# Patient Record
Sex: FEMALE | Race: WHITE | Employment: UNEMPLOYED | URBAN - NONMETROPOLITAN AREA
[De-identification: names, ages, dates, MRNs, and addresses within clinical notes are randomized per-mention and may not be internally consistent; named-entity substitution may affect disease eponyms.]

---

## 2017-01-19 ENCOUNTER — TELEPHONE (OUTPATIENT)
Dept: NEUROLOGY | Age: 52
End: 2017-01-19

## 2017-02-09 ENCOUNTER — OFFICE VISIT (OUTPATIENT)
Dept: NEUROLOGY | Age: 52
End: 2017-02-09
Payer: COMMERCIAL

## 2017-02-09 VITALS
BODY MASS INDEX: 25.74 KG/M2 | WEIGHT: 164 LBS | DIASTOLIC BLOOD PRESSURE: 82 MMHG | OXYGEN SATURATION: 100 % | HEIGHT: 67 IN | HEART RATE: 111 BPM | SYSTOLIC BLOOD PRESSURE: 124 MMHG

## 2017-02-09 DIAGNOSIS — G62.9 NEUROPATHY: ICD-10-CM

## 2017-02-09 DIAGNOSIS — R53.1 WEAKNESS: ICD-10-CM

## 2017-02-09 DIAGNOSIS — M79.662 PAIN IN BOTH LOWER LEGS: ICD-10-CM

## 2017-02-09 DIAGNOSIS — R20.2 PARESTHESIA: Primary | ICD-10-CM

## 2017-02-09 DIAGNOSIS — M79.661 PAIN IN BOTH LOWER LEGS: ICD-10-CM

## 2017-02-09 PROCEDURE — 99214 OFFICE O/P EST MOD 30 MIN: CPT | Performed by: PSYCHIATRY & NEUROLOGY

## 2017-02-09 RX ORDER — VITAMIN B COMPLEX
1 CAPSULE ORAL DAILY
COMMUNITY
End: 2021-10-21 | Stop reason: CLARIF

## 2017-02-09 RX ORDER — FERROUS SULFATE 325(65) MG
325 TABLET ORAL
COMMUNITY
End: 2017-08-09

## 2017-02-09 RX ORDER — METHOCARBAMOL 500 MG/1
500 TABLET, FILM COATED ORAL 3 TIMES DAILY
Qty: 90 TABLET | Refills: 5 | Status: SHIPPED | OUTPATIENT
Start: 2017-02-09 | End: 2021-03-31 | Stop reason: CLARIF

## 2017-02-09 RX ORDER — METHOCARBAMOL 500 MG/1
500 TABLET, FILM COATED ORAL
COMMUNITY
End: 2017-02-09 | Stop reason: SDUPTHER

## 2017-02-09 RX ORDER — CARISOPRODOL 350 MG/1
350 TABLET ORAL 4 TIMES DAILY PRN
COMMUNITY
End: 2017-08-09

## 2017-02-09 RX ORDER — PREGABALIN 75 MG/1
CAPSULE ORAL
Qty: 120 CAPSULE | Refills: 5 | Status: SHIPPED | OUTPATIENT
Start: 2017-02-09 | End: 2017-04-28 | Stop reason: SDUPTHER

## 2017-02-27 ENCOUNTER — TELEPHONE (OUTPATIENT)
Dept: NEUROLOGY | Age: 52
End: 2017-02-27

## 2017-03-08 ENCOUNTER — TELEPHONE (OUTPATIENT)
Dept: NEUROLOGY | Age: 52
End: 2017-03-08

## 2017-04-24 ENCOUNTER — TELEPHONE (OUTPATIENT)
Dept: NEUROLOGY | Age: 52
End: 2017-04-24

## 2017-05-01 RX ORDER — PREGABALIN 75 MG/1
CAPSULE ORAL
Qty: 360 CAPSULE | Refills: 3 | Status: SHIPPED | OUTPATIENT
Start: 2017-05-01 | End: 2017-08-10 | Stop reason: SDUPTHER

## 2017-05-31 ENCOUNTER — TELEPHONE (OUTPATIENT)
Dept: NEUROLOGY | Age: 52
End: 2017-05-31

## 2017-08-09 ENCOUNTER — OFFICE VISIT (OUTPATIENT)
Dept: NEUROLOGY | Age: 52
End: 2017-08-09
Payer: COMMERCIAL

## 2017-08-09 VITALS
HEART RATE: 93 BPM | SYSTOLIC BLOOD PRESSURE: 136 MMHG | HEIGHT: 66 IN | OXYGEN SATURATION: 98 % | BODY MASS INDEX: 26.84 KG/M2 | DIASTOLIC BLOOD PRESSURE: 85 MMHG | WEIGHT: 167 LBS

## 2017-08-09 DIAGNOSIS — M79.662 PAIN IN BOTH LOWER LEGS: ICD-10-CM

## 2017-08-09 DIAGNOSIS — R53.1 WEAKNESS: ICD-10-CM

## 2017-08-09 DIAGNOSIS — R20.2 PARESTHESIA: Primary | ICD-10-CM

## 2017-08-09 DIAGNOSIS — G62.9 NEUROPATHY: ICD-10-CM

## 2017-08-09 DIAGNOSIS — M79.661 PAIN IN BOTH LOWER LEGS: ICD-10-CM

## 2017-08-09 PROCEDURE — 99214 OFFICE O/P EST MOD 30 MIN: CPT | Performed by: PSYCHIATRY & NEUROLOGY

## 2017-08-09 RX ORDER — FAMOTIDINE 20 MG/1
TABLET, FILM COATED ORAL
Refills: 5 | COMMUNITY
Start: 2017-07-22 | End: 2018-09-05 | Stop reason: CLARIF

## 2017-08-14 RX ORDER — PREGABALIN 75 MG/1
CAPSULE ORAL
Qty: 360 CAPSULE | Refills: 3 | Status: SHIPPED | OUTPATIENT
Start: 2017-08-14 | End: 2018-11-06 | Stop reason: ALTCHOICE

## 2017-08-15 ENCOUNTER — TELEPHONE (OUTPATIENT)
Dept: NEUROLOGY | Age: 52
End: 2017-08-15

## 2017-09-05 ENCOUNTER — TELEPHONE (OUTPATIENT)
Dept: NEUROLOGY | Age: 52
End: 2017-09-05

## 2017-09-22 PROBLEM — K59.01 SLOW TRANSIT CONSTIPATION: Status: ACTIVE | Noted: 2017-09-22

## 2017-09-22 PROBLEM — M79.7 FIBROMYALGIA: Status: ACTIVE | Noted: 2017-09-22

## 2017-09-22 PROBLEM — E53.8 VITAMIN B12 DEFICIENCY: Status: ACTIVE | Noted: 2017-09-22

## 2017-09-22 PROBLEM — Z12.4 PAP SMEAR FOR CERVICAL CANCER SCREENING: Status: ACTIVE | Noted: 2017-09-22

## 2017-09-22 PROBLEM — E55.9 VITAMIN D DEFICIENCY: Status: ACTIVE | Noted: 2017-09-22

## 2017-09-25 DIAGNOSIS — E55.9 VITAMIN D DEFICIENCY: ICD-10-CM

## 2017-09-25 DIAGNOSIS — E53.8 VITAMIN B12 DEFICIENCY: ICD-10-CM

## 2017-09-25 DIAGNOSIS — Z00.00 ANNUAL PHYSICAL EXAM: Primary | ICD-10-CM

## 2017-10-03 RX ORDER — NORETHINDRONE ACETATE AND ETHINYL ESTRADIOL AND FERROUS FUMARATE 1MG-20(21)
KIT ORAL
Qty: 28 TABLET | Refills: 9 | Status: SHIPPED | OUTPATIENT
Start: 2017-10-03 | End: 2017-10-04

## 2017-10-04 ENCOUNTER — OFFICE VISIT (OUTPATIENT)
Dept: INTERNAL MEDICINE | Age: 52
End: 2017-10-04
Payer: COMMERCIAL

## 2017-10-04 VITALS
RESPIRATION RATE: 18 BRPM | HEART RATE: 87 BPM | BODY MASS INDEX: 25.9 KG/M2 | SYSTOLIC BLOOD PRESSURE: 112 MMHG | HEIGHT: 67 IN | DIASTOLIC BLOOD PRESSURE: 72 MMHG | WEIGHT: 165 LBS | OXYGEN SATURATION: 99 %

## 2017-10-04 DIAGNOSIS — Z12.12 SCREENING FOR COLORECTAL CANCER: ICD-10-CM

## 2017-10-04 DIAGNOSIS — M79.7 FIBROMYALGIA: ICD-10-CM

## 2017-10-04 DIAGNOSIS — Z00.00 ANNUAL PHYSICAL EXAM: Primary | ICD-10-CM

## 2017-10-04 DIAGNOSIS — E55.9 VITAMIN D DEFICIENCY: ICD-10-CM

## 2017-10-04 DIAGNOSIS — Z00.00 ANNUAL PHYSICAL EXAM: ICD-10-CM

## 2017-10-04 DIAGNOSIS — Z12.11 SCREENING FOR COLORECTAL CANCER: ICD-10-CM

## 2017-10-04 DIAGNOSIS — Z23 IMMUNIZATION DUE: ICD-10-CM

## 2017-10-04 DIAGNOSIS — G60.9 IDIOPATHIC PERIPHERAL NEUROPATHY: ICD-10-CM

## 2017-10-04 DIAGNOSIS — Z12.31 SCREENING MAMMOGRAM, ENCOUNTER FOR: ICD-10-CM

## 2017-10-04 DIAGNOSIS — E53.8 VITAMIN B12 DEFICIENCY: ICD-10-CM

## 2017-10-04 PROBLEM — K90.0 CELIAC DISEASE: Status: ACTIVE | Noted: 2017-10-04

## 2017-10-04 LAB
ALBUMIN SERPL-MCNC: 3.8 G/DL (ref 3.5–5.2)
ALP BLD-CCNC: 73 U/L (ref 35–104)
ALT SERPL-CCNC: 28 U/L (ref 5–33)
ANION GAP SERPL CALCULATED.3IONS-SCNC: 12 MMOL/L (ref 7–19)
AST SERPL-CCNC: 27 U/L (ref 5–32)
BILIRUB SERPL-MCNC: 0.6 MG/DL (ref 0.2–1.2)
BILIRUBIN URINE: NEGATIVE
BLOOD, URINE: NEGATIVE
BUN BLDV-MCNC: 9 MG/DL (ref 6–20)
CALCIUM SERPL-MCNC: 9 MG/DL (ref 8.6–10)
CHLORIDE BLD-SCNC: 102 MMOL/L (ref 98–111)
CHOLESTEROL, TOTAL: 166 MG/DL (ref 160–199)
CLARITY: CLEAR
CO2: 28 MMOL/L (ref 22–29)
COLOR: NORMAL
CREAT SERPL-MCNC: 0.8 MG/DL (ref 0.5–0.9)
GFR NON-AFRICAN AMERICAN: >60
GLUCOSE BLD-MCNC: 85 MG/DL (ref 74–109)
GLUCOSE URINE: NEGATIVE MG/DL
HCT VFR BLD CALC: 46 % (ref 37–47)
HDLC SERPL-MCNC: 52 MG/DL (ref 65–121)
HEMOGLOBIN: 14.5 G/DL (ref 12–16)
KETONES, URINE: NEGATIVE MG/DL
LDL CHOLESTEROL CALCULATED: 99 MG/DL
LEUKOCYTE ESTERASE, URINE: NEGATIVE
MCH RBC QN AUTO: 28.7 PG (ref 27–31)
MCHC RBC AUTO-ENTMCNC: 31.5 G/DL (ref 33–37)
MCV RBC AUTO: 91.1 FL (ref 81–99)
NITRITE, URINE: NEGATIVE
PDW BLD-RTO: 14 % (ref 11.5–14.5)
PH UA: 6.5
PLATELET # BLD: 222 K/UL (ref 130–400)
PMV BLD AUTO: 10.6 FL (ref 9.4–12.3)
POTASSIUM SERPL-SCNC: 3.9 MMOL/L (ref 3.5–5)
PROTEIN UA: NEGATIVE MG/DL
RBC # BLD: 5.05 M/UL (ref 4.2–5.4)
SODIUM BLD-SCNC: 142 MMOL/L (ref 136–145)
SPECIFIC GRAVITY UA: 1.02
TOTAL PROTEIN: 7.2 G/DL (ref 6.6–8.7)
TRIGL SERPL-MCNC: 73 MG/DL (ref 149–199)
TSH SERPL DL<=0.05 MIU/L-ACNC: 1.57 UIU/ML (ref 0.27–4.2)
UROBILINOGEN, URINE: 1 E.U./DL
VITAMIN B-12: 883 PG/ML (ref 211–946)
VITAMIN D 25-HYDROXY: 53.5 NG/ML
WBC # BLD: 5.7 K/UL (ref 4.8–10.8)

## 2017-10-04 PROCEDURE — 90715 TDAP VACCINE 7 YRS/> IM: CPT | Performed by: INTERNAL MEDICINE

## 2017-10-04 PROCEDURE — 90471 IMMUNIZATION ADMIN: CPT | Performed by: INTERNAL MEDICINE

## 2017-10-04 PROCEDURE — 99396 PREV VISIT EST AGE 40-64: CPT | Performed by: INTERNAL MEDICINE

## 2017-10-04 ASSESSMENT — ENCOUNTER SYMPTOMS
ABDOMINAL PAIN: 0
BLOOD IN STOOL: 0
TROUBLE SWALLOWING: 0
VOICE CHANGE: 0
VOMITING: 0
COUGH: 0
EYE REDNESS: 0
DIARRHEA: 0
NAUSEA: 0
CHEST TIGHTNESS: 0
CONSTIPATION: 0
COLOR CHANGE: 0
EYE PAIN: 0
SINUS PRESSURE: 0
SORE THROAT: 0
WHEEZING: 0

## 2017-10-04 NOTE — PROGRESS NOTES
Chief Complaint:   Lupe Toribio is a 54 y.o. female who presents for complete physical exam.    History of Present Illness:      Lupe Toribio is a 54 y.o. female who presents today for wellness visit AND follow up on her chronic medical conditions as noted below. Annual physical exam    Vitamin B12 deficiency- taking b12 supplement    Vitamin D deficiency- vit d takes with mvi    Idiopathic peripheral neuropathy- pt has followed with dr Cindy Smith she recently has read that BCP could cuase it?  So she has now stopped it    Fibromyalgia- continues to have pain sx      Patient Active Problem List    Diagnosis Date Noted    Annual physical exam 10/04/2017    Screening mammogram, encounter for 10/04/2017    Screening for colorectal cancer 10/04/2017     1/15 neg repeat 5 yrs ( had polyps)      Vitamin D deficiency 09/22/2017    Vitamin B12 deficiency 09/22/2017    Pap smear for cervical cancer screening 09/22/2017     10/16      Fibromyalgia 09/22/2017    Slow transit constipation 09/22/2017    Paresthesia 09/14/2016    Weakness 09/14/2016    Pain in both lower legs 09/14/2016    Idiopathic peripheral neuropathy 09/14/2016       Past Medical History:   Diagnosis Date    Arthralgia     Bradford's esophagus     Celiac disease     Depression     Fibromyalgia     Neuropathy (HCC)     Rash     Rhinitis     Trochanteric tendinitis        Past Surgical History:   Procedure Laterality Date    CHOLECYSTECTOMY      COLONOSCOPY      GALLBLADDER SURGERY      NASAL ENDOSCOPY      TONSILLECTOMY         Current Outpatient Prescriptions   Medication Sig Dispense Refill    pregabalin (LYRICA) 75 MG capsule 2 caps bid 360 capsule 3    famotidine (PEPCID) 20 MG tablet TAKE 1 TABLET BY MOUTH TWICE A DAY  5    b complex vitamins capsule Take 1 capsule by mouth daily      Multiple Vitamin (MULTI VITAMIN DAILY PO) Take by mouth      methocarbamol (ROBAXIN) 500 MG tablet Take 1 tablet by mouth 3 times daily 90 85     BUN 10/04/2017 9     CREATININE 10/04/2017 0.8     GFR Non- 10/04/2017 >60     Calcium 10/04/2017 9.0     Total Protein 10/04/2017 7.2     Alb 10/04/2017 3.8     Total Bilirubin 10/04/2017 0.6     Alkaline Phosphatase 10/04/2017 73     ALT 10/04/2017 28     AST 10/04/2017 27     Cholesterol, Total 10/04/2017 166     Triglycerides 10/04/2017 73*    HDL 10/04/2017 52*    LDL Calculated 10/04/2017 99     Color, UA 10/04/2017 DK YELLOW     Clarity, UA 10/04/2017 Clear     Glucose, Ur 10/04/2017 Negative     Bilirubin Urine 10/04/2017 Negative     Ketones, Urine 10/04/2017 Negative     Specific Gravity, UA 10/04/2017 1.021     Blood, Urine 10/04/2017 Negative     pH, UA 10/04/2017 6.5     Protein, UA 10/04/2017 Negative     Urobilinogen, Urine 10/04/2017 1.0     Nitrite, Urine 10/04/2017 Negative     Leukocyte Esterase, Urine 10/04/2017 Negative     Vit D, 25-Hydroxy 10/04/2017 53.5     Vitamin B-12 10/04/2017 883          Review of Systems   Constitutional: Negative for chills, fatigue and fever. HENT: Negative for congestion, ear pain, postnasal drip, sinus pressure, sore throat, trouble swallowing and voice change. Eyes: Negative for pain, redness and visual disturbance. Respiratory: Negative for cough, chest tightness and wheezing. Cardiovascular: Negative for chest pain, palpitations and leg swelling. Gastrointestinal: Negative for abdominal pain, blood in stool, constipation, diarrhea, nausea and vomiting. Endocrine: Negative for polydipsia and polyuria. Genitourinary: Negative for dysuria, enuresis, flank pain, frequency and urgency. Musculoskeletal: Negative for arthralgias, gait problem and joint swelling. Skin: Negative for color change and rash. Neurological: Negative for dizziness, tremors, syncope, facial asymmetry, speech difficulty, weakness, numbness and headaches.    Psychiatric/Behavioral: Negative for agitation, behavioral mammogram, encounter for- schedule  * pap 2016 repeat 3 yrs  * cscope 2015 repeat 5 yrs    Vitamin B12 deficiency- her level is 883    Vitamin D deficiency- vit d level is good- take 800 with her MVI    Idiopathic peripheral neuropathy- Complex symptoms that involve paresthesias, episodes or weakness, pain in lower legs-patient follows with Dr. Wei Yanez- she will get recommendations          Orders Placed This Encounter   Procedures    LUIS A DIGITAL SCREEN W CAD BILATERAL    CBC Auto Differential    Comprehensive Metabolic Panel    Lipid Panel    Vitamin D 25 Hydroxy    Vitamin B12    TSH without Reflex    Urinalysis     New Prescriptions    No medications on file      There are no Patient Instructions on file for this visit. Return in about 1 year (around 10/4/2018) for Annual Physical.   EMR Dragon/transcription disclaimer:Significant part of this  encounter note is electronic transcription/translation of spoken language to printed text. The electronic translation of spoken language may be erroneous, or at times, nonsensical words or phrases may be inadvertently transcribed.  Although I have reviewed the note for such errors, some may still exist.

## 2017-10-04 NOTE — MR AVS SNAPSHOT
After Visit Summary             Jeffrey Aguirre   10/4/2017 1:30 PM   Office Visit    Description:  Female : 1965   Provider:  Nancy Bolivar MD   Department:  [address] [place] [address] Internal Medicine              Your Follow-Up and Future Appointments         Below is a list of your follow-up and future appointments. This may not be a complete list as you may have made appointments directly with providers that we are not aware of or your providers may have made some for you. Please call your providers to confirm appointments. It is important to keep your appointments. Please bring your current insurance card, photo ID, co-pay, and all medication bottles to your appointment. If self-pay, payment is expected at the time of service. Your To-Do List     Future Appointments Provider Department Dept Phone    2017 3:30 PM [facility] [address] in [address] on the fourth floor of the [address] [place] [address] at the scheduled time      *Patient should not wear deodorant or powder. 2018 11:00 AM Francisca Stubbs MD [facility] [phone]    Please arrive 15 minutes prior to appointment, bring photo ID and insurance card. 10/17/2018 1:30 PM Nancy Bolivar MD [address] [place] [address] Internal Medicine [phone]    If this is a sports or school physical please bring the physical form with you.     Future Orders Complete By Expires    LUIS A DIGITAL SCREEN W CAD BILATERAL [ Custom]  10/4/2017 2018    CBC Auto Differential [OYE2008 Custom]  10/4/2018 2018    Comprehensive Metabolic Panel [LDW26 Custom]  10/4/2018 2018    Lipid Panel [LAB18 Custom]  10/4/2018 2018    TSH without Reflex [OCI040 Custom]  10/4/2018 2018    Urinalysis [FUJ449 Custom]  10/4/2018 2018    Vitamin B12 [LAB67 Custom]  10/4/2018 2018    Vitamin D 25 Hydroxy [TWA039 Custom]  10/4/2018 2018    Follow-Up methocarbamol (ROBAXIN) 500 MG tablet Take 1 tablet by mouth 3 times daily    gabapentin (NEURONTIN) 300 MG capsule TAKE ONE CAPSULE BY MOUTH TWICE A DAY    RESTASIS 0.05 % ophthalmic emulsion INSTILL 1 DROP INTO BOTH EYES TWICE DAILY FOR 30 DAYS    [employer] ALLERGY RELIEF D  MG per extended release tablet TAKE 1 TABLET BY MOUTH EVERY 12 HOURS AS NEEDED    sucralfate (CARAFATE) 1 GM tablet DISSOLVE 1 TABLET INTO SLURRY THREE TIMES DAILY BEFORE MEALS    triamcinolone (NASACORT ALLERGY 24HR) 55 MCG/ACT nasal inhaler 2 sprays by Nasal route daily      Allergies              Augmentin [Amoxicillin-pot Clavulanate]     Bactrim [Sulfamethoxazole-trimethoprim]     Factive [Gemifloxacin]     Gluten Meal     Glutethimides     Proton Pump Inhibitors       We Ordered/Performed the following           Tdap (age 10y-63y) IM (ADACEL)          Additional Information        Basic Information     Date Of Birth Sex Race Ethnicity Preferred Language    1965 Female White Non-/Non  English      Problem List as of 10/4/2017                 Annual physical exam    Screening mammogram, encounter for    Screening for colorectal cancer    Celiac disease    Vitamin D deficiency    Vitamin B12 deficiency    Pap smear for cervical cancer screening    Fibromyalgia    Slow transit constipation    Paresthesia    Weakness    Pain in both lower legs    Idiopathic peripheral neuropathy      Immunizations as of 10/4/2017     Name Date    Tdap (Boostrix, Adacel) 10/4/2017      Preventive Care        Date Due    Yearly Flu Vaccine (1) 10/13/2027 (Originally 9/1/2017)    Pap Smear 10/10/2019    Cholesterol Screening 10/4/2022    Tetanus Combination Vaccine (2 - Td) 10/4/2027            MyChart Signup           Our records indicate that you have an active [facility] account. You can view your After Visit Summary by going to [email or web address] and logging in with your [facility] username and password. If you don't have a [facility] username and password but a parent or guardian has access to your record, the parent or guardian should login with their own Sprinkle username and password and access your record to view the After Visit Summary. Additional Information  If you have questions, please contact the physician practice where you receive care. Remember, Sprinkle is NOT to be used for urgent needs. For medical emergencies, dial 911. For questions regarding your [facility]inkle account call [phone]. If you have a clinical question, please call your doctor's office.

## 2017-12-01 ENCOUNTER — TELEPHONE (OUTPATIENT)
Dept: NEUROLOGY | Age: 52
End: 2017-12-01

## 2017-12-01 NOTE — TELEPHONE ENCOUNTER
Spoke to patients home health nurse about patients INR level. She stated that primary care was supposed to take care of this but the office stated she needs to have a new patient appointment and not just a hospital follow up. I spoke with Dr. Bryan Montes concerning this and he wants her to stay at the same 1.5mg and recheck the INR on Monday. I told her to give us a call if she has any questions.

## 2017-12-13 ENCOUNTER — HOSPITAL ENCOUNTER (OUTPATIENT)
Dept: WOMENS IMAGING | Age: 52
Discharge: HOME | End: 2017-12-13
Payer: COMMERCIAL

## 2017-12-13 DIAGNOSIS — Z12.31 SCREENING MAMMOGRAM, ENCOUNTER FOR: ICD-10-CM

## 2017-12-13 PROCEDURE — 77063 BREAST TOMOSYNTHESIS BI: CPT

## 2017-12-13 PROCEDURE — G0279 TOMOSYNTHESIS, MAMMO: HCPCS

## 2018-01-02 ENCOUNTER — TELEPHONE (OUTPATIENT)
Dept: NEUROSURGERY | Age: 53
End: 2018-01-02

## 2018-01-03 DIAGNOSIS — M79.661 PAIN IN BOTH LOWER LEGS: ICD-10-CM

## 2018-01-03 DIAGNOSIS — R53.1 WEAKNESS: ICD-10-CM

## 2018-01-03 DIAGNOSIS — G60.9 IDIOPATHIC PERIPHERAL NEUROPATHY: ICD-10-CM

## 2018-01-03 DIAGNOSIS — Z78.0 MENOPAUSE: Primary | ICD-10-CM

## 2018-01-03 DIAGNOSIS — M79.662 PAIN IN BOTH LOWER LEGS: ICD-10-CM

## 2018-01-11 DIAGNOSIS — Z78.0 POST-MENOPAUSAL: Primary | ICD-10-CM

## 2018-01-11 RX ORDER — GABAPENTIN 300 MG/1
CAPSULE ORAL
Qty: 360 CAPSULE | Refills: 3 | Status: SHIPPED | OUTPATIENT
Start: 2018-01-11 | End: 2018-04-02 | Stop reason: SDUPTHER

## 2018-01-18 ENCOUNTER — TELEPHONE (OUTPATIENT)
Dept: NEUROLOGY | Age: 53
End: 2018-01-18

## 2018-01-18 NOTE — TELEPHONE ENCOUNTER
Prescription Question     From  Lilia Seals To Sent  1/18/2018  3:58 PM   I would like to try a CBD oil with no THC but I read that it can inhibit hepatic drug metabolism. Is it safe to use with gabapentin and lyrica? Thank you.

## 2018-01-19 ENCOUNTER — TELEPHONE (OUTPATIENT)
Dept: INTERNAL MEDICINE | Age: 53
End: 2018-01-19

## 2018-01-22 ENCOUNTER — TELEPHONE (OUTPATIENT)
Dept: INTERNAL MEDICINE | Age: 53
End: 2018-01-22

## 2018-01-23 DIAGNOSIS — Z78.0 MENOPAUSE: ICD-10-CM

## 2018-01-23 DIAGNOSIS — Z78.0 POST-MENOPAUSAL: ICD-10-CM

## 2018-01-23 LAB
FOLLICLE STIMULATING HORMONE: 103.5 MIU/ML
PROGESTERONE LEVEL: <0.05 NG/ML

## 2018-01-27 LAB
ESTRADIOL LEVEL: 5.8 PG/ML
ESTROGEN TOTAL: 18 PG/ML
ESTRONE: 12.2 PG/ML

## 2018-02-16 ENCOUNTER — OFFICE VISIT (OUTPATIENT)
Dept: NEUROLOGY | Age: 53
End: 2018-02-16
Payer: COMMERCIAL

## 2018-02-16 VITALS
HEART RATE: 98 BPM | WEIGHT: 165 LBS | SYSTOLIC BLOOD PRESSURE: 136 MMHG | BODY MASS INDEX: 26.23 KG/M2 | DIASTOLIC BLOOD PRESSURE: 94 MMHG

## 2018-02-16 DIAGNOSIS — R53.1 WEAKNESS: ICD-10-CM

## 2018-02-16 DIAGNOSIS — M79.662 PAIN IN BOTH LOWER LEGS: ICD-10-CM

## 2018-02-16 DIAGNOSIS — M79.661 PAIN IN BOTH LOWER LEGS: ICD-10-CM

## 2018-02-16 DIAGNOSIS — G62.9 NEUROPATHY: ICD-10-CM

## 2018-02-16 DIAGNOSIS — R20.2 PARESTHESIA: Primary | ICD-10-CM

## 2018-02-16 PROCEDURE — 99214 OFFICE O/P EST MOD 30 MIN: CPT | Performed by: PSYCHIATRY & NEUROLOGY

## 2018-02-16 RX ORDER — GLUCOSAMINE HCL 500 MG
2000 TABLET ORAL
COMMUNITY
End: 2019-07-30

## 2018-02-16 RX ORDER — PREGABALIN 75 MG/1
75 CAPSULE ORAL 2 TIMES DAILY
COMMUNITY
End: 2018-09-05 | Stop reason: CLARIF

## 2018-02-16 RX ORDER — LACTULOSE 10 G/15ML
20 SOLUTION ORAL DAILY
COMMUNITY
End: 2018-11-06

## 2018-02-16 RX ORDER — PERPHENAZINE 2 MG
TABLET ORAL
COMMUNITY

## 2018-02-16 NOTE — PROGRESS NOTES
Not on file. Social History Main Topics    Smoking status: Never Smoker    Smokeless tobacco: Never Used    Alcohol use No    Drug use: Unknown    Sexual activity: Not on file     Other Topics Concern    Not on file     Social History Narrative    No narrative on file       Review of Systems     Constitutional  yes fever or chills. yes diaphoresis or significant fatigue. HENT  No tinnitus or significant hearing loss. Eyes  yes sudden vision change or eye pain  Respiratory  yes significant shortness of breath or cough  Cardiovascular  no chest pain No palpitations or significant leg swelling  Gastrointestinal  no abdominal swelling or pain. Genitourinary  No difficulty urinating, dysuria  Musculoskeletal  yes back pain or myalgia. Skin  no color change or rash  Neurologic  No seizures. No lateralizing weakness. Hematologic  no easy bruising or excessive bleeding. Psychiatric  no severe anxiety or nervousness. All other review of systems are negative. Current Outpatient Prescriptions   Medication Sig Dispense Refill    lactulose (CHRONULAC) 10 GM/15ML solution Take 20 g by mouth 3 times daily      Probiotic Product (PROBIOTIC-10 PO) Take by mouth      Omega 3-6-9 CAPS Take by mouth      Cholecalciferol (VITAMIN D3) 3000 units TABS Take 2,000 Units by mouth      pregabalin (LYRICA) 75 MG capsule Take 75 mg by mouth 2 times daily.  gabapentin (NEURONTIN) 300 MG capsule Take 2 caps by mouth twice daily.  360 capsule 3    pregabalin (LYRICA) 75 MG capsule 2 caps bid 360 capsule 3    b complex vitamins capsule Take 1 capsule by mouth daily      Multiple Vitamin (MULTI VITAMIN DAILY PO) Take by mouth      methocarbamol (ROBAXIN) 500 MG tablet Take 1 tablet by mouth 3 times daily 90 tablet 5    RESTASIS 0.05 % ophthalmic emulsion INSTILL 1 DROP INTO BOTH EYES TWICE DAILY FOR 30 DAYS  5    [employer] ALLERGY RELIEF D  MG per extended release tablet TAKE 1 TABLET BY MOUTH CREATININE 0.8 10/04/2017    GLUCOSE 85 10/04/2017    CALCIUM 9.0 10/04/2017    PROT 7.2 10/04/2017    LABALBU 3.8 10/04/2017    BILITOT 0.6 10/04/2017    ALKPHOS 73 10/04/2017    AST 27 10/04/2017    ALT 28 10/04/2017    LABGLOM >60 10/04/2017           Assessment    ICD-10-CM ICD-9-CM    1. Paresthesia R20.2 782.0    2. Weakness R53.1 780.79    3. Pain in both lower legs M79.661 729.5     M79.662     4. Neuropathy ([facility]) G62.9 355.9        Her neurological examination today was significant for some decreased sensation in the face along with arms and legs subjectively. Her motor exam revealed give way weakness in the right arm and right leg. Her reflexes were relatively symmetric. Her gait was unremarkable. Based upon her history and examination, it is unclear what the etiology of her symptoms are. She has symptoms of generalized numbness, worse in the face along with some focal right-sided weakness. Her MRI of the brain had no abnormal signal within it to suggest either an ischemic etiology or a demyelinating one. Her MRI of the cervical spine revealed some degenerative changes but not abnormal signal within the cord. Her blood work including B12, methylmalonic acid, TSH, T4, a RUSTAM, double-stranded DNA, sed rate, RPR, and CPK was unremarkable except for an elevated RUSTAM titer of :320. Her EMG with Nerve conduction study of the left arm and legs was essentially unremarkable. For her symptoms of itching her gralise will be weaned off due cost for her small fiber neuropathy. She was tried on Lyrica but not approved. The patient indicated understanding of the management plan. Her LP with MS panel for completeness was unremarkable. Did add requip but made her feel worse. She  Is  on Lyrica 75 mg bid and neurontin 600 mg bid. Can not afford Gralise. We could consider valium or marinol. CBD oil made her worse. She's to follow-up with me in approximately 6 months and call with any further problems.       Plan    No orders

## 2018-02-23 ENCOUNTER — TELEPHONE (OUTPATIENT)
Dept: NEUROLOGY | Age: 53
End: 2018-02-23

## 2018-03-29 ENCOUNTER — TELEPHONE (OUTPATIENT)
Dept: NEUROLOGY | Age: 53
End: 2018-03-29

## 2018-04-02 ENCOUNTER — OFFICE VISIT (OUTPATIENT)
Dept: NEUROLOGY | Age: 53
End: 2018-04-02
Payer: COMMERCIAL

## 2018-04-02 VITALS
HEART RATE: 90 BPM | BODY MASS INDEX: 26.52 KG/M2 | HEIGHT: 66 IN | OXYGEN SATURATION: 98 % | SYSTOLIC BLOOD PRESSURE: 134 MMHG | DIASTOLIC BLOOD PRESSURE: 86 MMHG | WEIGHT: 165 LBS

## 2018-04-02 DIAGNOSIS — M79.661 PAIN IN BOTH LOWER LEGS: ICD-10-CM

## 2018-04-02 DIAGNOSIS — M79.662 PAIN IN BOTH LOWER LEGS: ICD-10-CM

## 2018-04-02 DIAGNOSIS — G62.9 NEUROPATHY: ICD-10-CM

## 2018-04-02 DIAGNOSIS — R53.1 WEAKNESS: ICD-10-CM

## 2018-04-02 DIAGNOSIS — R20.2 PARESTHESIA: Primary | ICD-10-CM

## 2018-04-02 DIAGNOSIS — R20.2 PARESTHESIA: ICD-10-CM

## 2018-04-02 PROCEDURE — 99214 OFFICE O/P EST MOD 30 MIN: CPT | Performed by: PSYCHIATRY & NEUROLOGY

## 2018-04-02 RX ORDER — GABAPENTIN 300 MG/1
CAPSULE ORAL
Qty: 540 CAPSULE | Refills: 4 | Status: SHIPPED | OUTPATIENT
Start: 2018-04-02 | End: 2021-10-21

## 2018-04-02 RX ORDER — DEXAMETHASONE 0.5 MG/5ML
SOLUTION ORAL
Refills: 0 | COMMUNITY
Start: 2018-03-12 | End: 2018-11-06 | Stop reason: ALTCHOICE

## 2018-04-05 LAB
ENA TO RNP ANTIBODY: 0
ENA TO SMITH (SM) ANTIBODY: 0
ENA TO SSB (LA) ANTIBODY: 0
SSA 52 (RO) (ENA) AB, IGG: 19

## 2018-04-10 ENCOUNTER — TELEPHONE (OUTPATIENT)
Dept: NEUROSURGERY | Age: 53
End: 2018-04-10

## 2018-04-12 PROBLEM — Z12.4 PAP SMEAR FOR CERVICAL CANCER SCREENING: Status: RESOLVED | Noted: 2017-09-22 | Resolved: 2018-04-12

## 2018-04-12 PROBLEM — Z12.31 SCREENING MAMMOGRAM, ENCOUNTER FOR: Status: RESOLVED | Noted: 2017-10-04 | Resolved: 2018-04-12

## 2018-04-12 PROBLEM — Z12.11 SCREENING FOR COLORECTAL CANCER: Status: RESOLVED | Noted: 2017-10-04 | Resolved: 2018-04-12

## 2018-04-12 PROBLEM — Z12.12 SCREENING FOR COLORECTAL CANCER: Status: RESOLVED | Noted: 2017-10-04 | Resolved: 2018-04-12

## 2018-04-12 PROBLEM — Z00.00 ANNUAL PHYSICAL EXAM: Status: RESOLVED | Noted: 2017-10-04 | Resolved: 2018-04-12

## 2018-04-24 ENCOUNTER — TELEPHONE (OUTPATIENT)
Dept: NEUROLOGY | Age: 53
End: 2018-04-24

## 2018-09-05 ENCOUNTER — OFFICE VISIT (OUTPATIENT)
Dept: INTERNAL MEDICINE | Age: 53
End: 2018-09-05
Payer: COMMERCIAL

## 2018-09-05 VITALS
WEIGHT: 159 LBS | TEMPERATURE: 99 F | OXYGEN SATURATION: 98 % | RESPIRATION RATE: 18 BRPM | HEIGHT: 67 IN | HEART RATE: 84 BPM | SYSTOLIC BLOOD PRESSURE: 100 MMHG | DIASTOLIC BLOOD PRESSURE: 80 MMHG | BODY MASS INDEX: 24.96 KG/M2

## 2018-09-05 DIAGNOSIS — M79.18 LEFT BUTTOCK PAIN: ICD-10-CM

## 2018-09-05 DIAGNOSIS — G60.9 IDIOPATHIC PERIPHERAL NEUROPATHY: ICD-10-CM

## 2018-09-05 DIAGNOSIS — R79.0 LOW FERRITIN LEVEL: ICD-10-CM

## 2018-09-05 DIAGNOSIS — M79.2 NEURALGIA: ICD-10-CM

## 2018-09-05 DIAGNOSIS — B02.9 HERPES ZOSTER WITHOUT COMPLICATION: Primary | ICD-10-CM

## 2018-09-05 PROCEDURE — 99214 OFFICE O/P EST MOD 30 MIN: CPT | Performed by: INTERNAL MEDICINE

## 2018-09-05 RX ORDER — FERROUS SULFATE 325(65) MG
325 TABLET ORAL
COMMUNITY
End: 2019-07-30

## 2018-09-05 RX ORDER — PERPHENAZINE 16 MG
600 TABLET ORAL 3 TIMES DAILY
COMMUNITY
End: 2019-07-30

## 2018-09-05 RX ORDER — VALACYCLOVIR HYDROCHLORIDE 1 G/1
TABLET, FILM COATED ORAL
Qty: 21 TABLET | Refills: 0 | Status: SHIPPED | OUTPATIENT
Start: 2018-09-05 | End: 2018-09-13 | Stop reason: CLARIF

## 2018-09-05 RX ORDER — HYDROCODONE BITARTRATE AND ACETAMINOPHEN 7.5; 325 MG/1; MG/1
1 TABLET ORAL EVERY 6 HOURS PRN
Qty: 12 TABLET | Refills: 0 | Status: SHIPPED | OUTPATIENT
Start: 2018-09-05 | End: 2018-09-08

## 2018-09-05 ASSESSMENT — PATIENT HEALTH QUESTIONNAIRE - PHQ9
SUM OF ALL RESPONSES TO PHQ QUESTIONS 1-9: 0
2. FEELING DOWN, DEPRESSED OR HOPELESS: 0
SUM OF ALL RESPONSES TO PHQ9 QUESTIONS 1 & 2: 0
1. LITTLE INTEREST OR PLEASURE IN DOING THINGS: 0
SUM OF ALL RESPONSES TO PHQ QUESTIONS 1-9: 0

## 2018-09-05 ASSESSMENT — ENCOUNTER SYMPTOMS
SORE THROAT: 0
CONSTIPATION: 0
ABDOMINAL PAIN: 0
CHEST TIGHTNESS: 0
COUGH: 0
WHEEZING: 0

## 2018-09-05 NOTE — PROGRESS NOTES
0    gabapentin (NEURONTIN) 300 MG capsule Take 2 caps by mouth three daily. 540 capsule 4    lactulose (CHRONULAC) 10 GM/15ML solution Take 20 g by mouth 3 times daily      Probiotic Product (PROBIOTIC-10 PO) Take by mouth      Omega 3-6-9 CAPS Take by mouth      Cholecalciferol (VITAMIN D3) 3000 units TABS Take 2,000 Units by mouth      pregabalin (LYRICA) 75 MG capsule 2 caps bid (Patient taking differently: .) 360 capsule 3    b complex vitamins capsule Take 1 capsule by mouth daily      Multiple Vitamin (MULTI VITAMIN DAILY PO) Take by mouth      methocarbamol (ROBAXIN) 500 MG tablet Take 1 tablet by mouth 3 times daily 90 tablet 5    RESTASIS 0.05 % ophthalmic emulsion INSTILL 1 DROP INTO BOTH EYES TWICE DAILY FOR 30 DAYS  5    [employer] ALLERGY RELIEF D  MG per extended release tablet TAKE 1 TABLET BY MOUTH EVERY 12 HOURS AS NEEDED  1    sucralfate (CARAFATE) 1 GM tablet DISSOLVE 1 TABLET INTO SLURRY THREE TIMES DAILY BEFORE MEALS  4    triamcinolone (NASACORT ALLERGY 24HR) 55 MCG/ACT nasal inhaler 2 sprays by Nasal route daily       No current facility-administered medications for this visit. Allergies   Allergen Reactions    Doxepin Itching    Augmentin [Amoxicillin-Pot Clavulanate]     Bactrim [Sulfamethoxazole-Trimethoprim]     Factive [Gemifloxacin]     Gluten Meal     Glutethimides     Proton Pump Inhibitors      Social History   Substance Use Topics    Smoking status: Never Smoker    Smokeless tobacco: Never Used    Alcohol use No      Family History   Problem Relation Age of Onset    Crohn's Disease Son        Review of Systems   Constitutional: Positive for fatigue. Negative for chills and fever. HENT: Negative for congestion, ear pain, nosebleeds, postnasal drip and sore throat. Respiratory: Negative for cough, chest tightness and wheezing. Cardiovascular: Negative for chest pain, palpitations and leg swelling.    Gastrointestinal: Negative for abdominal pain and constipation. Genitourinary: Negative for dysuria and urgency. Musculoskeletal: Negative. Negative for arthralgias. Left buttock pain   Skin: Positive for rash. Neurological: Positive for numbness. Negative for dizziness and headaches. Psychiatric/Behavioral: Negative. Vitals:    09/05/18 1222   BP: 100/80   Site: Left Arm   Position: Sitting   Cuff Size: Large Adult   Pulse: 84   Resp: 18   SpO2: 98%   Weight: 159 lb (72.1 kg)   Height: 5' 6.5\" (1.689 m)     Body mass index is 25.28 kg/m². Physical Exam   Constitutional: She is oriented to person, place, and time. She appears well-developed and well-nourished. HENT:   Right Ear: External ear normal.   Left Ear: External ear normal.   Mouth/Throat: Oropharynx is clear and moist. No oropharyngeal exudate. Eyes: Pupils are equal, round, and reactive to light. Conjunctivae are normal.   Neck: Neck supple. No JVD present. No thyromegaly present. Cardiovascular: Normal rate and normal heart sounds. No murmur heard. Pulmonary/Chest: Breath sounds normal. No respiratory distress. She has no wheezes. She has no rales. She exhibits no tenderness. Abdominal: Soft. Bowel sounds are normal.   Musculoskeletal: Normal range of motion. Lymphadenopathy:     She has no cervical adenopathy. Neurological: She is oriented to person, place, and time. Skin: Skin is warm. No rash noted. Classic rash CW H Zoster  grouped vesicualr rash  extends from innner left buttock to left inner thigh and groin area  Labial skin is spared       Lab Review   No visits with results within 2 Month(s) from this visit.    Latest known visit with results is:   Office Visit on 04/02/2018   Component Date Value    HAMILTON Roque (SM) Ab 04/02/2018 0     Anti-RNP 04/02/2018 0     SSA 52 (RO) (HAMILTON) AB, IGG 04/02/2018 19     HAMILTON SSB (LA) Ab 04/02/2018 0            ASSESSMENT/PLAN:  Herpes zoster without complication  Left buttock pain  Classic rash CW H Zoster  grouped vesicualr rash  extends from innner left buttock to left inner thigh and groin area  Labial skin is spared  RX valtrex  RX - short-term prescription for hydrocodone to help with severe pain  Colleen Yanes was reviewed  On exam today and as per discussions with the patient today there is no evidence of adverse events such as cognitive impairment, sedation, constipation or falls related to prescribed medications. There is also no evidence of aberrant behavior like lost prescriptions or early refill requests or multiple prescribers for controlled substances. Patient  was advised NOT to attempt to drive a motor vehichle or operate any heavy machinery within 6 hrs of taking the presribed medication -hydrocodone       Neuralgia associated with herpes zoster  Idiopathic peripheral neuropathy    At this time since patient is taking both Lyrica and gabapentin I will not be able to describe or increase these medications that are prescribed by her neurologist.    Low ferrtin  Repeat with next month labs    Other orders  -     valACYclovir (VALTREX) 1 g tablet; Take one tablet TID              No orders of the defined types were placed in this encounter. New Prescriptions    VALACYCLOVIR (VALTREX) 1 G TABLET    Take one tablet TID        No Follow-up on file. There are no Patient Instructions on file for this visit. EMR Dragon/transcription disclaimer:Significant part of this  encounter note is electronic transcription/translation of spoken language to printed text. The electronic translation of spoken language may be erroneous, or at times, nonsensical words or phrases may be inadvertently transcribed.  Although I have reviewed the note for such errors, some may still exist.

## 2018-09-13 ENCOUNTER — OFFICE VISIT (OUTPATIENT)
Dept: INTERNAL MEDICINE | Age: 53
End: 2018-09-13
Payer: COMMERCIAL

## 2018-09-13 VITALS
HEIGHT: 66 IN | OXYGEN SATURATION: 98 % | WEIGHT: 160 LBS | TEMPERATURE: 98.6 F | DIASTOLIC BLOOD PRESSURE: 74 MMHG | BODY MASS INDEX: 25.71 KG/M2 | SYSTOLIC BLOOD PRESSURE: 118 MMHG | HEART RATE: 92 BPM

## 2018-09-13 DIAGNOSIS — M79.18 LEFT BUTTOCK PAIN: ICD-10-CM

## 2018-09-13 DIAGNOSIS — R79.0 LOW FERRITIN LEVEL: ICD-10-CM

## 2018-09-13 DIAGNOSIS — M79.2 NEURALGIA: ICD-10-CM

## 2018-09-13 DIAGNOSIS — B02.9 HERPES ZOSTER WITHOUT COMPLICATION: Primary | ICD-10-CM

## 2018-09-13 PROCEDURE — 99213 OFFICE O/P EST LOW 20 MIN: CPT | Performed by: INTERNAL MEDICINE

## 2018-09-13 ASSESSMENT — ENCOUNTER SYMPTOMS
SPUTUM PRODUCTION: 0
NAUSEA: 0
EYE DISCHARGE: 0
SHORTNESS OF BREATH: 0
HEMOPTYSIS: 0
EYE PAIN: 0
DIARRHEA: 0
BACK PAIN: 0
ABDOMINAL PAIN: 0
EYES NEGATIVE: 1
VOMITING: 0
COUGH: 0
WHEEZING: 0

## 2018-09-13 NOTE — PROGRESS NOTES
Hemoglobin (g/dL)   Date Value   10/04/2017 14.5     Hematocrit (%)   Date Value   10/04/2017 46.0     Platelets (K/uL)   Date Value   10/04/2017 222         Assessment and Plan  Herpes zoster without complication  Left buttock pain  Classic rash CW H Zoster- improving/ resolving     crusted rash / no open lesions- extends from innner left buttock to left inner thigh and groin area  Labial skin is spared  RX valtrex- completed yst        Neuralgia associated with herpes zoster  Idiopathic peripheral neuropathy- better/ most pain has resolved     At this time since patient is taking both Lyrica and gabapentin I will not be able to describe or increase these medications that are prescribed by her neurologist.     Low ferrtin  Repeat with next month labs  New Prescriptions    No medications on file      There are no Patient Instructions on file for this visit. No Follow-up on file. EMR Dragon/transcription disclaimer:Significant part of this  encounter note is electronic transcription/translation of spoken language to printed text. The electronic translation of spoken language may be erroneous, or at times, nonsensical words or phrases may be inadvertently transcribed.  Although I have reviewed the note for such errors, some may still exist.

## 2018-10-04 DIAGNOSIS — R79.0 LOW FERRITIN LEVEL: ICD-10-CM

## 2018-10-04 DIAGNOSIS — E53.8 VITAMIN B12 DEFICIENCY: ICD-10-CM

## 2018-10-04 DIAGNOSIS — Z00.00 ANNUAL PHYSICAL EXAM: ICD-10-CM

## 2018-10-04 DIAGNOSIS — E55.9 VITAMIN D DEFICIENCY: ICD-10-CM

## 2018-10-04 LAB
ALBUMIN SERPL-MCNC: 4.1 G/DL (ref 3.5–5.2)
ALP BLD-CCNC: 90 U/L (ref 35–104)
ALT SERPL-CCNC: 15 U/L (ref 5–33)
ANION GAP SERPL CALCULATED.3IONS-SCNC: 14 MMOL/L (ref 7–19)
AST SERPL-CCNC: 20 U/L (ref 5–32)
BACTERIA: ABNORMAL /HPF
BASOPHILS ABSOLUTE: 0 K/UL (ref 0–0.2)
BASOPHILS RELATIVE PERCENT: 0.1 % (ref 0–1)
BILIRUB SERPL-MCNC: 0.6 MG/DL (ref 0.2–1.2)
BILIRUBIN URINE: ABNORMAL
BLOOD, URINE: NEGATIVE
BUN BLDV-MCNC: 7 MG/DL (ref 6–20)
CALCIUM SERPL-MCNC: 9.8 MG/DL (ref 8.6–10)
CHLORIDE BLD-SCNC: 99 MMOL/L (ref 98–111)
CHOLESTEROL, TOTAL: 183 MG/DL (ref 160–199)
CLARITY: ABNORMAL
CO2: 27 MMOL/L (ref 22–29)
COLOR: ABNORMAL
CREAT SERPL-MCNC: 0.7 MG/DL (ref 0.5–0.9)
EOSINOPHILS ABSOLUTE: 0.2 K/UL (ref 0–0.6)
EOSINOPHILS RELATIVE PERCENT: 2.4 % (ref 0–5)
EPITHELIAL CELLS, UA: ABNORMAL /HPF
FERRITIN: 51.3 NG/ML (ref 13–150)
GFR NON-AFRICAN AMERICAN: >60
GLUCOSE BLD-MCNC: 80 MG/DL (ref 74–109)
GLUCOSE URINE: NEGATIVE MG/DL
HCT VFR BLD CALC: 45.1 % (ref 37–47)
HDLC SERPL-MCNC: 50 MG/DL (ref 65–121)
HEMOGLOBIN: 14.2 G/DL (ref 12–16)
KETONES, URINE: NEGATIVE MG/DL
LDL CHOLESTEROL CALCULATED: 118 MG/DL
LEUKOCYTE ESTERASE, URINE: ABNORMAL
LYMPHOCYTES ABSOLUTE: 1.1 K/UL (ref 1.1–4.5)
LYMPHOCYTES RELATIVE PERCENT: 14.8 % (ref 20–40)
MCH RBC QN AUTO: 28.3 PG (ref 27–31)
MCHC RBC AUTO-ENTMCNC: 31.5 G/DL (ref 33–37)
MCV RBC AUTO: 89.8 FL (ref 81–99)
MONOCYTES ABSOLUTE: 0.4 K/UL (ref 0–0.9)
MONOCYTES RELATIVE PERCENT: 5.6 % (ref 0–10)
MUCUS: ABNORMAL /LPF
NEUTROPHILS ABSOLUTE: 5.5 K/UL (ref 1.5–7.5)
NEUTROPHILS RELATIVE PERCENT: 76.7 % (ref 50–65)
NITRITE, URINE: NEGATIVE
PDW BLD-RTO: 14.6 % (ref 11.5–14.5)
PH UA: 6
PLATELET # BLD: 234 K/UL (ref 130–400)
PMV BLD AUTO: 10.2 FL (ref 9.4–12.3)
POTASSIUM SERPL-SCNC: 4.2 MMOL/L (ref 3.5–5)
PROTEIN UA: ABNORMAL MG/DL
RBC # BLD: 5.02 M/UL (ref 4.2–5.4)
SODIUM BLD-SCNC: 140 MMOL/L (ref 136–145)
SPECIFIC GRAVITY UA: 1.02
TOTAL PROTEIN: 7.2 G/DL (ref 6.6–8.7)
TRIGL SERPL-MCNC: 73 MG/DL (ref 0–149)
TSH SERPL DL<=0.05 MIU/L-ACNC: 2.05 UIU/ML (ref 0.27–4.2)
UROBILINOGEN, URINE: 0.2 E.U./DL
VITAMIN B-12: >2000 PG/ML (ref 211–946)
VITAMIN D 25-HYDROXY: 51.5 NG/ML
WBC # BLD: 7.1 K/UL (ref 4.8–10.8)
WBC UA: ABNORMAL /HPF (ref 0–5)

## 2018-10-17 ENCOUNTER — OFFICE VISIT (OUTPATIENT)
Dept: INTERNAL MEDICINE | Age: 53
End: 2018-10-17
Payer: COMMERCIAL

## 2018-10-17 VITALS
DIASTOLIC BLOOD PRESSURE: 68 MMHG | BODY MASS INDEX: 25.27 KG/M2 | OXYGEN SATURATION: 97 % | HEIGHT: 67 IN | WEIGHT: 161 LBS | RESPIRATION RATE: 18 BRPM | SYSTOLIC BLOOD PRESSURE: 110 MMHG | HEART RATE: 100 BPM

## 2018-10-17 DIAGNOSIS — Z12.39 SCREENING FOR BREAST CANCER: ICD-10-CM

## 2018-10-17 DIAGNOSIS — E55.9 VITAMIN D DEFICIENCY: ICD-10-CM

## 2018-10-17 DIAGNOSIS — K90.0 CELIAC DISEASE: ICD-10-CM

## 2018-10-17 DIAGNOSIS — E53.8 VITAMIN B12 DEFICIENCY: ICD-10-CM

## 2018-10-17 DIAGNOSIS — Z00.00 ANNUAL PHYSICAL EXAM: Primary | ICD-10-CM

## 2018-10-17 PROCEDURE — 99396 PREV VISIT EST AGE 40-64: CPT | Performed by: INTERNAL MEDICINE

## 2018-10-17 RX ORDER — HYDROXYZINE 50 MG/1
50 TABLET, FILM COATED ORAL NIGHTLY
COMMUNITY

## 2018-10-17 ASSESSMENT — ENCOUNTER SYMPTOMS
CONSTIPATION: 0
WHEEZING: 0
COUGH: 0
COLOR CHANGE: 0
ABDOMINAL PAIN: 0
EYE REDNESS: 0
SINUS PRESSURE: 0
TROUBLE SWALLOWING: 0
VOICE CHANGE: 0
DIARRHEA: 0
SORE THROAT: 0
EYE PAIN: 0
VOMITING: 0
NAUSEA: 0
BLOOD IN STOOL: 0
CHEST TIGHTNESS: 0

## 2018-10-17 NOTE — PROGRESS NOTES
are normal. No scleral icterus. Neck: Normal range of motion. Neck supple. No JVD present. No thyromegaly present. Cardiovascular: Normal rate, regular rhythm and normal heart sounds. No murmur heard. Pulmonary/Chest: Effort normal and breath sounds normal. No respiratory distress. She has no wheezes. She exhibits no tenderness. Abdominal: Soft. Bowel sounds are normal. She exhibits no mass. There is no tenderness. Musculoskeletal: Normal range of motion. She exhibits no edema or tenderness. Lymphadenopathy:     She has no cervical adenopathy. Neurological: She is alert and oriented to person, place, and time. She has normal reflexes. No cranial nerve deficit. Coordination normal.   Skin: Skin is warm and dry. No rash noted. No erythema. Psychiatric: She has a normal mood and affect.  Her behavior is normal.   Breast exam  Bilateral breast exam- symmetric, no nodules, no lymphadenopathy, no nipple discharge              ASSESSMENT/PLAN  Annual physical exam  *Screening mammogram, encounter for- schedule  * pap 2016 repeat 3 yrs  * cscope 2015 repeat 5 yrs  *  ( 99)- low fat diet     Vitamin B12 deficiency- her level is over 2000( 883)- as to her to decrease her B12 dose to 1 mg every Monday Wednesday and Friday     Vitamin D deficiency- vit d level is good- 51- cont to take 3000 + also 800 with her MVI     Idiopathic peripheral neuropathy- Complex symptoms that involve paresthesias, episodes or weakness, pain in lower legs-patient follows with Dr. Jm Ellison- she will get recommendations    Celiac disease  Now sx controlled  She will cont iron supplement as per hematologist              Orders Placed This Encounter   Procedures    LUIS A Screening Bilateral    Lipid Panel    Comprehensive Metabolic Panel    CBC Auto Differential    Vitamin D 25 Hydroxy    Urinalysis    TSH without Reflex    Vitamin B12    Ferritin     New Prescriptions    No medications on file      There are no Patient

## 2018-10-27 ENCOUNTER — PATIENT MESSAGE (OUTPATIENT)
Dept: INTERNAL MEDICINE | Age: 53
End: 2018-10-27

## 2018-10-27 DIAGNOSIS — N95.0 POST-MENOPAUSAL BLEEDING: Primary | ICD-10-CM

## 2018-10-29 NOTE — TELEPHONE ENCOUNTER
From: Heather Modi  To: Kat Dickson MD  Sent: 10/27/2018 12:35 PM CDT  Subject: Non-Urgent Medical Question    I thought the nurse said that I had gone all the way through menopause according to my hormone tests. I started having some bleeding with small clots today. Is that normal or do I need to make an appointment? Thanks.

## 2018-10-29 NOTE — TELEPHONE ENCOUNTER
Pt had a bit of spotting two years ago but before that was a few years she got off her birthcontrol aug 2017

## 2018-11-01 ENCOUNTER — HOSPITAL ENCOUNTER (OUTPATIENT)
Dept: ULTRASOUND IMAGING | Age: 53
Discharge: HOME | End: 2018-11-01
Payer: COMMERCIAL

## 2018-11-01 DIAGNOSIS — N95.0 POST-MENOPAUSAL BLEEDING: ICD-10-CM

## 2018-11-01 PROCEDURE — 76856 US EXAM PELVIC COMPLETE: CPT

## 2018-11-06 ENCOUNTER — OFFICE VISIT (OUTPATIENT)
Dept: OBGYN | Age: 53
End: 2018-11-06
Payer: COMMERCIAL

## 2018-11-06 ENCOUNTER — HOSPITAL ENCOUNTER (OUTPATIENT)
Age: 53
Setting detail: SPECIMEN
Discharge: HOME | End: 2018-11-06
Payer: COMMERCIAL

## 2018-11-06 VITALS
HEART RATE: 68 BPM | DIASTOLIC BLOOD PRESSURE: 64 MMHG | SYSTOLIC BLOOD PRESSURE: 108 MMHG | BODY MASS INDEX: 25.27 KG/M2 | WEIGHT: 161 LBS | HEIGHT: 67 IN

## 2018-11-06 DIAGNOSIS — N95.0 POSTMENOPAUSAL BLEEDING: Primary | ICD-10-CM

## 2018-11-06 DIAGNOSIS — R10.2 VULVAR PAIN: ICD-10-CM

## 2018-11-06 DIAGNOSIS — B02.29 POST HERPETIC NEURALGIA: ICD-10-CM

## 2018-11-06 DIAGNOSIS — Z12.4 SCREENING FOR CERVICAL CANCER: ICD-10-CM

## 2018-11-06 PROCEDURE — 99203 OFFICE O/P NEW LOW 30 MIN: CPT | Performed by: NURSE PRACTITIONER

## 2018-11-06 PROCEDURE — 58100 BIOPSY OF UTERUS LINING: CPT | Performed by: NURSE PRACTITIONER

## 2018-11-06 PROCEDURE — 88305 TISSUE EXAM BY PATHOLOGIST: CPT

## 2018-11-06 RX ORDER — IBUPROFEN 200 MG
200 TABLET ORAL PRN
COMMUNITY
End: 2021-10-21 | Stop reason: CLARIF

## 2018-11-06 ASSESSMENT — ENCOUNTER SYMPTOMS
GASTROINTESTINAL NEGATIVE: 1
ALLERGIC/IMMUNOLOGIC NEGATIVE: 1
RESPIRATORY NEGATIVE: 1
EYES NEGATIVE: 1
DIARRHEA: 0
CONSTIPATION: 0

## 2018-11-06 NOTE — PROGRESS NOTES
fever or for prolonged or severe pain or bleeding. She was advised to use OTC ibuprofen as needed for mild to moderate pain. She was advised to avoid vaginal intercourse for 48 hours or until the bleeding has completely stopped. Diagnosis Orders   1. Postmenopausal bleeding  16524 - SD BIOPSY OF UTERUS LINING    Surgical Pathology   2. Vulvar pain     3. Post herpetic neuralgia     4. Screening for cervical cancer  PAP SMEAR    Human papillomavirus (HPV) DNA Probe Thin Prep High Risk       MEDICATIONS:  No orders of the defined types were placed in this encounter. ORDERS:  Orders Placed This Encounter   Procedures    PAP SMEAR    Human papillomavirus (HPV) DNA Probe Thin Prep High Risk    Surgical Pathology    25375 - SD BIOPSY OF UTERUS LINING       PLAN:  Pap collected  Endo bx collected  No evidence of lesions, discussed probable post herpatic neuralgia, taking Neurontin  Discussed possibly starting vaginal estrogen cream for atrophy once endo bx results back    Patient Instructions     Patient Education        Vaginal Bleeding After Menopause: Care Instructions  Your Care Instructions    Vaginal bleeding after menopause can have many causes. Causes may include infection, inflammation, prescription hormones, abnormal growths, and injury. Your doctor may want you to have more tests to find the cause of your vaginal bleeding. Follow-up care is a key part of your treatment and safety. Be sure to make and go to all appointments, and call your doctor if you are having problems. It's also a good idea to know your test results and keep a list of the medicines you take. How can you care for yourself at home? · If your doctor gave you medicine, take it exactly as prescribed. Call your doctor if you think you are having a problem with your medicine. · Do not have sex or put anything inside your vagina until you talk with your doctor. · Do not douche. When should you call for help?   Call 911 anytime

## 2018-11-12 LAB
HPV TYPE 16: NOT DETECTED
HPV TYPE 18: NOT DETECTED
INTERPRETATION: NORMAL
OTHER HIGH RISK HPV: NOT DETECTED
SOURCE: NORMAL

## 2018-11-13 RX ORDER — MISOPROSTOL 200 UG/1
200 TABLET ORAL ONCE
Qty: 1 TABLET | Refills: 0 | Status: SHIPPED | OUTPATIENT
Start: 2018-11-13 | End: 2018-11-27 | Stop reason: ALTCHOICE

## 2018-11-16 PROBLEM — Z00.00 ANNUAL PHYSICAL EXAM: Status: RESOLVED | Noted: 2017-10-04 | Resolved: 2018-11-16

## 2018-11-26 ENCOUNTER — TELEPHONE (OUTPATIENT)
Dept: OBGYN | Age: 53
End: 2018-11-26

## 2018-11-26 NOTE — TELEPHONE ENCOUNTER
Pt asked when to inserted medication vaginal prior to procedure tomorrow. Informed pt tonight before bedtime.  Pt VU

## 2018-11-27 ENCOUNTER — PROCEDURE VISIT (OUTPATIENT)
Dept: OBGYN | Age: 53
End: 2018-11-27
Payer: COMMERCIAL

## 2018-11-27 ENCOUNTER — TELEPHONE (OUTPATIENT)
Dept: OBGYN | Age: 53
End: 2018-11-27

## 2018-11-27 VITALS
WEIGHT: 161 LBS | DIASTOLIC BLOOD PRESSURE: 81 MMHG | BODY MASS INDEX: 25.27 KG/M2 | HEIGHT: 67 IN | HEART RATE: 87 BPM | SYSTOLIC BLOOD PRESSURE: 121 MMHG

## 2018-11-27 DIAGNOSIS — N95.0 POSTMENOPAUSAL BLEEDING: ICD-10-CM

## 2018-11-27 DIAGNOSIS — N95.0 POSTMENOPAUSAL BLEEDING: Primary | ICD-10-CM

## 2018-11-27 LAB — FOLLICLE STIMULATING HORMONE: 99.3 MIU/ML

## 2018-11-27 PROCEDURE — 58100 BIOPSY OF UTERUS LINING: CPT | Performed by: NURSE PRACTITIONER

## 2018-11-30 ENCOUNTER — LAB REQUISITION (OUTPATIENT)
Dept: LAB | Facility: HOSPITAL | Age: 53
End: 2018-11-30

## 2018-11-30 DIAGNOSIS — Z00.00 ROUTINE GENERAL MEDICAL EXAMINATION AT A HEALTH CARE FACILITY: ICD-10-CM

## 2018-11-30 PROCEDURE — 88342 IMHCHEM/IMCYTCHM 1ST ANTB: CPT

## 2018-12-03 LAB
LAB AP CASE REPORT: NORMAL
PATH REPORT.FINAL DX SPEC: NORMAL

## 2018-12-11 LAB
ALBUMIN SERPL-MCNC: 4.6 G/DL (ref 3.5–5.2)
ALP BLD-CCNC: 85 U/L (ref 35–104)
ALT SERPL-CCNC: 15 U/L (ref 5–33)
ANION GAP SERPL CALCULATED.3IONS-SCNC: 10 MMOL/L (ref 7–19)
AST SERPL-CCNC: 19 U/L (ref 5–32)
BASOPHILS ABSOLUTE: 0 K/UL (ref 0–0.2)
BASOPHILS RELATIVE PERCENT: 0.3 % (ref 0–1)
BILIRUB SERPL-MCNC: 0.3 MG/DL (ref 0.2–1.2)
BUN BLDV-MCNC: 13 MG/DL (ref 6–20)
C-REACTIVE PROTEIN: 0.23 MG/DL (ref 0–0.5)
CALCIUM SERPL-MCNC: 9.6 MG/DL (ref 8.6–10)
CHLORIDE BLD-SCNC: 103 MMOL/L (ref 98–111)
CO2: 31 MMOL/L (ref 22–29)
CREAT SERPL-MCNC: 0.7 MG/DL (ref 0.5–0.9)
EOSINOPHILS ABSOLUTE: 0.2 K/UL (ref 0–0.6)
EOSINOPHILS RELATIVE PERCENT: 2.4 % (ref 0–5)
GFR NON-AFRICAN AMERICAN: >60
GLUCOSE BLD-MCNC: 81 MG/DL (ref 74–109)
HCT VFR BLD CALC: 47.2 % (ref 37–47)
HEMOGLOBIN: 14.6 G/DL (ref 12–16)
IMMATURE GRANULOCYTES #: 0 K/UL
LYMPHOCYTES ABSOLUTE: 2 K/UL (ref 1.1–4.5)
LYMPHOCYTES RELATIVE PERCENT: 32.8 % (ref 20–40)
MCH RBC QN AUTO: 27.9 PG (ref 27–31)
MCHC RBC AUTO-ENTMCNC: 30.9 G/DL (ref 33–37)
MCV RBC AUTO: 90.2 FL (ref 81–99)
MONOCYTES ABSOLUTE: 0.4 K/UL (ref 0–0.9)
MONOCYTES RELATIVE PERCENT: 6.3 % (ref 0–10)
NEUTROPHILS ABSOLUTE: 3.6 K/UL (ref 1.5–7.5)
NEUTROPHILS RELATIVE PERCENT: 58 % (ref 50–65)
PDW BLD-RTO: 12.7 % (ref 11.5–14.5)
PLATELET # BLD: 244 K/UL (ref 130–400)
PMV BLD AUTO: 10.2 FL (ref 9.4–12.3)
POTASSIUM SERPL-SCNC: 3.7 MMOL/L (ref 3.5–5)
RBC # BLD: 5.23 M/UL (ref 4.2–5.4)
SODIUM BLD-SCNC: 144 MMOL/L (ref 136–145)
TOTAL CK: 47 U/L (ref 26–192)
TOTAL PROTEIN: 7.7 G/DL (ref 6.6–8.7)
WBC # BLD: 6.2 K/UL (ref 4.8–10.8)

## 2018-12-13 LAB
CCP IGG ANTIBODIES: 4 UNITS (ref 0–19)
THYROGLOBULIN AB: <0.9 IU/ML (ref 0–4)
THYROID PEROXIDASE (TPO) ABS: 0.4 IU/ML (ref 0–9)

## 2018-12-14 LAB
ALBUMIN SERPL-MCNC: 4.45 G/DL (ref 3.75–5.01)
ALPHA-1-GLOBULIN: 0.33 G/DL (ref 0.19–0.46)
ALPHA-2-GLOBULIN: 0.76 G/DL (ref 0.48–1.05)
ANA IGG, ELISA: DETECTED
BETA GLOBULIN: 0.9 G/DL (ref 0.48–1.1)
GAMMA GLOBULIN: 1.37 G/DL (ref 0.62–1.51)
GLUTAMIC ACID DECARB AB: <5 IU/ML (ref 0–5)
IMMUNOFIXATION REFLEX: NORMAL
SPE/IFE INTERPRETATION: NORMAL
TOTAL PROTEIN: 7.8 G/DL (ref 6–8.3)

## 2018-12-16 LAB
DOUBLE STRANDED DNA AB, IGG: NORMAL
ENA TO RNP ANTIBODY: 0 AU/ML (ref 0–40)
ENA TO SMITH (SM) ANTIBODY: 0 AU/ML (ref 0–40)
ENA TO SSB (LA) ANTIBODY: 0 AU/ML (ref 0–40)
SCLERODERMA (SCL-70) AB: 0 AU/ML (ref 0–40)
SSA 52 (RO) (ENA) AB, IGG: 18 AU/ML (ref 0–40)
SSA 60 (RO) (ENA) AB, IGG: 0 AU/ML (ref 0–40)

## 2019-01-08 ENCOUNTER — HOSPITAL ENCOUNTER (OUTPATIENT)
Dept: WOMENS IMAGING | Age: 54
Discharge: HOME | End: 2019-01-08
Payer: COMMERCIAL

## 2019-01-08 DIAGNOSIS — Z12.39 BREAST CANCER SCREENING: Primary | ICD-10-CM

## 2019-01-08 DIAGNOSIS — Z12.39 BREAST CANCER SCREENING: ICD-10-CM

## 2019-01-08 PROCEDURE — 77067 SCR MAMMO BI INCL CAD: CPT

## 2019-02-06 ENCOUNTER — OFFICE VISIT (OUTPATIENT)
Dept: OTOLARYNGOLOGY | Facility: CLINIC | Age: 54
End: 2019-02-06

## 2019-02-06 VITALS
HEART RATE: 93 BPM | SYSTOLIC BLOOD PRESSURE: 109 MMHG | HEIGHT: 66 IN | WEIGHT: 162 LBS | DIASTOLIC BLOOD PRESSURE: 80 MMHG | TEMPERATURE: 97.8 F | BODY MASS INDEX: 26.03 KG/M2

## 2019-02-06 DIAGNOSIS — K11.7 XEROSTOMIA: Primary | ICD-10-CM

## 2019-02-06 PROCEDURE — 99202 OFFICE O/P NEW SF 15 MIN: CPT | Performed by: OTOLARYNGOLOGY

## 2019-02-06 PROCEDURE — 42405 BIOPSY OF SALIVARY GLAND: CPT | Performed by: OTOLARYNGOLOGY

## 2019-02-06 PROCEDURE — 88305 TISSUE EXAM BY PATHOLOGIST: CPT | Performed by: OTOLARYNGOLOGY

## 2019-02-06 RX ORDER — CHLORAL HYDRATE 500 MG
CAPSULE ORAL
COMMUNITY
Start: 2018-12-11 | End: 2021-05-07

## 2019-02-06 RX ORDER — SUCRALFATE 1 G/1
TABLET ORAL
Refills: 5 | COMMUNITY
Start: 2018-12-24

## 2019-02-06 RX ORDER — CYCLOSPORINE 0.5 MG/ML
EMULSION OPHTHALMIC
Status: ON HOLD | COMMUNITY
Start: 2013-08-21 | End: 2021-05-10

## 2019-02-06 RX ORDER — IBUPROFEN 200 MG
200 TABLET ORAL
COMMUNITY
End: 2021-05-07

## 2019-02-06 RX ORDER — TRIAMCINOLONE ACETONIDE 55 UG/1
SPRAY, METERED NASAL
COMMUNITY

## 2019-02-06 RX ORDER — HYDROXYZINE 50 MG/1
50 TABLET, FILM COATED ORAL NIGHTLY
COMMUNITY
Start: 2018-12-11

## 2019-02-06 RX ORDER — LORATADINE/PSEUDOEPHEDRINE 5 MG-120MG
TABLET, EXTENDED RELEASE 12 HR ORAL
Refills: 2 | COMMUNITY
Start: 2019-01-17 | End: 2022-03-03

## 2019-02-06 RX ORDER — CALCIUM CITRATE/VITAMIN D3 125-62.5
350 TABLET ORAL
COMMUNITY
End: 2020-06-05

## 2019-02-06 RX ORDER — GABAPENTIN 600 MG/1
600 TABLET ORAL 2 TIMES DAILY
COMMUNITY
Start: 2018-12-11

## 2019-02-06 RX ORDER — LANOLIN ALCOHOL/MO/W.PET/CERES
CREAM (GRAM) TOPICAL DAILY
COMMUNITY
Start: 2013-03-15 | End: 2022-10-28

## 2019-02-06 RX ORDER — EPINEPHRINE 0.3 MG/.3ML
INJECTION SUBCUTANEOUS
COMMUNITY
End: 2022-10-28

## 2019-02-06 NOTE — PROGRESS NOTES
Procedure   Lip Biopsy  Date/Time: 2/5/2019 8:04 PM  Performed by: Elpidio Arrieta MD  Authorized by: Elpidio Arrieta MD   Consent: Verbal consent obtained.  Risks and benefits: risks, benefits and alternatives were discussed  Preparation: Patient was prepped and draped in the usual sterile fashion.  Local anesthesia used: yes    Anesthesia:  Local anesthesia used: yes  Local Anesthetic: lidocaine 1% with epinephrine  Anesthetic total: 1 mL  Patient tolerance: Patient tolerated the procedure well with no immediate complications  Comments: Procedure Note    Procedure Details:    After injection of the local anesthesia, an incision was made over a minor salivary gland in the lower lip.  A minor salivary gland was teased out with a forcep and a scissors.  Pressure was held for hemostasis.  The lip was closed with a interrupted 4-0 chromic suture.    Findings: Normal-appearing salivary gland tissue    Complications:  None      Elpidio Arrieta MD  02/05/19  8:06 PM

## 2019-02-06 NOTE — PROGRESS NOTES
Maricel Mane   Patient Intake Note    Review of Systems  Review of Systems   Constitutional: Negative for chills, fatigue and fever.   HENT:        See HPI   Eyes: Positive for pain and itching.   Respiratory: Positive for choking.    Gastrointestinal: Positive for diarrhea. Negative for nausea and vomiting.   Musculoskeletal: Positive for neck pain and neck stiffness.   Neurological: Positive for dizziness and light-headedness.   Hematological: Bruises/bleeds easily.   Psychiatric/Behavioral: Positive for sleep disturbance.   All other systems reviewed and are negative.      QUALITY MEASURES    Body Mass Index Screening and Follow-Up Plan  Body mass index is 26.15 kg/m².  Patient's Body mass index is 26.15 kg/m². BMI is above normal parameters. Recommendations include: referral to primary care.    Tobacco Use: Screening and Cessation Intervention  Social History    Tobacco Use      Smoking status: Never Smoker      Smokeless tobacco: Never Used        Maricel Mane  2/6/2019  11:04 AM

## 2019-02-06 NOTE — PROGRESS NOTES
Elpidio Arrieta MD     Chief Complaint   Patient presents with   • Dry Mouth     Rule out Sjogren's disease, referred for minor salivary gland biopsy       History of Present Illness   Lizbeth Rivera is a  53 y.o. female was referred by Dr. Parth Joya of rheumatology to evaluate for possible minor salivary gland biopsy to rule out Sjogren's disease.  She had been having a lot of difficulty with stiff muscles and numbness type sensations of the face.  She had been evaluated with laboratory studies which did not show elevation of her Sjogren's titers.  She has had a history of dry eyes and dry mouth, joint pain and muscle stiffness, headaches, and sleep disturbances.  She has had a previous diagnosis of fibromyalgia.  Dr. Joya is requesting the biopsy to further rule out the possibility of Sjogren's disease with her constellation of symptoms.    Review of Systems  Reviewed per patient intake note    Past History:  Past Medical History:   Diagnosis Date   • Colin's esophagus    • Celiac disease    • Neuropathy      Past Surgical History:   Procedure Laterality Date   • CHOLECYSTECTOMY     • TONSILLECTOMY       Family History   Problem Relation Age of Onset   • Diabetes Maternal Grandmother    • Diabetes Maternal Grandfather      Social History     Tobacco Use   • Smoking status: Never Smoker   • Smokeless tobacco: Never Used   Substance Use Topics   • Alcohol use: No     Frequency: Never   • Drug use: No     Outpatient Medications Marked as Taking for the 2/6/19 encounter (Office Visit) with Elpidio Arrieta MD   Medication Sig Dispense Refill   • [facility] ALLERGY RELIEF-D12 5-120 MG per 12 hr tablet TAKE ONE TWICE A DAY AS NEEDED  2   • cycloSPORINE (RESTASIS) 0.05 % ophthalmic emulsion Apply  to eye(s) as directed by provider.     • EPINEPHrine (EPIPEN 2-FOSTER) 0.3 MG/0.3ML solution auto-injector injection EpiPen     • gabapentin (NEURONTIN) 600 MG tablet 600 mg Every 8 (Eight) Hours.     •  hydrOXYzine (ATARAX) 25 MG tablet 25 mg.     • ibuprofen (ADVIL,MOTRIN) 200 MG tablet Take 200 mg by mouth.     • Magnesium 100 MG tablet 2 (Two) Times a Day.     • MULTIPLE VITAMIN PO Take  by mouth.     • OMEGA 3-6-9 FATTY ACIDS PO omega 3,6,9 combination no.7   1600mg bid     • Omega-3 Fatty Acids (FISH OIL) 1000 MG capsule capsule Take  by mouth.     • Passion Flower-Valerian 500-500 MG capsule Take 350 mg by mouth.     • sucralfate (CARAFATE) 1 g tablet TAKE 1 TABLET BY MOUTH ON AN EMPTY STOMACH THREE TIMES A DAY BEFORE MEALS ORALLY 30 DAY(S)  5   • Triamcinolone Acetonide (NASACORT ALLERGY 24HR) 55 MCG/ACT nasal inhaler into the nostril(s) as directed by provider.     • vitamin B-12 (CYANOCOBALAMIN) 1000 MCG tablet Take  by mouth Daily.       Allergies:  Amoxicillin-pot clavulanate; Doxepin; Sulfamethoxazole-trimethoprim; Gluten meal; Glutethimides; Gemifloxacin; and Proton pump inhibitors    Vital Signs:   Temp:  [97.8 °F (36.6 °C)] 97.8 °F (36.6 °C)  Heart Rate:  [93] 93  BP: (109)/(80) 109/80    Physical Exam:   CONSTITUTIONAL: well nourished, well-developed, alert, oriented, in no acute distress   COMMUNICATION AND VOICE: able to communicate normally, normal voice quality  HEAD: normocephalic, no lesions, atraumatic, no tenderness, no masses   FACE: appearance normal, no lesions, no tenderness, no deformities, facial motion symmetric  SALIVARY GLANDS: parotid glands with no tenderness, no swelling, no masses, submandibular glands with normal size, nontender  EYES: ocular motility normal, eyelids normal, orbits normal, no proptosis, conjunctiva normal , pupils equal, round  HEARING: response to conversational voice normal bilaterally   EXTERNAL EARS: auricles without lesions  EXTERNAL EAR CANALS: normal ear canals without stenosis or significant cerumen  TYMPANIC MEMBRANES: tympanic membrane appearance normal, no lesions, no perforation, normal mobility, no fluid  EXTERNAL NOSE: structure normal, no  tenderness on palpation, no nasal discharge, no lesions, no evidence of trauma, nostrils patent  INTRANASAL EXAM: nasal mucosa normal, vestibule within normal limits, inferior turbinate normal, nasal septum without overtly obstructing anterior deviation  LIPS: structure normal, no tenderness on palpation, no lesions, no evidence of trauma  TEETH: dentition within normal limits for age  GUMS: gingivae healthy  ORAL MUCOSA: oral mucosa with diffuse dryness, no mucosal lesions   FLOOR OF MOUTH: Warthin's duct patent, mucosa normal  TONGUE: lingual mucosa normal without lesions, normal tongue mobility  PALATE: soft and hard palates with normal mucosa and structure  OROPHARYNX: oropharyngeal mucosa normal, tonsils absent with normal fossas  NECK: neck appearance normal, no masses or tenderness  THYROID: no overt thyromegaly, no tenderness, nodules or mass present on palpation, position midline   LYMPH NODES: no lymphadenopathy  CHEST/RESPIRATORY: respiratory effort normal  CARDIOVASCULAR: extremities without cyanosis or edema, no overt jugulovenous distension present  NEUROLOGIC/PSYCHIATRIC: oriented appropriately for age, mood normal, affect appropriate, cranial nerves intact grossly unless specifically mentioned above         Assessment   1. Xerostomia    Rule out Sjogren's disease    Plan    MINOR SALIVARY GLAND BIOPSY: The risks, benefits, and alternatives of the operation including but not limited to pain, numbness, bleeding, infection, scarring, change in the ability to eat or speak and risks of the anesthesia, were discussed full with the patient and questions were answered. No guarantees were made or implied. Possible need for further therapy depending on the pathologic outcome was discussed.      Return in about 3 weeks (around 2/27/2019) for follow up with midlevel.    Elpidio Arrieta MD  02/06/19  11:50 AM

## 2019-02-12 LAB
CYTO UR: NORMAL
LAB AP CASE REPORT: NORMAL
LAB AP CLINICAL INFORMATION: NORMAL
PATH REPORT.FINAL DX SPEC: NORMAL
PATH REPORT.GROSS SPEC: NORMAL

## 2019-07-30 ENCOUNTER — OFFICE VISIT (OUTPATIENT)
Dept: INTERNAL MEDICINE | Age: 54
End: 2019-07-30
Payer: COMMERCIAL

## 2019-07-30 ENCOUNTER — HOSPITAL ENCOUNTER (OUTPATIENT)
Dept: GENERAL RADIOLOGY | Age: 54
Discharge: HOME | End: 2019-07-30
Payer: COMMERCIAL

## 2019-07-30 ENCOUNTER — TELEPHONE (OUTPATIENT)
Dept: INTERNAL MEDICINE | Age: 54
End: 2019-07-30

## 2019-07-30 VITALS
DIASTOLIC BLOOD PRESSURE: 84 MMHG | RESPIRATION RATE: 20 BRPM | HEART RATE: 90 BPM | SYSTOLIC BLOOD PRESSURE: 128 MMHG | TEMPERATURE: 99.1 F | HEIGHT: 67 IN | OXYGEN SATURATION: 97 % | BODY MASS INDEX: 26.21 KG/M2 | WEIGHT: 167 LBS

## 2019-07-30 DIAGNOSIS — M54.6 MIDLINE THORACIC BACK PAIN, UNSPECIFIED CHRONICITY: ICD-10-CM

## 2019-07-30 DIAGNOSIS — R53.83 OTHER FATIGUE: ICD-10-CM

## 2019-07-30 DIAGNOSIS — H57.89 REDNESS OF EYE: ICD-10-CM

## 2019-07-30 DIAGNOSIS — R07.89 LEFT-SIDED CHEST WALL PAIN: ICD-10-CM

## 2019-07-30 DIAGNOSIS — R07.89 LEFT-SIDED CHEST WALL PAIN: Primary | ICD-10-CM

## 2019-07-30 LAB
ALBUMIN SERPL-MCNC: 4.4 G/DL (ref 3.5–5.2)
ALP BLD-CCNC: 81 U/L (ref 35–104)
ALT SERPL-CCNC: 14 U/L (ref 5–33)
ANION GAP SERPL CALCULATED.3IONS-SCNC: 13 MMOL/L (ref 7–19)
AST SERPL-CCNC: 20 U/L (ref 5–32)
BASOPHILS ABSOLUTE: 0 K/UL (ref 0–0.2)
BASOPHILS RELATIVE PERCENT: 0.2 % (ref 0–1)
BILIRUB SERPL-MCNC: 0.5 MG/DL (ref 0.2–1.2)
BUN BLDV-MCNC: 10 MG/DL (ref 6–20)
CALCIUM SERPL-MCNC: 9.4 MG/DL (ref 8.6–10)
CHLORIDE BLD-SCNC: 102 MMOL/L (ref 98–111)
CO2: 28 MMOL/L (ref 22–29)
CREAT SERPL-MCNC: 0.7 MG/DL (ref 0.5–0.9)
EOSINOPHILS ABSOLUTE: 0.1 K/UL (ref 0–0.6)
EOSINOPHILS RELATIVE PERCENT: 2.2 % (ref 0–5)
GFR NON-AFRICAN AMERICAN: >60
GLUCOSE BLD-MCNC: 86 MG/DL (ref 74–109)
HCT VFR BLD CALC: 47.2 % (ref 37–47)
HEMOGLOBIN: 14.8 G/DL (ref 12–16)
LYMPHOCYTES ABSOLUTE: 1.7 K/UL (ref 1.1–4.5)
LYMPHOCYTES RELATIVE PERCENT: 30.4 % (ref 20–40)
MCH RBC QN AUTO: 28.1 PG (ref 27–31)
MCHC RBC AUTO-ENTMCNC: 31.4 G/DL (ref 33–37)
MCV RBC AUTO: 89.7 FL (ref 81–99)
MONOCYTES ABSOLUTE: 0.3 K/UL (ref 0–0.9)
MONOCYTES RELATIVE PERCENT: 5.9 % (ref 0–10)
NEUTROPHILS ABSOLUTE: 3.4 K/UL (ref 1.5–7.5)
NEUTROPHILS RELATIVE PERCENT: 61.1 % (ref 50–65)
PDW BLD-RTO: 13.4 % (ref 11.5–14.5)
PLATELET # BLD: 241 K/UL (ref 130–400)
PMV BLD AUTO: 10.3 FL (ref 9.4–12.3)
POTASSIUM SERPL-SCNC: 4.3 MMOL/L (ref 3.5–5)
RBC # BLD: 5.26 M/UL (ref 4.2–5.4)
SODIUM BLD-SCNC: 143 MMOL/L (ref 136–145)
TOTAL PROTEIN: 7.8 G/DL (ref 6.6–8.7)
TSH SERPL DL<=0.05 MIU/L-ACNC: 1.78 UIU/ML (ref 0.27–4.2)
WBC # BLD: 5.6 K/UL (ref 4.8–10.8)

## 2019-07-30 PROCEDURE — 71046 X-RAY EXAM CHEST 2 VIEWS: CPT

## 2019-07-30 PROCEDURE — 99214 OFFICE O/P EST MOD 30 MIN: CPT | Performed by: INTERNAL MEDICINE

## 2019-07-30 RX ORDER — KETOROLAC TROMETHAMINE 5 MG/ML
1 SOLUTION OPHTHALMIC 4 TIMES DAILY
COMMUNITY
End: 2021-03-31 | Stop reason: CLARIF

## 2019-07-30 RX ORDER — BIOTIN 10 MG
10 TABLET ORAL DAILY
COMMUNITY
End: 2021-10-21 | Stop reason: CLARIF

## 2019-07-30 ASSESSMENT — PATIENT HEALTH QUESTIONNAIRE - PHQ9
SUM OF ALL RESPONSES TO PHQ9 QUESTIONS 1 & 2: 0
SUM OF ALL RESPONSES TO PHQ QUESTIONS 1-9: 0
SUM OF ALL RESPONSES TO PHQ QUESTIONS 1-9: 0
2. FEELING DOWN, DEPRESSED OR HOPELESS: 0
1. LITTLE INTEREST OR PLEASURE IN DOING THINGS: 0

## 2019-07-30 ASSESSMENT — ENCOUNTER SYMPTOMS
SORE THROAT: 0
ABDOMINAL PAIN: 0
EYE REDNESS: 1
CHEST TIGHTNESS: 0
WHEEZING: 0
COUGH: 0
CONSTIPATION: 0

## 2019-07-30 NOTE — PROGRESS NOTES
Itching      Magnesium 100 MG TABS Take 100 mg by mouth 2 times daily      gabapentin (NEURONTIN) 300 MG capsule Take 2 caps by mouth three daily. (Patient taking differently: 3 times daily. Take 2 caps by mouth three daily.) 540 capsule 4    Omega 3-6-9 CAPS Take by mouth      b complex vitamins capsule Take 1 capsule by mouth daily      Multiple Vitamin (MULTI VITAMIN DAILY PO) Take by mouth      methocarbamol (ROBAXIN) 500 MG tablet Take 1 tablet by mouth 3 times daily 90 tablet 5    sucralfate (CARAFATE) 1 GM tablet DISSOLVE 1 TABLET INTO SLURRY THREE TIMES DAILY BEFORE MEALS  4    triamcinolone (NASACORT ALLERGY 24HR) 55 MCG/ACT nasal inhaler 2 sprays by Nasal route daily       No current facility-administered medications for this visit. Allergies   Allergen Reactions    Doxepin Itching    Augmentin [Amoxicillin-Pot Clavulanate]     Bactrim [Sulfamethoxazole-Trimethoprim]     Factive [Gemifloxacin]     Gluten Meal     Glutethimides     Proton Pump Inhibitors      Social History     Tobacco Use    Smoking status: Never Smoker    Smokeless tobacco: Never Used   Substance Use Topics    Alcohol use: No      Family History   Problem Relation Age of Onset    Crohn's Disease Son        Review of Systems   Constitutional: Positive for fatigue. Negative for chills and fever. HENT: Positive for congestion. Negative for ear pain, nosebleeds, postnasal drip and sore throat. Eyes: Positive for redness. Respiratory: Negative for cough, chest tightness and wheezing. Chest wall pain on breathing and movement, left. Cardiovascular: Negative for chest pain, palpitations and leg swelling. Gastrointestinal: Negative for abdominal pain and constipation. Genitourinary: Negative for dysuria and urgency. Musculoskeletal: Negative. Negative for arthralgias. Skin: Negative for rash. Neurological: Negative for dizziness and headaches. Psychiatric/Behavioral: Negative.       Vitals:

## 2019-10-07 ENCOUNTER — TELEPHONE (OUTPATIENT)
Dept: INTERNAL MEDICINE | Age: 54
End: 2019-10-07

## 2019-10-18 DIAGNOSIS — E55.9 VITAMIN D DEFICIENCY: ICD-10-CM

## 2019-10-18 DIAGNOSIS — Z00.00 ANNUAL PHYSICAL EXAM: ICD-10-CM

## 2019-10-18 DIAGNOSIS — E53.8 VITAMIN B12 DEFICIENCY: ICD-10-CM

## 2019-10-18 DIAGNOSIS — Z12.39 SCREENING FOR BREAST CANCER: ICD-10-CM

## 2019-10-18 DIAGNOSIS — K90.0 CELIAC DISEASE: ICD-10-CM

## 2019-10-18 LAB
ALBUMIN SERPL-MCNC: 4.6 G/DL (ref 3.5–5.2)
ALP BLD-CCNC: 100 U/L (ref 35–104)
ALT SERPL-CCNC: 20 U/L (ref 5–33)
ANION GAP SERPL CALCULATED.3IONS-SCNC: 17 MMOL/L (ref 7–19)
AST SERPL-CCNC: 23 U/L (ref 5–32)
BASOPHILS ABSOLUTE: 0 K/UL (ref 0–0.2)
BASOPHILS RELATIVE PERCENT: 0.3 % (ref 0–1)
BILIRUB SERPL-MCNC: 0.7 MG/DL (ref 0.2–1.2)
BILIRUBIN URINE: NEGATIVE
BLOOD, URINE: NEGATIVE
BUN BLDV-MCNC: 11 MG/DL (ref 6–20)
CALCIUM SERPL-MCNC: 10.1 MG/DL (ref 8.6–10)
CHLORIDE BLD-SCNC: 107 MMOL/L (ref 98–111)
CHOLESTEROL, TOTAL: 184 MG/DL (ref 160–199)
CLARITY: CLEAR
CO2: 23 MMOL/L (ref 22–29)
COLOR: NORMAL
CREAT SERPL-MCNC: 0.7 MG/DL (ref 0.5–0.9)
EOSINOPHILS ABSOLUTE: 0.2 K/UL (ref 0–0.6)
EOSINOPHILS RELATIVE PERCENT: 2.8 % (ref 0–5)
FERRITIN: 79.5 NG/ML (ref 13–150)
GFR NON-AFRICAN AMERICAN: >60
GLUCOSE BLD-MCNC: 98 MG/DL (ref 74–109)
GLUCOSE URINE: NEGATIVE MG/DL
HCT VFR BLD CALC: 49.4 % (ref 37–47)
HDLC SERPL-MCNC: 58 MG/DL (ref 65–121)
HEMOGLOBIN: 15.6 G/DL (ref 12–16)
IMMATURE GRANULOCYTES #: 0 K/UL
KETONES, URINE: NEGATIVE MG/DL
LDL CHOLESTEROL CALCULATED: 110 MG/DL
LEUKOCYTE ESTERASE, URINE: NEGATIVE
LYMPHOCYTES ABSOLUTE: 1.7 K/UL (ref 1.1–4.5)
LYMPHOCYTES RELATIVE PERCENT: 23.4 % (ref 20–40)
MCH RBC QN AUTO: 28 PG (ref 27–31)
MCHC RBC AUTO-ENTMCNC: 31.6 G/DL (ref 33–37)
MCV RBC AUTO: 88.7 FL (ref 81–99)
MONOCYTES ABSOLUTE: 0.4 K/UL (ref 0–0.9)
MONOCYTES RELATIVE PERCENT: 5.7 % (ref 0–10)
NEUTROPHILS ABSOLUTE: 4.8 K/UL (ref 1.5–7.5)
NEUTROPHILS RELATIVE PERCENT: 67.5 % (ref 50–65)
NITRITE, URINE: NEGATIVE
PDW BLD-RTO: 12.9 % (ref 11.5–14.5)
PH UA: 7 (ref 5–8)
PLATELET # BLD: 251 K/UL (ref 130–400)
PMV BLD AUTO: 9.8 FL (ref 9.4–12.3)
POTASSIUM SERPL-SCNC: 4.7 MMOL/L (ref 3.5–5)
PROTEIN UA: NEGATIVE MG/DL
RBC # BLD: 5.57 M/UL (ref 4.2–5.4)
SODIUM BLD-SCNC: 147 MMOL/L (ref 136–145)
SPECIFIC GRAVITY UA: 1.02 (ref 1–1.03)
TOTAL PROTEIN: 8.1 G/DL (ref 6.6–8.7)
TRIGL SERPL-MCNC: 81 MG/DL (ref 0–149)
TSH SERPL DL<=0.05 MIU/L-ACNC: 2.19 UIU/ML (ref 0.27–4.2)
UROBILINOGEN, URINE: 0.2 E.U./DL
VITAMIN B-12: 1389 PG/ML (ref 211–946)
VITAMIN D 25-HYDROXY: 35.8 NG/ML
WBC # BLD: 7.1 K/UL (ref 4.8–10.8)

## 2019-10-21 ENCOUNTER — OFFICE VISIT (OUTPATIENT)
Dept: INTERNAL MEDICINE | Age: 54
End: 2019-10-21
Payer: COMMERCIAL

## 2019-10-21 VITALS
HEART RATE: 95 BPM | BODY MASS INDEX: 27.31 KG/M2 | WEIGHT: 174 LBS | DIASTOLIC BLOOD PRESSURE: 86 MMHG | OXYGEN SATURATION: 99 % | RESPIRATION RATE: 18 BRPM | HEIGHT: 67 IN | SYSTOLIC BLOOD PRESSURE: 124 MMHG

## 2019-10-21 DIAGNOSIS — R23.2 HOT FLASHES: ICD-10-CM

## 2019-10-21 DIAGNOSIS — K90.0 CELIAC DISEASE: ICD-10-CM

## 2019-10-21 DIAGNOSIS — G60.9 IDIOPATHIC PERIPHERAL NEUROPATHY: ICD-10-CM

## 2019-10-21 DIAGNOSIS — M79.7 FIBROMYALGIA: ICD-10-CM

## 2019-10-21 DIAGNOSIS — Z00.00 ANNUAL PHYSICAL EXAM: Primary | ICD-10-CM

## 2019-10-21 DIAGNOSIS — Z78.0 MENOPAUSE: ICD-10-CM

## 2019-10-21 DIAGNOSIS — E55.9 VITAMIN D DEFICIENCY: ICD-10-CM

## 2019-10-21 DIAGNOSIS — Z12.39 SCREENING FOR BREAST CANCER: ICD-10-CM

## 2019-10-21 DIAGNOSIS — E53.8 VITAMIN B12 DEFICIENCY: ICD-10-CM

## 2019-10-21 LAB — PROGESTERONE LEVEL: 0.11 NG/ML

## 2019-10-21 PROCEDURE — 99396 PREV VISIT EST AGE 40-64: CPT | Performed by: INTERNAL MEDICINE

## 2019-10-21 RX ORDER — DOXYCYCLINE HYCLATE 20 MG
TABLET ORAL
Refills: 6 | COMMUNITY
Start: 2019-10-17 | End: 2020-04-21

## 2019-10-21 RX ORDER — CYCLOSPORINE 0.5 MG/ML
EMULSION OPHTHALMIC
COMMUNITY
End: 2021-03-31 | Stop reason: CLARIF

## 2019-10-21 RX ORDER — FERROUS SULFATE 325(65) MG
TABLET ORAL
COMMUNITY
End: 2021-03-31 | Stop reason: CLARIF

## 2019-10-21 ASSESSMENT — ENCOUNTER SYMPTOMS
EYE PAIN: 0
NAUSEA: 0
TROUBLE SWALLOWING: 0
COLOR CHANGE: 0
SORE THROAT: 0
COUGH: 0
CHEST TIGHTNESS: 0
DIARRHEA: 0
ABDOMINAL PAIN: 0
EYE REDNESS: 0
VOICE CHANGE: 0
SINUS PRESSURE: 0
CONSTIPATION: 0
VOMITING: 0
WHEEZING: 0
BLOOD IN STOOL: 0

## 2019-10-25 LAB — TESTOSTERONE FREE: 1.7 PG/ML (ref 1.1–5.8)

## 2020-01-08 LAB — ESTROGEN TOTAL: 40 NG/ML

## 2020-03-03 ENCOUNTER — OFFICE VISIT (OUTPATIENT)
Dept: URGENT CARE | Age: 55
End: 2020-03-03
Payer: COMMERCIAL

## 2020-03-03 VITALS
DIASTOLIC BLOOD PRESSURE: 86 MMHG | HEART RATE: 107 BPM | OXYGEN SATURATION: 98 % | RESPIRATION RATE: 18 BRPM | SYSTOLIC BLOOD PRESSURE: 135 MMHG | BODY MASS INDEX: 27.15 KG/M2 | HEIGHT: 67 IN | TEMPERATURE: 98.6 F | WEIGHT: 173 LBS

## 2020-03-03 PROCEDURE — G0405 EKG INTERPRET & REPORT PREVE: HCPCS | Performed by: NURSE PRACTITIONER

## 2020-03-03 PROCEDURE — 93041 RHYTHM ECG TRACING: CPT | Performed by: NURSE PRACTITIONER

## 2020-03-03 NOTE — PROGRESS NOTES
methocarbamol (ROBAXIN) 500 MG tablet Take 1 tablet by mouth 3 times daily 90 tablet 5    sucralfate (CARAFATE) 1 GM tablet DISSOLVE 1 TABLET INTO SLURRY THREE TIMES DAILY BEFORE MEALS  4    triamcinolone (NASACORT ALLERGY 24HR) 55 MCG/ACT nasal inhaler 2 sprays by Nasal route daily      ferrous sulfate 325 (65 Fe) MG tablet ferrous sulfate 325 mg (65 mg iron) tablet   1 TABLET DAILY. TAKE ON EMPTY STOMACH WITH OJ OR VITAMIN C TAB.  cycloSPORINE (RESTASIS) 0.05 % ophthalmic emulsion Restasis MultiDose 0.05 % eye drops       No current facility-administered medications for this visit. Allergies   Allergen Reactions    Doxepin Itching    Augmentin [Amoxicillin-Pot Clavulanate]     Bactrim [Sulfamethoxazole-Trimethoprim]     Factive [Gemifloxacin]     Gluten Meal     Glutethimides     Proton Pump Inhibitors        Health Maintenance   Topic Date Due    Shingles Vaccine (1 of 2) 12/25/2017    Flu vaccine (1) 10/13/2027 (Originally 9/1/2019)    Breast cancer screen  01/08/2021    Cervical cancer screen  11/06/2023    Lipid screen  10/18/2024    Colon cancer screen colonoscopy  01/23/2025    DTaP/Tdap/Td vaccine (2 - Td) 10/04/2027    HIV screen  Completed    Hepatitis A vaccine  Aged Out    Hepatitis B vaccine  Aged Out    Hib vaccine  Aged Out    Meningococcal (ACWY) vaccine  Aged Out    Pneumococcal 0-64 years Vaccine  Aged Out       Subjective:     Review of Systems    Objective:     Physical Exam  /86   Pulse 107   Temp 98.6 °F (37 °C)   Resp 18   Ht 5' 7\" (1.702 m)   Wt 173 lb (78.5 kg)   SpO2 98%   BMI 27.10 kg/m²     Assessment:       Diagnosis Orders   1. Fatigue, unspecified type  CO EKG INTERPRET & REPORT PREVE    CO RHYTHM ECG, TRACING       Plan:      Orders Placed This Encounter   Procedures    CO EKG INTERPRET & REPORT PREVE    CO RHYTHM ECG, TRACING       No follow-ups on file. No orders of the defined types were placed in this encounter.       Patient

## 2020-03-05 ENCOUNTER — HOSPITAL ENCOUNTER (EMERGENCY)
Facility: HOSPITAL | Age: 55
Discharge: HOME | End: 2020-03-05
Admitting: EMERGENCY MEDICINE

## 2020-03-05 ENCOUNTER — APPOINTMENT (OUTPATIENT)
Dept: GENERAL RADIOLOGY | Facility: HOSPITAL | Age: 55
End: 2020-03-05

## 2020-03-05 ENCOUNTER — TELEPHONE (OUTPATIENT)
Dept: INTERNAL MEDICINE | Age: 55
End: 2020-03-05

## 2020-03-05 VITALS
TEMPERATURE: 97.8 F | RESPIRATION RATE: 18 BRPM | WEIGHT: 172 LBS | HEART RATE: 80 BPM | DIASTOLIC BLOOD PRESSURE: 89 MMHG | SYSTOLIC BLOOD PRESSURE: 130 MMHG | OXYGEN SATURATION: 97 % | BODY MASS INDEX: 27 KG/M2 | HEIGHT: 67 IN

## 2020-03-05 DIAGNOSIS — R10.13 EPIGASTRIC PAIN: Primary | ICD-10-CM

## 2020-03-05 LAB
ALBUMIN SERPL-MCNC: 4.4 G/DL (ref 3.5–5.2)
ALBUMIN/GLOB SERPL: 1.3 G/DL
ALP SERPL-CCNC: 82 U/L (ref 39–117)
ALT SERPL W P-5'-P-CCNC: 22 U/L (ref 1–33)
ANION GAP SERPL CALCULATED.3IONS-SCNC: 10 MMOL/L (ref 5–15)
APTT PPP: 31.7 SECONDS (ref 24.1–35)
AST SERPL-CCNC: 25 U/L (ref 1–32)
BASOPHILS # BLD AUTO: 0.01 10*3/MM3 (ref 0–0.2)
BASOPHILS NFR BLD AUTO: 0.1 % (ref 0–1.5)
BILIRUB SERPL-MCNC: 0.4 MG/DL (ref 0.2–1.2)
BUN BLD-MCNC: 11 MG/DL (ref 6–20)
BUN/CREAT SERPL: 16.9 (ref 7–25)
CALCIUM SPEC-SCNC: 9.9 MG/DL (ref 8.6–10.5)
CHLORIDE SERPL-SCNC: 103 MMOL/L (ref 98–107)
CO2 SERPL-SCNC: 27 MMOL/L (ref 22–29)
CREAT BLD-MCNC: 0.65 MG/DL (ref 0.57–1)
DEPRECATED RDW RBC AUTO: 39 FL (ref 37–54)
EOSINOPHIL # BLD AUTO: 0.08 10*3/MM3 (ref 0–0.4)
EOSINOPHIL NFR BLD AUTO: 1.2 % (ref 0.3–6.2)
ERYTHROCYTE [DISTWIDTH] IN BLOOD BY AUTOMATED COUNT: 13 % (ref 12.3–15.4)
GFR SERPL CREATININE-BSD FRML MDRD: 96 ML/MIN/1.73
GLOBULIN UR ELPH-MCNC: 3.3 GM/DL
GLUCOSE BLD-MCNC: 92 MG/DL (ref 65–99)
HCT VFR BLD AUTO: 44.8 % (ref 34–46.6)
HGB BLD-MCNC: 15 G/DL (ref 12–15.9)
HOLD SPECIMEN: NORMAL
IMM GRANULOCYTES # BLD AUTO: 0.02 10*3/MM3 (ref 0–0.05)
IMM GRANULOCYTES NFR BLD AUTO: 0.3 % (ref 0–0.5)
INR PPP: 0.99 (ref 0.91–1.09)
LIPASE SERPL-CCNC: 18 U/L (ref 13–60)
LYMPHOCYTES # BLD AUTO: 2.18 10*3/MM3 (ref 0.7–3.1)
LYMPHOCYTES NFR BLD AUTO: 32.3 % (ref 19.6–45.3)
MAGNESIUM SERPL-MCNC: 2.2 MG/DL (ref 1.6–2.6)
MCH RBC QN AUTO: 27.9 PG (ref 26.6–33)
MCHC RBC AUTO-ENTMCNC: 33.5 G/DL (ref 31.5–35.7)
MCV RBC AUTO: 83.3 FL (ref 79–97)
MONOCYTES # BLD AUTO: 0.43 10*3/MM3 (ref 0.1–0.9)
MONOCYTES NFR BLD AUTO: 6.4 % (ref 5–12)
NEUTROPHILS # BLD AUTO: 4.03 10*3/MM3 (ref 1.7–7)
NEUTROPHILS NFR BLD AUTO: 59.7 % (ref 42.7–76)
NRBC BLD AUTO-RTO: 0 /100 WBC (ref 0–0.2)
NT-PROBNP SERPL-MCNC: 31.6 PG/ML (ref 5–900)
PLATELET # BLD AUTO: 255 10*3/MM3 (ref 140–450)
PMV BLD AUTO: 9.6 FL (ref 6–12)
POTASSIUM BLD-SCNC: 4 MMOL/L (ref 3.5–5.2)
PROT SERPL-MCNC: 7.7 G/DL (ref 6–8.5)
PROTHROMBIN TIME: 13 SECONDS (ref 11.9–14.6)
RBC # BLD AUTO: 5.38 10*6/MM3 (ref 3.77–5.28)
SODIUM BLD-SCNC: 140 MMOL/L (ref 136–145)
TROPONIN T SERPL-MCNC: <0.01 NG/ML (ref 0–0.03)
TSH SERPL DL<=0.05 MIU/L-ACNC: 1.49 UIU/ML (ref 0.27–4.2)
WBC NRBC COR # BLD: 6.75 10*3/MM3 (ref 3.4–10.8)

## 2020-03-05 PROCEDURE — 84484 ASSAY OF TROPONIN QUANT: CPT | Performed by: PHYSICIAN ASSISTANT

## 2020-03-05 PROCEDURE — 99283 EMERGENCY DEPT VISIT LOW MDM: CPT

## 2020-03-05 PROCEDURE — 85610 PROTHROMBIN TIME: CPT | Performed by: PHYSICIAN ASSISTANT

## 2020-03-05 PROCEDURE — 71045 X-RAY EXAM CHEST 1 VIEW: CPT

## 2020-03-05 PROCEDURE — 85730 THROMBOPLASTIN TIME PARTIAL: CPT | Performed by: PHYSICIAN ASSISTANT

## 2020-03-05 PROCEDURE — 80053 COMPREHEN METABOLIC PANEL: CPT | Performed by: PHYSICIAN ASSISTANT

## 2020-03-05 PROCEDURE — 93005 ELECTROCARDIOGRAM TRACING: CPT | Performed by: PHYSICIAN ASSISTANT

## 2020-03-05 PROCEDURE — 93005 ELECTROCARDIOGRAM TRACING: CPT | Performed by: EMERGENCY MEDICINE

## 2020-03-05 PROCEDURE — 83690 ASSAY OF LIPASE: CPT | Performed by: PHYSICIAN ASSISTANT

## 2020-03-05 PROCEDURE — 83880 ASSAY OF NATRIURETIC PEPTIDE: CPT | Performed by: PHYSICIAN ASSISTANT

## 2020-03-05 PROCEDURE — 84443 ASSAY THYROID STIM HORMONE: CPT | Performed by: PHYSICIAN ASSISTANT

## 2020-03-05 PROCEDURE — 85025 COMPLETE CBC W/AUTO DIFF WBC: CPT | Performed by: PHYSICIAN ASSISTANT

## 2020-03-05 PROCEDURE — 83735 ASSAY OF MAGNESIUM: CPT | Performed by: PHYSICIAN ASSISTANT

## 2020-03-05 PROCEDURE — 93010 ELECTROCARDIOGRAM REPORT: CPT | Performed by: INTERNAL MEDICINE

## 2020-03-05 RX ORDER — FAMOTIDINE 20 MG/1
20 TABLET, FILM COATED ORAL 2 TIMES DAILY
COMMUNITY

## 2020-03-05 NOTE — ED PROVIDER NOTES
Subjective   History of Present Illness  54-year-old female presents with multiple complaints.  She reports she is been having intermittent epigastric pain and chest pain that feels sharp and heaviness.  She reports dyspnea on exertion.  She reports her epigastric pain seems to be worse with eating but is not present causing symptoms.  The patient reports weight gain and fatigue.  She reports she thought her dyspnea was secondary to weight gain.  Some I have told her her thyroid levels could be off.  She had seen her primary care 2 days ago was told that she had a prominent R wave on her EKG and was encouraged to follow-up in the emergency room.  The patient really felt fine at that time and did not want to come in.  Review of Systems   All other systems reviewed and are negative.      Past Medical History:   Diagnosis Date   • Colin's esophagus    • Celiac disease    • Neuropathy        Allergies   Allergen Reactions   • Amoxicillin-Pot Clavulanate Shortness Of Breath   • Doxepin Itching   • Sulfamethoxazole-Trimethoprim Shortness Of Breath   • Gluten Meal GI Intolerance   • Glutethimides GI Intolerance   • Gemifloxacin Palpitations   • Latex Rash   • Proton Pump Inhibitors Palpitations       Past Surgical History:   Procedure Laterality Date   • CHOLECYSTECTOMY     • TONSILLECTOMY         Family History   Problem Relation Age of Onset   • Diabetes Maternal Grandmother    • Diabetes Maternal Grandfather        Social History     Socioeconomic History   • Marital status:      Spouse name: Not on file   • Number of children: Not on file   • Years of education: Not on file   • Highest education level: Not on file   Tobacco Use   • Smoking status: Never Smoker   • Smokeless tobacco: Never Used   Substance and Sexual Activity   • Alcohol use: No     Frequency: Never   • Drug use: No   • Sexual activity: Defer           Objective   Physical Exam   Constitutional: She is oriented to person, place, and time. She  appears well-developed and well-nourished.   HENT:   Head: Normocephalic and atraumatic.   Eyes: Pupils are equal, round, and reactive to light. EOM are normal. Left eye exhibits exudate.   Neck: Normal range of motion.   Cardiovascular: Normal rate, regular rhythm, intact distal pulses and normal pulses.   Pulmonary/Chest: Effort normal and breath sounds normal.   Abdominal: Soft. There is no tenderness.   Musculoskeletal: Normal range of motion.        Right lower leg: Normal.        Left lower leg: Normal.   Neurological: She is alert and oriented to person, place, and time.   Skin: Skin is warm. Capillary refill takes less than 2 seconds.   Psychiatric: She has a normal mood and affect. Her behavior is normal.   Nursing note and vitals reviewed.      Procedures           ED Course           XR Chest 1 View   Final Result   Impression:       No acute cardiopulmonary disease.       This report was finalized on 03/05/2020 17:04 by Dr. Floridalma Hummel MD.        Labs Reviewed   CBC WITH AUTO DIFFERENTIAL - Abnormal; Notable for the following components:       Result Value    RBC 5.38 (*)     All other components within normal limits   APTT - Normal   PROTIME-INR - Normal   LIPASE - Normal   TROPONIN (IN-HOUSE) - Normal    Narrative:     Troponin T Reference Range:  <= 0.03 ng/mL-   Negative for AMI  >0.03 ng/mL-     Abnormal for myocardial necrosis.  Clinicians would have to utilize clinical acumen, EKG, Troponin and serial changes to determine if it is an Acute Myocardial Infarction or myocardial injury due to an underlying chronic condition.       Results may be falsely decreased if patient taking Biotin.     BNP (IN-HOUSE) - Normal    Narrative:     Among patients with dyspnea, NT-proBNP is highly sensitive for the detection of acute congestive heart failure. In addition NT-proBNP of <300 pg/ml effectively rules out acute congestive heart failure with 99% negative predictive value.    Results may be falsely  decreased if patient taking Biotin.     TSH - Normal   MAGNESIUM - Normal   COMPREHENSIVE METABOLIC PANEL    Narrative:     GFR Normal >60  Chronic Kidney Disease <60  Kidney Failure <15     RAINBOW DRAW    Narrative:     The following orders were created for panel order Winter Draw.  Procedure                               Abnormality         Status                     ---------                               -----------         ------                     Red Top[[id number]]                                          Final result                 Please view results for these tests on the individual orders.   CBC AND DIFFERENTIAL    Narrative:     The following orders were created for panel order CBC & Differential.  Procedure                               Abnormality         Status                     ---------                               -----------         ------                     CBC Auto Differential[[id number]]        Abnormal            Final result                 Please view results for these tests on the individual orders.   RED TOP                                     MDM  Number of Diagnoses or Management Options  Diagnosis management comments: No abdominal tenderness patient work-up here is unrevealing, the patient does have some epigastric pain worse with eating this could be peptic ulcer disease will start her on Zantac as she is allergic to PPIs according to her history       Amount and/or Complexity of Data Reviewed  Clinical lab tests: reviewed and ordered  Tests in the radiology section of CPT®: ordered and reviewed  Tests in the medicine section of CPT®: ordered and reviewed    Risk of Complications, Morbidity, and/or Mortality  Presenting problems: moderate  Diagnostic procedures: moderate  Management options: moderate    Patient Progress  Patient progress: stable      Final diagnoses:   Epigastric pain            Fox Clinton PA-C  03/06/20 0250

## 2020-03-06 ENCOUNTER — OFFICE VISIT (OUTPATIENT)
Dept: INTERNAL MEDICINE | Age: 55
End: 2020-03-06
Payer: COMMERCIAL

## 2020-03-06 VITALS
OXYGEN SATURATION: 99 % | BODY MASS INDEX: 26.94 KG/M2 | DIASTOLIC BLOOD PRESSURE: 70 MMHG | RESPIRATION RATE: 20 BRPM | WEIGHT: 172 LBS | SYSTOLIC BLOOD PRESSURE: 112 MMHG | HEART RATE: 96 BPM

## 2020-03-06 PROCEDURE — 99214 OFFICE O/P EST MOD 30 MIN: CPT | Performed by: INTERNAL MEDICINE

## 2020-03-06 ASSESSMENT — ENCOUNTER SYMPTOMS
COUGH: 0
CHEST TIGHTNESS: 1
WHEEZING: 0
SHORTNESS OF BREATH: 1
ABDOMINAL PAIN: 0
CONSTIPATION: 0
SORE THROAT: 0

## 2020-03-06 ASSESSMENT — PATIENT HEALTH QUESTIONNAIRE - PHQ9
SUM OF ALL RESPONSES TO PHQ QUESTIONS 1-9: 0
2. FEELING DOWN, DEPRESSED OR HOPELESS: 0
SUM OF ALL RESPONSES TO PHQ QUESTIONS 1-9: 0
SUM OF ALL RESPONSES TO PHQ9 QUESTIONS 1 & 2: 0
1. LITTLE INTEREST OR PLEASURE IN DOING THINGS: 0

## 2020-03-06 NOTE — PROGRESS NOTES
gabapentin (NEURONTIN) 300 MG capsule Take 2 caps by mouth three daily. (Patient taking differently: 3 times daily. Take 2 caps by mouth three daily.) 540 capsule 4    Omega 3-6-9 CAPS Take by mouth      b complex vitamins capsule Take 1 capsule by mouth daily      Multiple Vitamin (MULTI VITAMIN DAILY PO) Take by mouth      methocarbamol (ROBAXIN) 500 MG tablet Take 1 tablet by mouth 3 times daily 90 tablet 5    sucralfate (CARAFATE) 1 GM tablet DISSOLVE 1 TABLET INTO SLURRY THREE TIMES DAILY BEFORE MEALS  4    triamcinolone (NASACORT ALLERGY 24HR) 55 MCG/ACT nasal inhaler 2 sprays by Nasal route daily      ferrous sulfate 325 (65 Fe) MG tablet ferrous sulfate 325 mg (65 mg iron) tablet   1 TABLET DAILY. TAKE ON EMPTY STOMACH WITH OJ OR VITAMIN C TAB. No current facility-administered medications for this visit. Allergies   Allergen Reactions    Doxepin Itching    Augmentin [Amoxicillin-Pot Clavulanate]     Bactrim [Sulfamethoxazole-Trimethoprim]     Factive [Gemifloxacin]     Gluten Meal     Glutethimides     Proton Pump Inhibitors      Social History     Tobacco Use    Smoking status: Never Smoker    Smokeless tobacco: Never Used   Substance Use Topics    Alcohol use: No      Family History   Problem Relation Age of Onset    Crohn's Disease Son        Review of Systems   Constitutional: Positive for fatigue. Negative for chills and fever. HENT: Negative for congestion, ear pain, nosebleeds, postnasal drip and sore throat. Respiratory: Positive for chest tightness and shortness of breath. Negative for cough and wheezing. Cardiovascular: Positive for chest pain. Negative for palpitations and leg swelling. Gastrointestinal: Negative for abdominal pain and constipation. Genitourinary: Negative for dysuria and urgency. Musculoskeletal: Negative. Negative for arthralgias. Skin: Negative for rash. Neurological: Positive for weakness and numbness.  Negative for dizziness and headaches. Psychiatric/Behavioral: Negative. Vitals:    03/06/20 1205   BP: 112/70   Pulse: 96   Resp: 20   SpO2: 99%   Weight: 172 lb (78 kg)     Body mass index is 26.94 kg/m². Physical Exam  Constitutional:       Appearance: She is well-developed. HENT:      Right Ear: External ear normal.      Left Ear: External ear normal.      Mouth/Throat:      Pharynx: No oropharyngeal exudate. Eyes:      Conjunctiva/sclera: Conjunctivae normal.      Pupils: Pupils are equal, round, and reactive to light. Neck:      Musculoskeletal: Neck supple. Thyroid: No thyromegaly. Vascular: No JVD. Cardiovascular:      Rate and Rhythm: Normal rate. Heart sounds: Normal heart sounds. No murmur. Pulmonary:      Effort: No respiratory distress. Breath sounds: Normal breath sounds. No wheezing or rales. Chest:      Chest wall: No tenderness. Abdominal:      General: Bowel sounds are normal.      Palpations: Abdomen is soft. Lymphadenopathy:      Cervical: No cervical adenopathy. Skin:     General: Skin is warm. Findings: No rash. Neurological:      Mental Status: She is oriented to person, place, and time. Lab Review   No visits with results within 2 Month(s) from this visit. Latest known visit with results is:   Orders Only on 10/21/2019   Component Date Value    Testosterone, Free 10/21/2019 1.7     Estrogen Total 10/21/2019 40     Progesterone 10/21/2019 0.11            ASSESSMENT/PLAN:  Chest pressure    Exertional dyspnea      Other fatigue    [facility], [place], operated by [facility] record review  Impression:    No acute cardiopulmonary disease.     This report was finalized on 03/05/2020 17:04 by Dr. Leona Huerta MD.   Result Narrative   Exam:   XR CHEST 1 VW-       Component Name  6.75   5.38 (H)   15.0   44.8   83.3   27.9   33.5   13.0   39.0   9.6   255       3/5/2020     <0.010   31.6   Troponin T   proBNP   CMP normal  Thyroid level normal  Lipase and magnesium normal  PT PTT normal      Recommendations today  Reviewed electrocardiogram that was done this week  Nonspecific ST-T changes      Obtain:      -     Echocardiogram stress test; Future          Orders Placed This Encounter   Procedures    Echocardiogram stress test     New Prescriptions    No medications on file         No follow-ups on file. There are no Patient Instructions on file for this visit. EMR Dragon/transcription disclaimer:Significant part of this  encounter note is electronic transcription/translationof spoken language to printed text. The electronic translation of spoken language may be erroneous, or at times, nonsensical words or phrases may be inadvertently transcribed.  Although I have reviewed the note for sucherrors, some may still exist.

## 2020-03-16 ENCOUNTER — TELEPHONE (OUTPATIENT)
Dept: INTERNAL MEDICINE | Age: 55
End: 2020-03-16

## 2020-03-16 NOTE — TELEPHONE ENCOUNTER
[employer] has called stating that the insurance is not authorizing her ECHO states that it is not meeting criteria. Pt is scheduled for 03/19. What do you want to do?

## 2020-04-21 ENCOUNTER — TELEMEDICINE (OUTPATIENT)
Dept: INTERNAL MEDICINE | Age: 55
End: 2020-04-21
Payer: COMMERCIAL

## 2020-04-21 VITALS — SYSTOLIC BLOOD PRESSURE: 130 MMHG | DIASTOLIC BLOOD PRESSURE: 86 MMHG

## 2020-04-21 PROCEDURE — 99214 OFFICE O/P EST MOD 30 MIN: CPT | Performed by: INTERNAL MEDICINE

## 2020-04-21 ASSESSMENT — ENCOUNTER SYMPTOMS
CHEST TIGHTNESS: 0
COUGH: 0
ABDOMINAL PAIN: 0
SORE THROAT: 0
SHORTNESS OF BREATH: 1
BACK PAIN: 1
CONSTIPATION: 0
WHEEZING: 0

## 2020-04-21 ASSESSMENT — PATIENT HEALTH QUESTIONNAIRE - PHQ9
SUM OF ALL RESPONSES TO PHQ QUESTIONS 1-9: 0
SUM OF ALL RESPONSES TO PHQ QUESTIONS 1-9: 0
2. FEELING DOWN, DEPRESSED OR HOPELESS: 0
1. LITTLE INTEREST OR PLEASURE IN DOING THINGS: 0
SUM OF ALL RESPONSES TO PHQ9 QUESTIONS 1 & 2: 0

## 2020-06-03 ENCOUNTER — HOSPITAL ENCOUNTER (OUTPATIENT)
Dept: WOMENS IMAGING | Age: 55
Discharge: HOME | End: 2020-06-03
Payer: COMMERCIAL

## 2020-06-03 PROCEDURE — 77063 BREAST TOMOSYNTHESIS BI: CPT

## 2020-06-05 ENCOUNTER — LAB (OUTPATIENT)
Dept: LAB | Facility: HOSPITAL | Age: 55
End: 2020-06-05

## 2020-06-05 ENCOUNTER — TELEPHONE (OUTPATIENT)
Dept: CARDIOLOGY | Facility: CLINIC | Age: 55
End: 2020-06-05

## 2020-06-05 ENCOUNTER — OFFICE VISIT (OUTPATIENT)
Dept: CARDIOLOGY | Facility: CLINIC | Age: 55
End: 2020-06-05

## 2020-06-05 VITALS
HEIGHT: 67 IN | WEIGHT: 178 LBS | HEART RATE: 106 BPM | DIASTOLIC BLOOD PRESSURE: 80 MMHG | OXYGEN SATURATION: 98 % | SYSTOLIC BLOOD PRESSURE: 118 MMHG | BODY MASS INDEX: 27.94 KG/M2

## 2020-06-05 DIAGNOSIS — E66.3 OVERWEIGHT: ICD-10-CM

## 2020-06-05 DIAGNOSIS — R07.9 CHEST PAIN, UNSPECIFIED TYPE: Primary | ICD-10-CM

## 2020-06-05 DIAGNOSIS — R06.02 SHORTNESS OF BREATH: ICD-10-CM

## 2020-06-05 DIAGNOSIS — R94.31 ABNORMAL EKG: ICD-10-CM

## 2020-06-05 PROBLEM — G62.9 PERIPHERAL NEUROPATHY: Status: ACTIVE | Noted: 2020-06-05

## 2020-06-05 PROBLEM — K21.9 GERD (GASTROESOPHAGEAL REFLUX DISEASE): Status: ACTIVE | Noted: 2020-06-05

## 2020-06-05 LAB — NT-PROBNP SERPL-MCNC: 28.9 PG/ML (ref 5–900)

## 2020-06-05 PROCEDURE — 99204 OFFICE O/P NEW MOD 45 MIN: CPT | Performed by: INTERNAL MEDICINE

## 2020-06-05 PROCEDURE — 93000 ELECTROCARDIOGRAM COMPLETE: CPT | Performed by: INTERNAL MEDICINE

## 2020-06-05 PROCEDURE — 83880 ASSAY OF NATRIURETIC PEPTIDE: CPT | Performed by: INTERNAL MEDICINE

## 2020-06-05 PROCEDURE — 36415 COLL VENOUS BLD VENIPUNCTURE: CPT | Performed by: INTERNAL MEDICINE

## 2020-06-05 RX ORDER — FERROUS SULFATE 325(65) MG
325 TABLET ORAL
COMMUNITY
End: 2021-05-07

## 2020-06-05 NOTE — TELEPHONE ENCOUNTER
----- Message from Micha Greer MD sent at 6/5/2020  1:52 PM CDT -----  Please let her know that her BNP is within normal limits.  This indicates a low risk of heart failure.

## 2020-06-05 NOTE — PROGRESS NOTES
Reason for Visit: Chest pain.    HPI:  Lizbeth Rivera is a 54 y.o. female is being seen for consultation today at the request of Shahida La MD for evaluation of chest pain.  She started wearing.  Since then she had a constant sharp left-sided chest pain under her left ribs.  She also notes pain in the back.  Pain worsens when she lays on her left side.  She also has pain in both of her arms.  She has periods of lightheadedness and feeling very tired.  She is reported more shortness of breath and dyspnea on exertion.  She feels like her functional capacity has declined.  She has stopped exercising.  She reports that her primary care provider tried to order a stress test but it was declined by her insurance company.  She also reports abdominal pain.  She wanted to get tested for possible pancreas problems.  She went to a walk-in clinic and ended up getting an EKG and reported it was abnormal and told her to go to the emergency room.    Previous Cardiac Testing and Procedures:  -Holter monitor (3/30/2015) rare atrial ectopic beats with a 6 beat run of nonsustained SVT, no significant ventricular arrhythmias, palpitations correlate with SVT  -Echo (4/1/2015) EF 50-55%, normal diastolic function, normal LA and RA size, normal RV size and function, mild TR  -EKG treadmill stress (5/11/2015) average functional capacity, negative for ischemia    Patient Active Problem List   Diagnosis   • Peripheral neuropathy   • GERD (gastroesophageal reflux disease)       Social History     Tobacco Use   • Smoking status: Never Smoker   • Smokeless tobacco: Never Used   Substance Use Topics   • Alcohol use: No     Frequency: Never   • Drug use: No       Family History   Problem Relation Age of Onset   • Diabetes Maternal Grandmother    • Diabetes Maternal Grandfather        The following portions of the patient's history were reviewed and updated as appropriate: allergies, current medications, past family history, past medical  history, past social history, past surgical history and problem list.      Current Outpatient Medications:   •  B Complex Vitamins (B COMPLEX 1 PO), Take  by mouth., Disp: , Rfl:   •  [employer] ALLERGY RELIEF-D12 5-120 MG per 12 hr tablet, TAKE ONE TWICE A DAY AS NEEDED, Disp: , Rfl: 2  •  cycloSPORINE (RESTASIS) 0.05 % ophthalmic emulsion, Apply  to eye(s) as directed by provider., Disp: , Rfl:   •  EPINEPHrine (EPIPEN 2-FOSTER) 0.3 MG/0.3ML solution auto-injector injection, EpiPen, Disp: , Rfl:   •  famotidine (PEPCID) 20 MG tablet, Take 20 mg by mouth 2 (Two) Times a Day., Disp: , Rfl:   •  ferrous sulfate 325 (65 FE) MG tablet, Take 325 mg by mouth Daily With Breakfast., Disp: , Rfl:   •  gabapentin (NEURONTIN) 600 MG tablet, 600 mg Every 8 (Eight) Hours., Disp: , Rfl:   •  hydrOXYzine (ATARAX) 25 MG tablet, 50 mg., Disp: , Rfl:   •  MAGNESIUM PO, 2 (Two) Times a Day., Disp: , Rfl:   •  MULTIPLE VITAMIN PO, Take  by mouth., Disp: , Rfl:   •  OMEGA 3-6-9 FATTY ACIDS PO, omega 3,6,9 combination no.7  1600mg bid, Disp: , Rfl:   •  Omega-3 Fatty Acids (FISH OIL) 1000 MG capsule capsule, Take  by mouth., Disp: , Rfl:   •  sucralfate (CARAFATE) 1 g tablet, TAKE 1 TABLET BY MOUTH ON AN EMPTY STOMACH THREE TIMES A DAY BEFORE MEALS ORALLY 30 DAY(S), Disp: , Rfl: 5  •  Triamcinolone Acetonide (NASACORT ALLERGY 24HR) 55 MCG/ACT nasal inhaler, into the nostril(s) as directed by provider., Disp: , Rfl:   •  vitamin B-12 (CYANOCOBALAMIN) 1000 MCG tablet, Take  by mouth Daily., Disp: , Rfl:   •  VITAMIN D, CHOLECALCIFEROL, PO, Take  by mouth., Disp: , Rfl:   •  ibuprofen (ADVIL,MOTRIN) 200 MG tablet, Take 200 mg by mouth., Disp: , Rfl:     Review of Systems   Constitution: Positive for weight gain. Negative for chills, fever and weight loss.   HENT: Negative for sore throat.    Eyes: Negative for blurred vision and visual disturbance.   Cardiovascular: Positive for chest pain, dyspnea on exertion and palpitations. Negative for leg  "swelling, paroxysmal nocturnal dyspnea and syncope.   Respiratory: Positive for shortness of breath. Negative for cough.    Endocrine: Negative for cold intolerance and polyuria.   Skin: Negative for itching and rash.   Musculoskeletal: Positive for back pain. Negative for joint swelling and myalgias.   Gastrointestinal: Positive for abdominal pain and heartburn. Negative for diarrhea and vomiting.   Genitourinary: Negative for dysuria and hematuria.   Neurological: Positive for dizziness. Negative for headaches and numbness.   Psychiatric/Behavioral: Negative for depression. The patient is not nervous/anxious.    Allergic/Immunologic: Negative for hives.       Objective   /80 (BP Location: Left arm, Patient Position: Sitting, Cuff Size: Adult)   Pulse 106   Ht 170.2 cm (67\")   Wt 80.7 kg (178 lb)   SpO2 98%   BMI 27.88 kg/m²   Physical Exam   Constitutional: She is oriented to person, place, and time. She appears well-developed and well-nourished.   HENT:   Head: Normocephalic and atraumatic.   Eyes: Pupils are equal, round, and reactive to light. Conjunctivae and EOM are normal.   Neck: Normal range of motion. Neck supple. No JVD present. No thyromegaly present.   Cardiovascular: Normal rate, regular rhythm and normal heart sounds.   No murmur heard.  Pulmonary/Chest: Effort normal and breath sounds normal. She has no wheezes. She has no rales.   Abdominal: Soft. Bowel sounds are normal. She exhibits no distension. There is no tenderness.   Musculoskeletal: Normal range of motion. She exhibits no edema.   Neurological: She is alert and oriented to person, place, and time. Coordination normal.   Skin: Skin is warm and dry. No rash noted.   Psychiatric: She has a normal mood and affect. Her behavior is normal.       ECG 12 Lead  Date/Time: 6/5/2020 9:01 AM  Performed by: Micha Greer MD  Authorized by: Micha Greer MD   Comparison: compared with previous ECG from 3/5/2020  Similar to previous " ECG  Rhythm: sinus tachycardia              ICD-10-CM ICD-9-CM   1. Chest pain, unspecified type R07.9 786.50   2. Abnormal EKG R94.31 794.31   3. Shortness of breath R06.02 786.05   4. Overweight E66.3 278.02         Assessment/Plan:  1.  Chest pain: Mostly atypical characteristics.  She does have significant dyspnea on exertion.  Will evaluate further with a stress echo.    2.  Abnormal EKG: Reported at a walk-in clinic.  EKG today demonstrates sinus tachycardia and appears similar to EKG done in March.    3.  Shortness of breath: Patient with significant shortness of breath and dyspnea on exertion.  Stress echo ordered as mentioned above.  We will also check a BNP.    4.  Overweight: Patient's Body mass index is 27.88 kg/m². BMI is above normal parameters. Recommendations include: exercise counseling and nutrition counseling.

## 2020-06-16 ENCOUNTER — HOSPITAL ENCOUNTER (OUTPATIENT)
Dept: CARDIOLOGY | Facility: HOSPITAL | Age: 55
Discharge: HOME | End: 2020-06-16
Admitting: INTERNAL MEDICINE

## 2020-06-16 VITALS
BODY MASS INDEX: 27.92 KG/M2 | WEIGHT: 177.91 LBS | SYSTOLIC BLOOD PRESSURE: 142 MMHG | HEIGHT: 67 IN | HEART RATE: 77 BPM | DIASTOLIC BLOOD PRESSURE: 86 MMHG

## 2020-06-16 LAB
BH CV STRESS BP STAGE 1: NORMAL
BH CV STRESS BP STAGE 2: NORMAL
BH CV STRESS DURATION MIN STAGE 1: 3
BH CV STRESS DURATION MIN STAGE 2: 3
BH CV STRESS DURATION SEC STAGE 1: 0
BH CV STRESS DURATION SEC STAGE 2: 0
BH CV STRESS GRADE STAGE 1: 10
BH CV STRESS GRADE STAGE 2: 12
BH CV STRESS HR STAGE 1: 147
BH CV STRESS HR STAGE 2: 166
BH CV STRESS METS STAGE 1: 5
BH CV STRESS METS STAGE 2: 7.5
BH CV STRESS PROTOCOL 1: NORMAL
BH CV STRESS RECOVERY BP: NORMAL MMHG
BH CV STRESS RECOVERY HR: 97 BPM
BH CV STRESS SPEED STAGE 1: 1.7
BH CV STRESS SPEED STAGE 2: 2.5
BH CV STRESS STAGE 1: 1
BH CV STRESS STAGE 2: 2
MAXIMAL PREDICTED HEART RATE: 168 BPM
PERCENT MAX PREDICTED HR: 98.81 %
STRESS BASELINE BP: NORMAL MMHG
STRESS BASELINE HR: 80 BPM
STRESS PERCENT HR: 116 %
STRESS POST ESTIMATED WORKLOAD: 7.5 METS
STRESS POST EXERCISE DUR MIN: 6 MIN
STRESS POST EXERCISE DUR SEC: 0 SEC
STRESS POST PEAK BP: NORMAL MMHG
STRESS POST PEAK HR: 166 BPM
STRESS TARGET HR: 143 BPM

## 2020-06-16 PROCEDURE — 93018 CV STRESS TEST I&R ONLY: CPT | Performed by: INTERNAL MEDICINE

## 2020-06-16 PROCEDURE — 93352 ADMIN ECG CONTRAST AGENT: CPT | Performed by: INTERNAL MEDICINE

## 2020-06-16 PROCEDURE — 93350 STRESS TTE ONLY: CPT | Performed by: INTERNAL MEDICINE

## 2020-06-16 PROCEDURE — 93017 CV STRESS TEST TRACING ONLY: CPT

## 2020-06-16 PROCEDURE — 93350 STRESS TTE ONLY: CPT

## 2020-06-16 PROCEDURE — 25010000002 PERFLUTREN 6.52 MG/ML SUSPENSION: Performed by: INTERNAL MEDICINE

## 2020-06-16 RX ORDER — FEXOFENADINE HCL AND PSEUDOEPHEDRINE HCI 180; 240 MG/1; MG/1
1 TABLET, EXTENDED RELEASE ORAL DAILY
COMMUNITY

## 2020-06-16 RX ADMIN — PERFLUTREN 8.48 MG: 6.52 INJECTION, SUSPENSION INTRAVENOUS at 10:00

## 2020-06-23 ENCOUNTER — TELEPHONE (OUTPATIENT)
Dept: CARDIOLOGY | Facility: CLINIC | Age: 55
End: 2020-06-23

## 2020-06-23 NOTE — TELEPHONE ENCOUNTER
----- Message from Micha Greer MD sent at 6/23/2020 10:47 AM CDT -----  Please let her know that the stress test was low risk and showed no evidence of blockages.

## 2020-10-19 ENCOUNTER — PATIENT MESSAGE (OUTPATIENT)
Dept: INTERNAL MEDICINE | Age: 55
End: 2020-10-19

## 2020-10-20 DIAGNOSIS — K90.0 CELIAC DISEASE: ICD-10-CM

## 2020-10-20 DIAGNOSIS — E55.9 VITAMIN D DEFICIENCY: ICD-10-CM

## 2020-10-20 DIAGNOSIS — E53.8 VITAMIN B12 DEFICIENCY: ICD-10-CM

## 2020-10-20 DIAGNOSIS — R53.83 OTHER FATIGUE: ICD-10-CM

## 2020-10-20 DIAGNOSIS — G60.9 IDIOPATHIC PERIPHERAL NEUROPATHY: ICD-10-CM

## 2020-10-20 LAB
ALBUMIN SERPL-MCNC: 4.6 G/DL (ref 3.5–5.2)
ALP BLD-CCNC: 90 U/L (ref 35–104)
ALT SERPL-CCNC: 19 U/L (ref 5–33)
ANION GAP SERPL CALCULATED.3IONS-SCNC: 11 MMOL/L (ref 7–19)
AST SERPL-CCNC: 23 U/L (ref 5–32)
BASOPHILS ABSOLUTE: 0 K/UL (ref 0–0.2)
BASOPHILS RELATIVE PERCENT: 0.3 % (ref 0–1)
BILIRUB SERPL-MCNC: 0.5 MG/DL (ref 0.2–1.2)
BILIRUBIN URINE: NEGATIVE
BLOOD, URINE: NEGATIVE
BUN BLDV-MCNC: 11 MG/DL (ref 6–20)
CALCIUM SERPL-MCNC: 9.8 MG/DL (ref 8.6–10)
CHLORIDE BLD-SCNC: 102 MMOL/L (ref 98–111)
CHOLESTEROL, TOTAL: 176 MG/DL (ref 160–199)
CLARITY: CLEAR
CO2: 28 MMOL/L (ref 22–29)
COLOR: YELLOW
CREAT SERPL-MCNC: 0.7 MG/DL (ref 0.5–0.9)
EOSINOPHILS ABSOLUTE: 0.2 K/UL (ref 0–0.6)
EOSINOPHILS RELATIVE PERCENT: 2.4 % (ref 0–5)
GFR AFRICAN AMERICAN: >59
GFR NON-AFRICAN AMERICAN: >60
GLUCOSE BLD-MCNC: 69 MG/DL (ref 74–109)
GLUCOSE URINE: NEGATIVE MG/DL
HBA1C MFR BLD: 5.5 % (ref 4–6)
HCT VFR BLD CALC: 49.1 % (ref 37–47)
HDLC SERPL-MCNC: 50 MG/DL (ref 65–121)
HEMOGLOBIN: 15.5 G/DL (ref 12–16)
IMMATURE GRANULOCYTES #: 0 K/UL
KETONES, URINE: NEGATIVE MG/DL
LDL CHOLESTEROL CALCULATED: 102 MG/DL
LEUKOCYTE ESTERASE, URINE: NEGATIVE
LYMPHOCYTES ABSOLUTE: 1.7 K/UL (ref 1.1–4.5)
LYMPHOCYTES RELATIVE PERCENT: 25.7 % (ref 20–40)
MCH RBC QN AUTO: 27.6 PG (ref 27–31)
MCHC RBC AUTO-ENTMCNC: 31.6 G/DL (ref 33–37)
MCV RBC AUTO: 87.5 FL (ref 81–99)
MONOCYTES ABSOLUTE: 0.4 K/UL (ref 0–0.9)
MONOCYTES RELATIVE PERCENT: 5.9 % (ref 0–10)
NEUTROPHILS ABSOLUTE: 4.4 K/UL (ref 1.5–7.5)
NEUTROPHILS RELATIVE PERCENT: 65.6 % (ref 50–65)
NITRITE, URINE: NEGATIVE
PDW BLD-RTO: 13.6 % (ref 11.5–14.5)
PH UA: 6.5 (ref 5–8)
PLATELET # BLD: 266 K/UL (ref 130–400)
PMV BLD AUTO: 10 FL (ref 9.4–12.3)
POTASSIUM SERPL-SCNC: 4.1 MMOL/L (ref 3.5–5)
PROTEIN UA: NEGATIVE MG/DL
RBC # BLD: 5.61 M/UL (ref 4.2–5.4)
SODIUM BLD-SCNC: 141 MMOL/L (ref 136–145)
SPECIFIC GRAVITY UA: 1.01 (ref 1–1.03)
T3 FREE: 2.9 PG/ML (ref 2–4.4)
T4 FREE: 1.06 NG/DL (ref 0.93–1.7)
TOTAL PROTEIN: 7.7 G/DL (ref 6.6–8.7)
TRIGL SERPL-MCNC: 118 MG/DL (ref 0–149)
TSH SERPL DL<=0.05 MIU/L-ACNC: 1.13 UIU/ML (ref 0.27–4.2)
UROBILINOGEN, URINE: 0.2 E.U./DL
VITAMIN B-12: 1187 PG/ML (ref 211–946)
VITAMIN D 25-HYDROXY: 39.1 NG/ML
WBC # BLD: 6.7 K/UL (ref 4.8–10.8)

## 2020-10-20 NOTE — TELEPHONE ENCOUNTER
From: Pradeep Donahue  To: Guerline Mendoza MD  Sent: 10/19/2020 7:46 PM CDT  Subject: Test Results Question    I am going to have ordered blood drawn tomorrow afternoon (the 20th) for a check up on the 21st. I was wondering if you could add T3 and Free 4? I'm not sure exactly why other than a nurse friend of the family noticed something about my fingernails that has changed and with some other symptoms I have, she thought I should ask you about it. Thank you.

## 2020-10-21 ENCOUNTER — OFFICE VISIT (OUTPATIENT)
Dept: INTERNAL MEDICINE | Age: 55
End: 2020-10-21
Payer: COMMERCIAL

## 2020-10-21 VITALS
WEIGHT: 177 LBS | SYSTOLIC BLOOD PRESSURE: 122 MMHG | OXYGEN SATURATION: 98 % | HEART RATE: 95 BPM | DIASTOLIC BLOOD PRESSURE: 80 MMHG | BODY MASS INDEX: 27.72 KG/M2

## 2020-10-21 PROCEDURE — 99396 PREV VISIT EST AGE 40-64: CPT | Performed by: INTERNAL MEDICINE

## 2020-10-21 RX ORDER — AZELASTINE 1 MG/ML
1 SPRAY, METERED NASAL 2 TIMES DAILY
COMMUNITY

## 2020-10-21 RX ORDER — CLOBETASOL PROPIONATE 0.05 G/ML
SPRAY TOPICAL 2 TIMES DAILY
COMMUNITY
End: 2021-10-21 | Stop reason: CLARIF

## 2020-10-21 RX ORDER — ALBUTEROL SULFATE 90 UG/1
2 AEROSOL, METERED RESPIRATORY (INHALATION) EVERY 6 HOURS PRN
COMMUNITY

## 2020-10-21 ASSESSMENT — ENCOUNTER SYMPTOMS
SORE THROAT: 0
WHEEZING: 0
EYE PAIN: 0
CONSTIPATION: 0
SINUS PRESSURE: 0
TROUBLE SWALLOWING: 0
COUGH: 0
VOMITING: 0
BLOOD IN STOOL: 0
ABDOMINAL PAIN: 0
DIARRHEA: 0
COLOR CHANGE: 0
CHEST TIGHTNESS: 0
EYE REDNESS: 0
VOICE CHANGE: 0
NAUSEA: 0

## 2020-10-21 ASSESSMENT — PATIENT HEALTH QUESTIONNAIRE - PHQ9
SUM OF ALL RESPONSES TO PHQ QUESTIONS 1-9: 0
SUM OF ALL RESPONSES TO PHQ QUESTIONS 1-9: 0
1. LITTLE INTEREST OR PLEASURE IN DOING THINGS: 0
2. FEELING DOWN, DEPRESSED OR HOPELESS: 0
SUM OF ALL RESPONSES TO PHQ9 QUESTIONS 1 & 2: 0
SUM OF ALL RESPONSES TO PHQ QUESTIONS 1-9: 0

## 2020-10-21 NOTE — PROGRESS NOTES
Chief Complaint:   Adrián Davis is a 48 y.o. female who presents forcomplete physical exam.    History of Present Illness:      Adrián Davis is a 48 y.o. female who presents todayfor wellness visit AND follow up on her chronic medical conditions as noted below. Vitamin B12 deficiency- taking b12 supplement     Vitamin D deficiency- vit d takes with mvi     Idiopathic peripheral neuropathy- pt has followed with dr Calero, neurology  And takes neurontin TID      Fibromyalgia- continues to have pain sx    Celiac disease- follows specialist    Seen dermatologist PA- was told she may  have? ? Scalp psoriasis  Clobetasol scalp application was prescribed  Patient also has seen some nail changes recently    Allergy issues  Has seen allergist  On astelazine + albuterol     Patient Active Problem List    Diagnosis Date Noted    Celiac disease 10/04/2017     2001 dx per biopsy      Vitamin D deficiency 09/22/2017    Vitamin B12 deficiency 09/22/2017    Fibromyalgia 09/22/2017    Slow transit constipation 09/22/2017    Paresthesia 09/14/2016    Weakness 09/14/2016    Pain in both lower legs 09/14/2016    Idiopathic peripheral neuropathy 09/14/2016       Past Medical History:   Diagnosis Date    Arthralgia     Bradford's esophagus     Celiac disease     Depression     Fibromyalgia     Neuropathy     Rash     Rhinitis     Trochanteric tendinitis        Past Surgical History:   Procedure Laterality Date    CHOLECYSTECTOMY      COLONOSCOPY      GALLBLADDER SURGERY      NASAL ENDOSCOPY      TONSILLECTOMY         Current Outpatient Medications   Medication Sig Dispense Refill    Clobetasol Propionate 0.05 % LIQD Apply topically 2 times daily      azelastine (ASTELIN) 0.1 % nasal spray 1 spray by Nasal route 2 times daily Use in each nostril as directed      albuterol sulfate HFA (VENTOLIN HFA) 108 (90 Base) MCG/ACT inhaler Inhale 2 puffs into the lungs every 6 hours as needed for Wheezing      ferrous sulfate 325 (65 Fe) MG tablet ferrous sulfate 325 mg (65 mg iron) tablet   1 TABLET DAILY. TAKE ON EMPTY STOMACH WITH OJ OR VITAMIN C TAB.  cycloSPORINE (RESTASIS) 0.05 % ophthalmic emulsion Restasis MultiDose 0.05 % eye drops      Fexofenadine-Pseudoephedrine (ALLEGRA-D 24 HOUR PO) Take by mouth      Biotin 10 MG tablet Take 10 mg by mouth daily      ketorolac (ACULAR) 0.5 % ophthalmic solution 1 drop 4 times daily      ibuprofen (ADVIL;MOTRIN) 200 MG tablet Take 200 mg by mouth as needed for Pain      hydrOXYzine (ATARAX) 50 MG tablet Take 50 mg by mouth nightly       Magnesium 100 MG TABS Take 100 mg by mouth 2 times daily      gabapentin (NEURONTIN) 300 MG capsule Take 2 caps by mouth three daily. (Patient taking differently: 3 times daily. Take 2 caps by mouth three daily.) 540 capsule 4    Omega 3-6-9 CAPS Take by mouth      b complex vitamins capsule Take 1 capsule by mouth daily      Multiple Vitamin (MULTI VITAMIN DAILY PO) Take by mouth      methocarbamol (ROBAXIN) 500 MG tablet Take 1 tablet by mouth 3 times daily 90 tablet 5    sucralfate (CARAFATE) 1 GM tablet DISSOLVE 1 TABLET INTO SLURRY THREE TIMES DAILY BEFORE MEALS  4    triamcinolone (NASACORT ALLERGY 24HR) 55 MCG/ACT nasal inhaler 2 sprays by Nasal route daily       No current facility-administered medications for this visit.       Allergies   Allergen Reactions    Doxepin Itching    Augmentin [Amoxicillin-Pot Clavulanate]     Bactrim [Sulfamethoxazole-Trimethoprim]     Factive [Gemifloxacin]     Gluten Meal     Glutethimides     Proton Pump Inhibitors        Social History     Socioeconomic History    Marital status:      Spouse name: None    Number of children: None    Years of education: None    Highest education level: None   Occupational History    None   Social Needs    Financial resource strain: None    Food insecurity     Worry: None     Inability: None    Transportation needs     Medical: None     Non-medical: None   Tobacco Use    Smoking status: Never Smoker    Smokeless tobacco: Never Used   Substance and Sexual Activity    Alcohol use: No    Drug use: No    Sexual activity: Yes   Lifestyle    Physical activity     Days per week: None     Minutes per session: None    Stress: None   Relationships    Social connections     Talks on phone: None     Gets together: None     Attends Hindu service: None     Active member of club or organization: None     Attends meetings of clubs or organizations: None     Relationship status: None    Intimate partner violence     Fear of current or ex partner: None     Emotionally abused: None     Physically abused: None     Forced sexual activity: None   Other Topics Concern    None   Social History Narrative    None     Family History   Problem Relation Age of Onset    Crohn's Disease Son           Past Surgical History:   Procedure Laterality Date    CHOLECYSTECTOMY      COLONOSCOPY      GALLBLADDER SURGERY      NASAL ENDOSCOPY      TONSILLECTOMY           Lab Review   Orders Only on 10/20/2020   Component Date Value    Vitamin B-12 10/20/2020 1187*    Vit D, 25-Hydroxy 10/20/2020 39.1     TSH 10/20/2020 1.130     Color, UA 10/20/2020 YELLOW     Clarity, UA 10/20/2020 Clear     Glucose, Ur 10/20/2020 Negative     Bilirubin Urine 10/20/2020 Negative     Ketones, Urine 10/20/2020 Negative     Specific Gravity, UA 10/20/2020 1.009     Blood, Urine 10/20/2020 Negative     pH, UA 10/20/2020 6.5     Protein, UA 10/20/2020 Negative     Urobilinogen, Urine 10/20/2020 0.2     Nitrite, Urine 10/20/2020 Negative     Leukocyte Esterase, Urine 10/20/2020 Negative     Cholesterol, Total 10/20/2020 176     Triglycerides 10/20/2020 118     HDL 10/20/2020 50*    LDL Calculated 10/20/2020 102     Hemoglobin A1C 10/20/2020 5.5     Sodium 10/20/2020 141     Potassium 10/20/2020 4.1     Chloride 10/20/2020 102     CO2 10/20/2020 28     Anion Gap 10/20/2020 11     Glucose 10/20/2020 69*    BUN 10/20/2020 11     CREATININE 10/20/2020 0.7     GFR Non- 10/20/2020 >60     GFR  10/20/2020 >59     Calcium 10/20/2020 9.8     Total Protein 10/20/2020 7.7     Alb 10/20/2020 4.6     Total Bilirubin 10/20/2020 0.5     Alkaline Phosphatase 10/20/2020 90     ALT 10/20/2020 19     AST 10/20/2020 23     WBC 10/20/2020 6.7     RBC 10/20/2020 5.61*    Hemoglobin 10/20/2020 15.5     Hematocrit 10/20/2020 49.1*    MCV 10/20/2020 87.5     MCH 10/20/2020 27.6     MCHC 10/20/2020 31.6*    RDW 10/20/2020 13.6     Platelets 06/16/7065 266     MPV 10/20/2020 10.0     Neutrophils % 10/20/2020 65.6*    Lymphocytes % 10/20/2020 25.7     Monocytes % 10/20/2020 5.9     Eosinophils % 10/20/2020 2.4     Basophils % 10/20/2020 0.3     Neutrophils Absolute 10/20/2020 4.4     Immature Granulocytes # 10/20/2020 0.0     Lymphocytes Absolute 10/20/2020 1.7     Monocytes Absolute 10/20/2020 0.40     Eosinophils Absolute 10/20/2020 0.20     Basophils Absolute 10/20/2020 0.00    Patient Message on 10/19/2020   Component Date Value    T4 Free 10/20/2020 1.06     T3, Free 10/20/2020 2.9          Review of Systems   Constitutional: Positive for fatigue. Negative for chills and fever. HENT: Negative for congestion, ear pain, postnasal drip, sinus pressure, sore throat, trouble swallowing and voice change. Eyes: Negative for pain, redness and visual disturbance. Respiratory: Negative for cough, chest tightness and wheezing. Cardiovascular: Negative for chest pain, palpitations and leg swelling. Gastrointestinal: Negative for abdominal pain, blood in stool, constipation, diarrhea, nausea and vomiting. Endocrine: Negative for polydipsia and polyuria. Genitourinary: Negative for dysuria, enuresis, flank pain, frequency and urgency. Musculoskeletal: Negative for arthralgias, gait problem and joint swelling.    Skin: Negative for color change and rash. Neurological: Negative for dizziness, tremors, syncope, facial asymmetry, speech difficulty, weakness, numbness and headaches. Psychiatric/Behavioral: Negative for agitation, behavioral problems, confusion, sleep disturbance and suicidal ideas. The patient is not nervous/anxious. Vitals:    10/21/20 1339 10/21/20 1507   BP: 122/80    Pulse: 115 95   SpO2: 98%    Weight: 177 lb (80.3 kg)       Wt Readings from Last 3 Encounters:   10/21/20 177 lb (80.3 kg)   03/06/20 172 lb (78 kg)   03/03/20 173 lb (78.5 kg)   Body mass index is 27.72 kg/m². BP Readings from Last 3 Encounters:   10/21/20 122/80   04/21/20 130/86   03/06/20 112/70       Physical Exam  Constitutional:       Appearance: She is well-developed. HENT:      Head: Normocephalic and atraumatic. Right Ear: External ear normal.      Left Ear: External ear normal.   Eyes:      General: No scleral icterus. Conjunctiva/sclera: Conjunctivae normal.      Pupils: Pupils are equal, round, and reactive to light. Neck:      Musculoskeletal: Normal range of motion and neck supple. Thyroid: No thyromegaly. Vascular: No JVD. Cardiovascular:      Rate and Rhythm: Normal rate and regular rhythm. Heart sounds: Normal heart sounds. No murmur. Pulmonary:      Effort: Pulmonary effort is normal. No respiratory distress. Breath sounds: Normal breath sounds. No wheezing. Chest:      Chest wall: No tenderness. Abdominal:      General: Bowel sounds are normal.      Palpations: Abdomen is soft. There is no mass. Tenderness: There is no abdominal tenderness. Musculoskeletal: Normal range of motion. General: No tenderness. Lymphadenopathy:      Cervical: No cervical adenopathy. Skin:     General: Skin is warm and dry. Findings: No erythema or rash. Neurological:      Mental Status: She is alert and oriented to person, place, and time.       Cranial Nerves: No cranial disclaimer:Significant part of this  encounter note is electronic transcription/translation of spoken language to printed text. The electronic translation of spoken language may beerroneous, or at times, nonsensical words or phrases may be inadvertently transcribed.  Although I have reviewed the note for such errors, some may still exist.

## 2021-02-25 ENCOUNTER — PATIENT MESSAGE (OUTPATIENT)
Dept: INTERNAL MEDICINE | Age: 56
End: 2021-02-25

## 2021-02-25 DIAGNOSIS — R59.9 SWOLLEN LYMPH NODES: Primary | ICD-10-CM

## 2021-02-25 NOTE — TELEPHONE ENCOUNTER
From: Lissett Arango  To: Fani Moya MD  Sent: 2/25/2021 11:37 AM CST  Subject: Non-Urgent Medical Question    I am continually having problems with my lymph nodes being tender and swollen. I mentioned it to you last appt. Lately it is getting worse and I am in a lot of pain. I may have an ear infection as well but can't tell if it is the glands around my ear or my ear hurting. My right side is worse. Spots at the edge & under my jaw, around my ear, around my clavicle and under my arm pit hurt but my ear is the worst. I am having worse than usual congestion and that has been happening off and on for the last several months. I get tested for covid each time but it is negative. I do not have a fever with it but I am run down. I had planned on trying to see Dr. Pablo Anna or Dr. Skye Scanlon to have lymph nodes checked unless you think I should see you sooner.

## 2021-03-18 ENCOUNTER — TRANSCRIBE ORDERS (OUTPATIENT)
Dept: LAB | Facility: HOSPITAL | Age: 56
End: 2021-03-18

## 2021-03-18 DIAGNOSIS — Z01.818 PRE-OP TESTING: Primary | ICD-10-CM

## 2021-03-19 DIAGNOSIS — R59.0 CERVICAL LYMPHADENOPATHY: Primary | ICD-10-CM

## 2021-03-22 NOTE — PROGRESS NOTES
YOB: 1965  Location: [facility]  Location Address: [address] [place] [address], [facility], [address] [place] [address]  Location Phone: [phone]    Chief Complaint   Patient presents with   • Swollen Glands     swollen lymph nodes       History of Present Illness  Lizbeth Rivera is a 55 y.o. female.  Lizbeth Rivera is here for evaluation of ENT complaints. The patient has had problems with lymphadenopathy   The symptoms are localized to the bilateral neck, right worse than left. The patient has had moderate symptoms. The symptoms have been present for the last 7 years There have been no identified factors that aggravate the symptoms. The symptoms are improved by Clindamycin. Patient has sore throat, nasal congestion, nasal postnasal drip, headache, sinus pressure, ear pressure and lymph nodes of the breast and axilla. Patient has severe allergies and neuropathy. She has had no lab work other than regular CBC or CT scan.     She has a history of celiac disease and eats gluten-free.  She is allergic to PPI and therefore has to be on H2 blockers.  She states the lymph nodes in her neck as large as they did several weeks ago and then they regress and this has been going on for years.    I have personally reviewed the information imported into the chart during this visit.      I have personally reviewed the review of systems.         Past Medical History:   Diagnosis Date   • Abnormal ECG    • Colin's esophagus    • Celiac disease    • Neuropathy    • Polyneuropathy        Past Surgical History:   Procedure Laterality Date   • CHOLECYSTECTOMY     • TONSILLECTOMY         Outpatient Medications Marked as Taking for the 3/23/21 encounter (Office Visit) with Christopher Hurd MD   Medication Sig Dispense Refill   • albuterol sulfate  (90 Base) MCG/ACT inhaler albuterol sulfate HFA 90 mcg/actuation aerosol inhaler   INHALE 2 PUFFS EVERY 4 6 HOURS AS NEEDED FOR COUGH,WHEEZE, SHORTNESS OF BREATH AND  CHEST TIGHTNESS     • azelastine (ASTELIN) 0.1 % nasal spray SPRAY 1 SPRAY INTO EACH NOSTRIL TWICE A DAY     • B Complex Vitamins (B COMPLEX 1 PO) Take  by mouth.     • clarithromycin (BIAXIN) 500 MG tablet      • clindamycin (CLEOCIN T) 1 % external solution clindamycin phosphate 1 % topical solution   APPLY ONCE DAILY TO FACE AFTER SHOWER     • COLLAGEN-BORON-HYALURONIC ACID PO Take  by mouth.     • [employer] ALLERGY RELIEF-D12 5-120 MG per 12 hr tablet TAKE ONE TWICE A DAY AS NEEDED  2   • cycloSPORINE (RESTASIS) 0.05 % ophthalmic emulsion Apply  to eye(s) as directed by provider.     • EPINEPHrine (EPIPEN 2-FOSTER) 0.3 MG/0.3ML solution auto-injector injection EpiPen     • famotidine (PEPCID) 20 MG tablet Take 20 mg by mouth 2 (Two) Times a Day.     • ferrous sulfate 325 (65 FE) MG tablet Take 325 mg by mouth Daily With Breakfast.     • fexofenadine-pseudoephedrine (ALLEGRA-D 24) 180-240 MG per 24 hr tablet Take 1 tablet by mouth Daily.     • gabapentin (NEURONTIN) 600 MG tablet 600 mg Every 8 (Eight) Hours. 600, 300, 600     • hydrOXYzine (ATARAX) 25 MG tablet 50 mg.     • ibuprofen (ADVIL,MOTRIN) 200 MG tablet Take 200 mg by mouth.     • MAGNESIUM PO 2 (Two) Times a Day.     • MULTIPLE VITAMIN PO Take  by mouth.     • OMEGA 3-6-9 FATTY ACIDS PO omega 3,6,9 combination no.7   1600mg bid     • Omega-3 Fatty Acids (FISH OIL) 1000 MG capsule capsule Take  by mouth.     • sucralfate (CARAFATE) 1 g tablet TAKE 1 TABLET BY MOUTH ON AN EMPTY STOMACH THREE TIMES A DAY BEFORE MEALS ORALLY 30 DAY(S)  5   • Triamcinolone Acetonide (NASACORT ALLERGY 24HR) 55 MCG/ACT nasal inhaler into the nostril(s) as directed by provider.     • vitamin B-12 (CYANOCOBALAMIN) 1000 MCG tablet Take  by mouth Daily.     • VITAMIN D, CHOLECALCIFEROL, PO Take  by mouth.         Amoxicillin-pot clavulanate, Doxepin, Sulfamethoxazole-trimethoprim, Gluten meal, Glutethimides, Gemifloxacin, Latex, and Proton pump inhibitors    Family History   Problem  Relation Age of Onset   • Diabetes Maternal Grandmother    • Diabetes Maternal Grandfather        Social History     Socioeconomic History   • Marital status:      Spouse name: Not on file   • Number of children: Not on file   • Years of education: Not on file   • Highest education level: Not on file   Tobacco Use   • Smoking status: Never Smoker   • Smokeless tobacco: Never Used   Vaping Use   • Vaping Use: Never used   Substance and Sexual Activity   • Alcohol use: No   • Drug use: No   • Sexual activity: Defer       Review of Systems   Constitutional: Negative.    HENT: Positive for congestion, postnasal drip, rhinorrhea, sinus pressure and sore throat.         Ear pressure   Eyes: Negative.    Respiratory: Negative.    Cardiovascular: Negative.    Gastrointestinal:        Reflux   Endocrine: Negative.    Genitourinary: Negative.    Musculoskeletal: Negative.    Skin: Negative.    Allergic/Immunologic: Positive for environmental allergies.   Neurological: Negative.    Hematological: Negative.    Psychiatric/Behavioral: Negative.        Vitals:    03/23/21 1531   BP: 140/92   Pulse: 120   Temp: 98.2 °F (36.8 °C)       Body mass index is 27.89 kg/m².    Objective     Physical Exam  CONSTITUTIONAL: well nourished, well-developed, alert, oriented, in no acute distress     COMMUNICATION AND VOICE: able to communicate normally, normal voice quality    HEAD: normocephalic, no lesions, atraumatic, no tenderness, no masses     FACE: appearance normal, no lesions, no tenderness, no deformities, facial motion symmetric    EYES: ocular motility normal, eyelids normal, orbits normal, no proptosis, conjunctiva normal , pupils equal, round     EARS:  Hearing: hearing to conversational voice intact bilaterally   External Ears: normal bilaterally, no lesions  TMs  AS-clear and intact   AD-clear and intact    NOSE:  External Nose: external nasal structure normal, no tenderness on palpation, no nasal discharge, no lesions,  no evidence of trauma, nostrils patent     ORAL:  Lips: upper and lower lips without lesion   OC/OP-clear    NECK:  Inspection and Palpation: neck appearance normal, no masses or tenderness.  No significant lymphadenopathy is appreciated by palpation bilaterally    CHEST/RESPIRATORY: normal respiratory effort     CARDIOVASCULAR: no cyanosis or edema     NEUROLOGICAL/PSYCHIATRIC: oriented to time, place and person, mood normal, affect appropriate, CN II-XII intact grossly    Assessment/Plan   Diagnoses and all orders for this visit:    1. Anterior cervical lymphadenopathy (Primary)  -     CT Soft Tissue Neck With & Without Contrast    2. Gastroesophageal reflux disease without esophagitis  -     CT Soft Tissue Neck With & Without Contrast    3. Celiac disease  -     CT Soft Tissue Neck With & Without Contrast      * Surgery not found *  Orders Placed This Encounter   Procedures   • CT Soft Tissue Neck With & Without Contrast     Return in about 6 weeks (around 5/4/2021).       Patient Instructions   Continue reflux precautions and Pepcid twice daily  Refer to GI medicine in [place] for upper and lower endoscopy which secondary to Covid she has delayed.  CT scan of the neck with contrast  Follow-up evaluation here in 4 to 6 weeks  Call or return for problems

## 2021-03-23 ENCOUNTER — OFFICE VISIT (OUTPATIENT)
Dept: OTOLARYNGOLOGY | Facility: CLINIC | Age: 56
End: 2021-03-23

## 2021-03-23 VITALS
HEIGHT: 67 IN | DIASTOLIC BLOOD PRESSURE: 92 MMHG | HEART RATE: 120 BPM | BODY MASS INDEX: 27.95 KG/M2 | TEMPERATURE: 98.2 F | WEIGHT: 178.1 LBS | SYSTOLIC BLOOD PRESSURE: 140 MMHG

## 2021-03-23 DIAGNOSIS — K90.0 CELIAC DISEASE: ICD-10-CM

## 2021-03-23 DIAGNOSIS — K21.9 GASTROESOPHAGEAL REFLUX DISEASE WITHOUT ESOPHAGITIS: ICD-10-CM

## 2021-03-23 DIAGNOSIS — R59.0 ANTERIOR CERVICAL LYMPHADENOPATHY: Primary | ICD-10-CM

## 2021-03-23 PROCEDURE — 99213 OFFICE O/P EST LOW 20 MIN: CPT | Performed by: OTOLARYNGOLOGY

## 2021-03-23 RX ORDER — CLARITHROMYCIN 500 MG/1
TABLET, COATED ORAL
COMMUNITY
Start: 2021-03-18 | End: 2021-05-04

## 2021-03-23 RX ORDER — CLINDAMYCIN PHOSPHATE 11.9 MG/ML
SOLUTION TOPICAL
COMMUNITY
End: 2022-10-28

## 2021-03-23 RX ORDER — AZELASTINE 1 MG/ML
SPRAY, METERED NASAL
COMMUNITY
Start: 2020-12-17 | End: 2022-10-28

## 2021-03-23 RX ORDER — ALBUTEROL SULFATE 90 UG/1
AEROSOL, METERED RESPIRATORY (INHALATION)
COMMUNITY
End: 2022-10-28

## 2021-03-25 ENCOUNTER — PATIENT MESSAGE (OUTPATIENT)
Dept: OTOLARYNGOLOGY | Facility: CLINIC | Age: 56
End: 2021-03-25

## 2021-03-31 ENCOUNTER — OFFICE VISIT (OUTPATIENT)
Dept: INTERNAL MEDICINE | Age: 56
End: 2021-03-31
Payer: COMMERCIAL

## 2021-03-31 VITALS
DIASTOLIC BLOOD PRESSURE: 88 MMHG | HEIGHT: 67 IN | SYSTOLIC BLOOD PRESSURE: 130 MMHG | BODY MASS INDEX: 28.56 KG/M2 | OXYGEN SATURATION: 98 % | WEIGHT: 182 LBS | RESPIRATION RATE: 18 BRPM | HEART RATE: 88 BPM

## 2021-03-31 DIAGNOSIS — R21 SKIN RASH: ICD-10-CM

## 2021-03-31 DIAGNOSIS — L27.0 DRUG-INDUCED SKIN RASH: ICD-10-CM

## 2021-03-31 DIAGNOSIS — R22.0 FACIAL SWELLING: ICD-10-CM

## 2021-03-31 DIAGNOSIS — R22.0 FACIAL SWELLING: Primary | ICD-10-CM

## 2021-03-31 DIAGNOSIS — R59.0 AXILLARY ADENOPATHY: ICD-10-CM

## 2021-03-31 DIAGNOSIS — Z88.1 DRUG ALLERGY, ANTIBIOTIC: ICD-10-CM

## 2021-03-31 LAB
ALBUMIN SERPL-MCNC: 4.5 G/DL (ref 3.5–5.2)
ALP BLD-CCNC: 90 U/L (ref 35–104)
ALT SERPL-CCNC: 17 U/L (ref 5–33)
ANION GAP SERPL CALCULATED.3IONS-SCNC: 10 MMOL/L (ref 7–19)
AST SERPL-CCNC: 24 U/L (ref 5–32)
BASOPHILS ABSOLUTE: 0 K/UL (ref 0–0.2)
BASOPHILS RELATIVE PERCENT: 0.2 % (ref 0–1)
BILIRUB SERPL-MCNC: 0.4 MG/DL (ref 0.2–1.2)
BUN BLDV-MCNC: 11 MG/DL (ref 6–20)
CALCIUM SERPL-MCNC: 9.4 MG/DL (ref 8.6–10)
CHLORIDE BLD-SCNC: 102 MMOL/L (ref 98–111)
CO2: 28 MMOL/L (ref 22–29)
CREAT SERPL-MCNC: 0.7 MG/DL (ref 0.5–0.9)
EOSINOPHILS ABSOLUTE: 0.2 K/UL (ref 0–0.6)
EOSINOPHILS RELATIVE PERCENT: 2.6 % (ref 0–5)
GFR AFRICAN AMERICAN: >59
GFR NON-AFRICAN AMERICAN: >60
GLUCOSE BLD-MCNC: 90 MG/DL (ref 74–109)
HCT VFR BLD CALC: 47.2 % (ref 37–47)
HEMOGLOBIN: 14.7 G/DL (ref 12–16)
IMMATURE GRANULOCYTES #: 0 K/UL
LYMPHOCYTES ABSOLUTE: 2 K/UL (ref 1.1–4.5)
LYMPHOCYTES RELATIVE PERCENT: 31.4 % (ref 20–40)
MCH RBC QN AUTO: 27.8 PG (ref 27–31)
MCHC RBC AUTO-ENTMCNC: 31.1 G/DL (ref 33–37)
MCV RBC AUTO: 89.4 FL (ref 81–99)
MONOCYTES ABSOLUTE: 0.4 K/UL (ref 0–0.9)
MONOCYTES RELATIVE PERCENT: 5.9 % (ref 0–10)
NEUTROPHILS ABSOLUTE: 3.7 K/UL (ref 1.5–7.5)
NEUTROPHILS RELATIVE PERCENT: 59.7 % (ref 50–65)
PDW BLD-RTO: 13.4 % (ref 11.5–14.5)
PLATELET # BLD: 253 K/UL (ref 130–400)
PMV BLD AUTO: 9.9 FL (ref 9.4–12.3)
POTASSIUM SERPL-SCNC: 3.8 MMOL/L (ref 3.5–5)
RBC # BLD: 5.28 M/UL (ref 4.2–5.4)
SEDIMENTATION RATE, ERYTHROCYTE: 8 MM/HR (ref 0–25)
SODIUM BLD-SCNC: 140 MMOL/L (ref 136–145)
TOTAL PROTEIN: 7.4 G/DL (ref 6.6–8.7)
TSH SERPL DL<=0.05 MIU/L-ACNC: 1.18 UIU/ML (ref 0.27–4.2)
WBC # BLD: 6.2 K/UL (ref 4.8–10.8)

## 2021-03-31 PROCEDURE — 99214 OFFICE O/P EST MOD 30 MIN: CPT | Performed by: INTERNAL MEDICINE

## 2021-03-31 RX ORDER — FAMOTIDINE 20 MG/1
20 TABLET, FILM COATED ORAL 2 TIMES DAILY
COMMUNITY

## 2021-03-31 ASSESSMENT — ENCOUNTER SYMPTOMS
SORE THROAT: 0
COUGH: 0
ABDOMINAL PAIN: 0
WHEEZING: 0
CHEST TIGHTNESS: 0
CONSTIPATION: 0

## 2021-03-31 ASSESSMENT — PATIENT HEALTH QUESTIONNAIRE - PHQ9
2. FEELING DOWN, DEPRESSED OR HOPELESS: 0
1. LITTLE INTEREST OR PLEASURE IN DOING THINGS: 0
SUM OF ALL RESPONSES TO PHQ QUESTIONS 1-9: 0
SUM OF ALL RESPONSES TO PHQ9 QUESTIONS 1 & 2: 0

## 2021-03-31 NOTE — PROGRESS NOTES
Chief Complaint   Patient presents with    Rash     swollen lymph nodes, at the beginning of the month she went to a walk in clinic, states that we told her to see an ENT, they could not get her in for 4 weeks, so she went to a walk in clinic and was put on a zpack and had some fluid in her ears. But states that she has had swelling under her arms, on her chest. Pt states that she called her allergist and asked if that could cause these reactions, and they said that they didnt think so but put her on a stronger antibiotic     History of presenting illness:  Nael Bruce is a53 y.o. female who presents today for follow up on her chronic medical conditions as noted below.     Facial swelling /rash episodes that have been happening over last week since 3/22/21    History:    Seen at [facility] and was placed on zpak + steroid ( dx earache)  Was some better but then felt that her lymph nodes were more swollen and  had telehealth appt with her  allergist PA  3/18- was placed on clarithormycin for [facility] lymph nodes \"  Then developed rash 3/22 and stopped this antibiotic   Seen ENT Dr. Davi Sands on 3/23/2021, this was scheduled appointment  I have reviewed Dr. Nelson Hall note  Per Dr. Nelson Hall note on that day he did not notice any enlarged lymph nodes head neck area on exam  He did order a CT of neck to further evaluate  Per patient since that appointment rash has progressively got worse  States her face is significantly swollen in the morning and then improves by afternoon  Has significant associated itching        Patient Active Problem List    Diagnosis Date Noted    Celiac disease 10/04/2017     Overview Note:     2001 dx per biopsy      Vitamin D deficiency 09/22/2017    Vitamin B12 deficiency 09/22/2017    Fibromyalgia 09/22/2017    Slow transit constipation 09/22/2017    Paresthesia 09/14/2016    Weakness 09/14/2016    Pain in both lower legs 09/14/2016    Idiopathic peripheral neuropathy 09/14/2016 Past Medical History:   Diagnosis Date    Arthralgia     Bradford's esophagus     Celiac disease     Depression     Fibromyalgia     Neuropathy     Rash     Rhinitis     Trochanteric tendinitis       Past Surgical History:   Procedure Laterality Date    CHOLECYSTECTOMY      COLONOSCOPY      GALLBLADDER SURGERY      NASAL ENDOSCOPY      TONSILLECTOMY       Current Outpatient Medications   Medication Sig Dispense Refill    famotidine (PEPCID) 20 MG tablet Take 20 mg by mouth 2 times daily      COLLAGEN PO Take 1,000 mg by mouth daily      Clobetasol Propionate 0.05 % LIQD Apply topically 2 times daily      azelastine (ASTELIN) 0.1 % nasal spray 1 spray by Nasal route 2 times daily Use in each nostril as directed      albuterol sulfate HFA (VENTOLIN HFA) 108 (90 Base) MCG/ACT inhaler Inhale 2 puffs into the lungs every 6 hours as needed for Wheezing      Fexofenadine-Pseudoephedrine (ALLEGRA-D 24 HOUR PO) Take by mouth      Biotin 10 MG tablet Take 10 mg by mouth daily      ibuprofen (ADVIL;MOTRIN) 200 MG tablet Take 200 mg by mouth as needed for Pain      hydrOXYzine (ATARAX) 50 MG tablet Take 50 mg by mouth nightly       Magnesium 100 MG TABS Take 100 mg by mouth 2 times daily      gabapentin (NEURONTIN) 300 MG capsule Take 2 caps by mouth three daily. (Patient taking differently: 3 times daily. Take 2 caps by mouth three daily.) 540 capsule 4    Omega 3-6-9 CAPS Take by mouth      b complex vitamins capsule Take 1 capsule by mouth daily      Multiple Vitamin (MULTI VITAMIN DAILY PO) Take by mouth      sucralfate (CARAFATE) 1 GM tablet DISSOLVE 1 TABLET INTO SLURRY THREE TIMES DAILY BEFORE MEALS  4    triamcinolone (NASACORT ALLERGY 24HR) 55 MCG/ACT nasal inhaler 2 sprays by Nasal route daily       No current facility-administered medications for this visit.       Allergies   Allergen Reactions    Doxepin Itching    Augmentin [Amoxicillin-Pot Clavulanate]     Bactrim [Sulfamethoxazole-Trimethoprim]     Factive [Gemifloxacin]     Gluten Meal     Glutethimides     Proton Pump Inhibitors      Social History     Tobacco Use    Smoking status: Never Smoker    Smokeless tobacco: Never Used   Substance Use Topics    Alcohol use: No      Family History   Problem Relation Age of Onset    Crohn's Disease Son        Review of Systems   Constitutional: Positive for fatigue. Negative for chills and fever. HENT: Negative for congestion, ear pain, nosebleeds, postnasal drip and sore throat. Respiratory: Negative for cough, chest tightness and wheezing. Cardiovascular: Negative for chest pain, palpitations and leg swelling. Gastrointestinal: Negative for abdominal pain and constipation. Genitourinary: Negative for dysuria and urgency. Musculoskeletal: Positive for arthralgias and myalgias. Skin: Negative for rash. Neurological: Negative for dizziness and headaches. Psychiatric/Behavioral: Negative. Vitals:    03/31/21 1610   BP: 130/88   Site: Left Upper Arm   Position: Sitting   Cuff Size: Large Adult   Pulse: 88   Resp: 18   SpO2: 98%   Weight: 182 lb (82.6 kg)   Height: 5' 7\" (1.702 m)     Body mass index is 28.51 kg/m². Physical Exam  Constitutional:       Appearance: She is well-developed. HENT:      Head:      Comments: On exam no palpable significant lymphadenopathy today  No axillary lymhadenopathy on  exam     Right Ear: External ear normal.      Left Ear: External ear normal.      Mouth/Throat:      Pharynx: No oropharyngeal exudate. Eyes:      Conjunctiva/sclera: Conjunctivae normal.      Pupils: Pupils are equal, round, and reactive to light. Neck:      Musculoskeletal: Neck supple. Thyroid: No thyromegaly. Vascular: No JVD. Cardiovascular:      Rate and Rhythm: Normal rate. Heart sounds: Normal heart sounds. No murmur. Pulmonary:      Effort: No respiratory distress. Breath sounds: Normal breath sounds.  No wheezing or rales. Chest:      Chest wall: No tenderness. Abdominal:      General: Bowel sounds are normal.      Palpations: Abdomen is soft. Lymphadenopathy:      Cervical: No cervical adenopathy. Skin:     Findings: No rash. Comments: Irritated erythematous skin bilateral cheeks, periorbital areas/frontal skin  No significant swelling at this time present  No obvious skin lesions are present   Neurological:      Mental Status: She is oriented to person, place, and time. Lab Review   No visits with results within 2 Month(s) from this visit.    Latest known visit with results is:   Orders Only on 10/20/2020   Component Date Value    Vitamin B-12 10/20/2020 1187*    Vit D, 25-Hydroxy 10/20/2020 39.1     TSH 10/20/2020 1.130     Color, UA 10/20/2020 YELLOW     Clarity, UA 10/20/2020 Clear     Glucose, Ur 10/20/2020 Negative     Bilirubin Urine 10/20/2020 Negative     Ketones, Urine 10/20/2020 Negative     Specific Gravity, UA 10/20/2020 1.009     Blood, Urine 10/20/2020 Negative     pH, UA 10/20/2020 6.5     Protein, UA 10/20/2020 Negative     Urobilinogen, Urine 10/20/2020 0.2     Nitrite, Urine 10/20/2020 Negative     Leukocyte Esterase, Urine 10/20/2020 Negative     Cholesterol, Total 10/20/2020 176     Triglycerides 10/20/2020 118     HDL 10/20/2020 50*    LDL Calculated 10/20/2020 102     Hemoglobin A1C 10/20/2020 5.5     Sodium 10/20/2020 141     Potassium 10/20/2020 4.1     Chloride 10/20/2020 102     CO2 10/20/2020 28     Anion Gap 10/20/2020 11     Glucose 10/20/2020 69*    BUN 10/20/2020 11     CREATININE 10/20/2020 0.7     GFR Non- 10/20/2020 >60     GFR  10/20/2020 >59     Calcium 10/20/2020 9.8     Total Protein 10/20/2020 7.7     Albumin 10/20/2020 4.6     Total Bilirubin 10/20/2020 0.5     Alkaline Phosphatase 10/20/2020 90     ALT 10/20/2020 19     AST 10/20/2020 23     WBC 10/20/2020 6.7     RBC 10/20/2020 5.61*    Hemoglobin 10/20/2020 15.5     Hematocrit 10/20/2020 49.1*    MCV 10/20/2020 87.5     MCH 10/20/2020 27.6     MCHC 10/20/2020 31.6*    RDW 10/20/2020 13.6     Platelets 13/97/3500 266     MPV 10/20/2020 10.0     Neutrophils % 10/20/2020 65.6*    Lymphocytes % 10/20/2020 25.7     Monocytes % 10/20/2020 5.9     Eosinophils % 10/20/2020 2.4     Basophils % 10/20/2020 0.3     Neutrophils Absolute 10/20/2020 4.4     Immature Granulocytes # 10/20/2020 0.0     Lymphocytes Absolute 10/20/2020 1.7     Monocytes Absolute 10/20/2020 0.40     Eosinophils Absolute 10/20/2020 0.20     Basophils Absolute 10/20/2020 0.00            ASSESSMENT/PLAN:    SKin rashes  Facial swelling  Possible?  Drug allergy, antibiotic-clarithromycin  Possible Drug-induced skin rash    Facial swelling /rash episodes that have been happening over last week since 3/22/21    History:    Seen at [facility] and was placed on zpak + steroid ( dx earache)  Was some better but then felt that her lymph nodes were more swollen and  had telehealth appt with her  allergist PA  3/18- was placed on clarithormycin for [facility] lymph nodes \"  Then developed rash 3/22 and stopped this antibiotic   Seen ENT Dr. Kavita Roberson on 3/23/2021, this was scheduled appointment  I have reviewed Dr. Yenni Suero note  Per Dr. Yenni Suero note on that day he did not notice any enlarged lymph nodes head neck area on exam  He did order a CT of neck to further evaluate  Per patient since that appointment rash has progressively got worse  States her face is significantly swollen in the morning and then improves by afternoon  Has significant associated itching      Patient has had subjective feelings of enlarged lymph nodes, submandibular/no sensation of enlarged axillary lymph nodes  On exam today I do not feel any enlarged lymph nodes, lymph nodes also were not palpated by Dr. Kavita Roberson ENT  Patient had CT of neck done 2 days ago, since it so late today I am unable to get results of CT scan from [facility] x-ray today, will review this tomorrow morning  Obtain additional lab test  Since this patient has ongoing issues will refer to [facility] allergy/immunology for evaluation and opinion  For now, recommend following  1. We will review CT results tomorrow and call patient with opinion  2. Obtain lab test today      -     CBC Auto Differential; Future  -     Comprehensive Metabolic Panel; Future  -     Sedimentation Rate; Future  -     RUSTAM Screen with Reflex; Future  -     TSH without Reflex; Future  3. Proceed with referral to allergy immunology  -     External Referral To Allergy  4. Discontinue Allegra  Take Zyrtec 10 mg twice daily  Take hydroxyzine 10 to 25 mg every 12 as needed for itching  Okay to use varicream/emollient for dry face  She can use also 0.5% hydrocortisone on itchy areas on face x5 days twice daily      Axillary adenopathy-subjective sensation of axillary fullness  Unable to feel any enlarged lymph nodes on my exam today but I could be mistaken  We will obtain bilateral mammogram for further evaluation    -     LUIS A DIGITAL DIAGNOSTIC W OR WO CAD BILATERAL; Future              Orders Placed This Encounter   Procedures    LUIS A DIGITAL DIAGNOSTIC W OR WO CAD BILATERAL    CBC Auto Differential    Comprehensive Metabolic Panel    Sedimentation Rate    RUSTAM Screen with Reflex    TSH without Reflex    External Referral To Allergy     New Prescriptions    No medications on file         No follow-ups on file. There are no Patient Instructions on file for this visit. EMR Dragon/transcription disclaimer:Significant part of this  encounter note is electronic transcription/translationof spoken language to printed text. The electronic translation of spoken language may be erroneous, or at times, nonsensical words or phrases may be inadvertently transcribed.  Although I have reviewed the note for sucherrors, some may still exist.

## 2021-04-01 ENCOUNTER — TELEPHONE (OUTPATIENT)
Dept: INTERNAL MEDICINE | Age: 56
End: 2021-04-01

## 2021-04-01 NOTE — TELEPHONE ENCOUNTER
Pt has been notified that the labs are normal, still waiting on RUSTAM to come back. We have faxed over a mammogram diagnostic order to [facility] due to the swelling in the Axillary also, we have referred the pt to [address] [place] [address]. Pt is to call Dr. Fouzia Geiger office to see what his recommendations are concerning the CT that she hd. Pt does have 1 swollen lymph node. Pt is to call the office back to let us know what Dr. Yevgeniy Rivers is in regards to this.  Pt has a 3 week follow up with Dr. Merry Fothergill scheduled as well

## 2021-04-03 LAB — ANA IGG, ELISA: DETECTED

## 2021-04-06 ENCOUNTER — TELEPHONE (OUTPATIENT)
Dept: OTOLARYNGOLOGY | Facility: CLINIC | Age: 56
End: 2021-04-06

## 2021-04-06 LAB
ANA PATTERN 2: ABNORMAL
ANA PATTERN: ABNORMAL
ANA TITER 2: ABNORMAL
ANA TITER: ABNORMAL
ANTINUCLEAR AB INTERPRETIVE COMMENT: ABNORMAL
ANTINUCLEAR ANTIBODY, HEP-2, IGG: DETECTED
DOUBLE STRANDED DNA AB, IGG: NORMAL

## 2021-04-06 NOTE — TELEPHONE ENCOUNTER
----- Message from Christopher Hurd MD sent at 4/6/2021  1:23 PM CDT -----  Just F/U-will prob need excision  ----- Message -----  From: Angie Bryant RN  Sent: 4/1/2021   3:52 PM CDT  To: Christopher Hurd MD    Do you want fna of this or just follow up?

## 2021-04-08 LAB
ANTI JO-1 IGG: 0 AU/ML (ref 0–40)
ENA TO RNP ANTIBODY: NORMAL
ENA TO SMITH (SM) ANTIBODY: 2 AU/ML (ref 0–40)
ENA TO SSB (LA) ANTIBODY: 0 AU/ML (ref 0–40)
SCLERODERMA (SCL-70) AB: 2 AU/ML (ref 0–40)
SSA 52 (RO) (ENA) AB, IGG: 8 AU/ML (ref 0–40)
SSA 60 (RO) (ENA) AB, IGG: 0 AU/ML (ref 0–40)

## 2021-04-12 ENCOUNTER — TELEPHONE (OUTPATIENT)
Dept: INTERNAL MEDICINE | Age: 56
End: 2021-04-12

## 2021-04-12 DIAGNOSIS — R76.8 ELEVATED ANTINUCLEAR ANTIBODY (ANA) LEVEL: Primary | ICD-10-CM

## 2021-04-12 DIAGNOSIS — R21 RASH: ICD-10-CM

## 2021-04-12 DIAGNOSIS — R76.8 ELEVATED ANTINUCLEAR ANTIBODY (ANA) LEVEL: ICD-10-CM

## 2021-04-12 NOTE — TELEPHONE ENCOUNTER
Please notify patient   Explained to her that we were gone for entire week of spring break and I am just reviewing this lab now   Her RUSTAM titers are elevated, however any specific antibodies on the screen negative   I would like her to come by and do additional test that is called antihistone antibodies to rule out drug induced lupus   Please tell her that she needs to make appointment to discuss all these results with her rheumatologist as well/encourage her to call as soon as possible and make appointment to see Dr. Eliezer Sellers   We would send these results to Dr. Eliezer Sellers once antihistamine lab results are back     Pt has been notified of this.      Please sign the pended labs needed and add the Diagnosis Code

## 2021-04-14 ENCOUNTER — PATIENT MESSAGE (OUTPATIENT)
Dept: INTERNAL MEDICINE | Age: 56
End: 2021-04-14

## 2021-04-15 LAB — HISTONE ANTIBODY IGG: 0.7 UNITS (ref 0–0.9)

## 2021-04-15 NOTE — TELEPHONE ENCOUNTER
From: Leoanrdo Valdovinos  To: Valerie Diez MD  Sent: 4/14/2021 9:37 PM CDT  Subject: Visit Follow-Up Question    Dr. Wilmar Boyle could not get me in until July so I made an appointment with a P.A. there Jenni Lopez). If it would be better for me to see Dr. Abner Rudd or someone in [place] or elsewhere, please let me know. Thanks.

## 2021-04-15 NOTE — TELEPHONE ENCOUNTER
Please talk with me  Dr. Ulises Newsome and his physician assistant work very closely together, therefore I feel it would be beneficial to keep appointment with physician who is already aware of her situation/previous labs etc.  Please make sure that we have sent labs to Dr. Ulises Newsome already

## 2021-04-22 ENCOUNTER — OFFICE VISIT (OUTPATIENT)
Dept: INTERNAL MEDICINE | Age: 56
End: 2021-04-22
Payer: COMMERCIAL

## 2021-04-22 VITALS
OXYGEN SATURATION: 98 % | DIASTOLIC BLOOD PRESSURE: 82 MMHG | HEIGHT: 67 IN | WEIGHT: 183 LBS | RESPIRATION RATE: 18 BRPM | SYSTOLIC BLOOD PRESSURE: 118 MMHG | BODY MASS INDEX: 28.72 KG/M2 | HEART RATE: 105 BPM

## 2021-04-22 DIAGNOSIS — R76.8 ELEVATED ANTINUCLEAR ANTIBODY (ANA) LEVEL: ICD-10-CM

## 2021-04-22 DIAGNOSIS — R59.0 LYMPHADENOPATHY, SUBMANDIBULAR: Primary | ICD-10-CM

## 2021-04-22 DIAGNOSIS — R59.0 ENLARGED LYMPH NODE IN NECK: ICD-10-CM

## 2021-04-22 DIAGNOSIS — K21.00 CHRONIC REFLUX ESOPHAGITIS: ICD-10-CM

## 2021-04-22 DIAGNOSIS — R21 SKIN RASH: ICD-10-CM

## 2021-04-22 PROCEDURE — 99214 OFFICE O/P EST MOD 30 MIN: CPT | Performed by: INTERNAL MEDICINE

## 2021-04-22 ASSESSMENT — ENCOUNTER SYMPTOMS
ABDOMINAL PAIN: 0
CONSTIPATION: 0
WHEEZING: 0
CHEST TIGHTNESS: 0
COUGH: 0
SORE THROAT: 0

## 2021-04-22 NOTE — PROGRESS NOTES
Chief Complaint   Patient presents with    Follow-up     3 week, Rash is gone, pain is stil there, seen allergist yesterday      History of presenting illness:  Unruly Kelsey is a53 y.o. female who presents today for follow up on her chronic medical conditions as noted below.     Present for FU    * rash on face resolved  Seen allergist  She will from now on avoid Clarithromycin    * RT lymph node on CT enlarged  Solitary lymph node enlarged RT sided anterior triangle 2.3 x 1.3    * has appt to see GI dr Glory Blanchard-   per ENT needs to RO GERD      Patient Active Problem List    Diagnosis Date Noted    Celiac disease 10/04/2017     Overview Note:     2001 dx per biopsy      Vitamin D deficiency 09/22/2017    Vitamin B12 deficiency 09/22/2017    Fibromyalgia 09/22/2017    Slow transit constipation 09/22/2017    Paresthesia 09/14/2016    Weakness 09/14/2016    Pain in both lower legs 09/14/2016    Idiopathic peripheral neuropathy 09/14/2016     Past Medical History:   Diagnosis Date    Arthralgia     Bradford's esophagus     Celiac disease     Depression     Fibromyalgia     Neuropathy     Rash     Rhinitis     Trochanteric tendinitis       Past Surgical History:   Procedure Laterality Date    CHOLECYSTECTOMY      COLONOSCOPY      GALLBLADDER SURGERY      NASAL ENDOSCOPY      TONSILLECTOMY       Current Outpatient Medications   Medication Sig Dispense Refill    famotidine (PEPCID) 20 MG tablet Take 20 mg by mouth 2 times daily      COLLAGEN PO Take 1,000 mg by mouth daily      Clobetasol Propionate 0.05 % LIQD Apply topically 2 times daily      azelastine (ASTELIN) 0.1 % nasal spray 1 spray by Nasal route 2 times daily Use in each nostril as directed      albuterol sulfate HFA (VENTOLIN HFA) 108 (90 Base) MCG/ACT inhaler Inhale 2 puffs into the lungs every 6 hours as needed for Wheezing      Fexofenadine-Pseudoephedrine (ALLEGRA-D 24 HOUR PO) Take by mouth      Biotin 10 MG tablet Take 10 mg by mouth daily      ibuprofen (ADVIL;MOTRIN) 200 MG tablet Take 200 mg by mouth as needed for Pain      hydrOXYzine (ATARAX) 50 MG tablet Take 50 mg by mouth nightly       Magnesium 100 MG TABS Take 100 mg by mouth 2 times daily      gabapentin (NEURONTIN) 300 MG capsule Take 2 caps by mouth three daily. (Patient taking differently: 3 times daily. Take 2 caps by mouth three daily.) 540 capsule 4    Omega 3-6-9 CAPS Take by mouth      b complex vitamins capsule Take 1 capsule by mouth daily      Multiple Vitamin (MULTI VITAMIN DAILY PO) Take by mouth      sucralfate (CARAFATE) 1 GM tablet DISSOLVE 1 TABLET INTO SLURRY THREE TIMES DAILY BEFORE MEALS  4    triamcinolone (NASACORT ALLERGY 24HR) 55 MCG/ACT nasal inhaler 2 sprays by Nasal route daily       No current facility-administered medications for this visit. Allergies   Allergen Reactions    Doxepin Itching    Augmentin [Amoxicillin-Pot Clavulanate]     Bactrim [Sulfamethoxazole-Trimethoprim]     Factive [Gemifloxacin]     Gluten Meal     Glutethimides     Proton Pump Inhibitors      Social History     Tobacco Use    Smoking status: Never Smoker    Smokeless tobacco: Never Used   Substance Use Topics    Alcohol use: No      Family History   Problem Relation Age of Onset    Crohn's Disease Son        Review of Systems   Constitutional: Positive for fatigue. Negative for chills and fever. HENT: Negative for congestion, ear pain, nosebleeds, postnasal drip and sore throat. Submandibular right-sided pain on palpation/no palpable mass   Respiratory: Negative for cough, chest tightness and wheezing. Cardiovascular: Negative for chest pain, palpitations and leg swelling. Gastrointestinal: Negative for abdominal pain and constipation. Genitourinary: Negative for dysuria and urgency. Musculoskeletal: Positive for arthralgias. Skin: Negative for rash. Neurological: Negative for dizziness and headaches. AST 03/31/2021 24     WBC 03/31/2021 6.2     RBC 03/31/2021 5.28     Hemoglobin 03/31/2021 14.7     Hematocrit 03/31/2021 47.2*    MCV 03/31/2021 89.4     MCH 03/31/2021 27.8     MCHC 03/31/2021 31.1*    RDW 03/31/2021 13.4     Platelets 23/35/8451 253     MPV 03/31/2021 9.9     Neutrophils % 03/31/2021 59.7     Lymphocytes % 03/31/2021 31.4     Monocytes % 03/31/2021 5.9     Eosinophils % 03/31/2021 2.6     Basophils % 03/31/2021 0.2     Neutrophils Absolute 03/31/2021 3.7     Immature Granulocytes # 03/31/2021 0.0     Lymphocytes Absolute 03/31/2021 2.0     Monocytes Absolute 03/31/2021 0.40     Eosinophils Absolute 03/31/2021 0.20     Basophils Absolute 03/31/2021 0.00     Antinuclear Antibody, He* 03/31/2021 Detected*    Antinuclear AB Interpret* 03/31/2021 See Note     RUSTAM Pattern 04/06/2021 Nuclear Dot*    RUSTAM TITER 04/06/2021 1:1280*    RUSTAM Pattern 2 04/06/2021 Homogeneous*    RUSTAM Titer 2 04/06/2021 1:320*    Double Stranded Dna Ab, * 03/31/2021 None Detected     Anti SHAWN-1 03/31/2021 0     Scleroderma SCL-70 03/31/2021 2     HAMILTON Roque (SM) Ab 03/31/2021 2     HAMILTON SSB (LA) Ab 03/31/2021 0     SSA 52 (RO) (HAMILTON) AB, IGG 03/31/2021 8     SSA 60 (RO) (HAMILTON) AB, IGG 03/31/2021 0     Anti-RNP 03/31/2021 See Note            ASSESSMENT/PLAN:    Lymphadenopathy, submandibular  Enlarged lymph node in neck  I have personally reviewed and interpreted these lab results and thoroughly discussed with patient  * RT lymph node on CT enlarged  Solitary lymph node enlarged RT sided anterior triangle 2.3 x 1.3    Recommendations  observe  Keep follow-up with ENT Dr. Ne Ascencio 5/4/2021  Follow-up CT as per ENT    Chronic reflux esophagitis  * has appt to see GI dr Vikram Mendoza-   per ENT needs to RO GERD  Patient states that the plan would be to proceed with EGD    Elevated antinuclear antibody (RUSTAM) level  I have personally reviewed and interpreted these lab results and thoroughly discussed with patient Elevated RUSTAM titer nucleolar pattern  Additional testing dsDNA,SHAWN-1 antibody, SCL 70 antibody, Smith antibody Sjogren's a and B, ribonucleic protein antibody and antihistone antibodies all negative/normal  Patient has appointment to discuss her symptoms with Dr. Mariluz Oseguera, has upcoming appointment in May 2021    Skin rash  * rash on face resolved  Seen allergist  She will from now on avoid Clarithromycin      No orders of the defined types were placed in this encounter. New Prescriptions    No medications on file         No follow-ups on file. There are no Patient Instructions on file for this visit. EMR Dragon/transcription disclaimer:Significant part of this  encounter note is electronic transcription/translationof spoken language to printed text. The electronic translation of spoken language may be erroneous, or at times, nonsensical words or phrases may be inadvertently transcribed.  Although I have reviewed the note for sucherrors, some may still exist.

## 2021-05-04 ENCOUNTER — OFFICE VISIT (OUTPATIENT)
Dept: OTOLARYNGOLOGY | Facility: CLINIC | Age: 56
End: 2021-05-04

## 2021-05-04 VITALS
SYSTOLIC BLOOD PRESSURE: 143 MMHG | WEIGHT: 176 LBS | TEMPERATURE: 98.2 F | HEART RATE: 97 BPM | HEIGHT: 67 IN | DIASTOLIC BLOOD PRESSURE: 89 MMHG | BODY MASS INDEX: 27.62 KG/M2

## 2021-05-04 DIAGNOSIS — K21.9 GASTROESOPHAGEAL REFLUX DISEASE WITHOUT ESOPHAGITIS: ICD-10-CM

## 2021-05-04 DIAGNOSIS — R59.1 LYMPHADENOPATHY OF HEAD AND NECK: Primary | ICD-10-CM

## 2021-05-04 DIAGNOSIS — Z87.19 HX OF PAROTITIS: ICD-10-CM

## 2021-05-04 PROCEDURE — 99214 OFFICE O/P EST MOD 30 MIN: CPT | Performed by: OTOLARYNGOLOGY

## 2021-05-04 RX ORDER — GABAPENTIN 300 MG/1
300 CAPSULE ORAL DAILY
COMMUNITY
Start: 2021-04-16

## 2021-05-04 RX ORDER — MAGNESIUM OXIDE 400 MG/1
100 TABLET ORAL DAILY
COMMUNITY
End: 2021-05-04

## 2021-05-04 NOTE — PATIENT INSTRUCTIONS
Excisional biopsy right level II cervical lymph node   LYMPH NODE BIOPSY: The risks, benefits, and alternatives of the procedure including but not limited to pain, numbness, nerve injury, scarring, bleeding, infection, persistent symptoms, and risks of the anesthesia were discussed full with the patient and questions were answered. No guarantees were made or implied.      Patient and family were instructed on the proper use of scheduled prescription drugs including their impact on driving and the potential effects during pregnancy.  The potential for overdose was discussed and their safe storage and proper disposal.  The website www.[place].gov which contains other materials in this regard.          Warm compresses with three to four times/day massage to affected side  Sialogogues - lemon three to four times/day  Take antibiotics as recommended  Drink plenty of fluids  Call for persistent or increased swelling, development of fever

## 2021-05-05 ENCOUNTER — TRANSCRIBE ORDERS (OUTPATIENT)
Dept: ADMINISTRATIVE | Facility: HOSPITAL | Age: 56
End: 2021-05-05

## 2021-05-05 DIAGNOSIS — Z11.59 SCREENING FOR VIRAL DISEASE: Primary | ICD-10-CM

## 2021-05-07 ENCOUNTER — PRE-ADMISSION TESTING (OUTPATIENT)
Dept: PREADMISSION TESTING | Facility: HOSPITAL | Age: 56
End: 2021-05-07

## 2021-05-07 ENCOUNTER — LAB (OUTPATIENT)
Dept: LAB | Facility: HOSPITAL | Age: 56
End: 2021-05-07

## 2021-05-07 ENCOUNTER — HOSPITAL ENCOUNTER (OUTPATIENT)
Dept: GENERAL RADIOLOGY | Facility: HOSPITAL | Age: 56
Discharge: HOME | End: 2021-05-07

## 2021-05-07 VITALS
HEIGHT: 67 IN | BODY MASS INDEX: 29.13 KG/M2 | SYSTOLIC BLOOD PRESSURE: 142 MMHG | OXYGEN SATURATION: 99 % | RESPIRATION RATE: 16 BRPM | HEART RATE: 94 BPM | WEIGHT: 185.6 LBS | DIASTOLIC BLOOD PRESSURE: 83 MMHG

## 2021-05-07 DIAGNOSIS — Z87.19 HX OF PAROTITIS: ICD-10-CM

## 2021-05-07 DIAGNOSIS — R59.0 CERVICAL LYMPHADENOPATHY: ICD-10-CM

## 2021-05-07 DIAGNOSIS — K21.9 GASTROESOPHAGEAL REFLUX DISEASE WITHOUT ESOPHAGITIS: ICD-10-CM

## 2021-05-07 DIAGNOSIS — R59.1 LYMPHADENOPATHY OF HEAD AND NECK: ICD-10-CM

## 2021-05-07 LAB
ALBUMIN SERPL-MCNC: 4.1 G/DL (ref 3.5–5.2)
ALBUMIN/GLOB SERPL: 1.3 G/DL
ALP SERPL-CCNC: 91 U/L (ref 39–117)
ALT SERPL W P-5'-P-CCNC: 16 U/L (ref 1–33)
ANION GAP SERPL CALCULATED.3IONS-SCNC: 6 MMOL/L (ref 5–15)
AST SERPL-CCNC: 26 U/L (ref 1–32)
BILIRUB SERPL-MCNC: 0.3 MG/DL (ref 0–1.2)
BUN SERPL-MCNC: 13 MG/DL (ref 6–20)
BUN/CREAT SERPL: 16.9 (ref 7–25)
CALCIUM SPEC-SCNC: 9.5 MG/DL (ref 8.6–10.5)
CHLORIDE SERPL-SCNC: 106 MMOL/L (ref 98–107)
CO2 SERPL-SCNC: 29 MMOL/L (ref 22–29)
CREAT SERPL-MCNC: 0.77 MG/DL (ref 0.57–1)
DEPRECATED RDW RBC AUTO: 41 FL (ref 37–54)
ERYTHROCYTE [DISTWIDTH] IN BLOOD BY AUTOMATED COUNT: 13.2 % (ref 12.3–15.4)
GFR SERPL CREATININE-BSD FRML MDRD: 78 ML/MIN/1.73
GLOBULIN UR ELPH-MCNC: 3.1 GM/DL
GLUCOSE SERPL-MCNC: 99 MG/DL (ref 65–99)
HCT VFR BLD AUTO: 44.7 % (ref 34–46.6)
HGB BLD-MCNC: 14.4 G/DL (ref 12–15.9)
MCH RBC QN AUTO: 27.7 PG (ref 26.6–33)
MCHC RBC AUTO-ENTMCNC: 32.2 G/DL (ref 31.5–35.7)
MCV RBC AUTO: 86.1 FL (ref 79–97)
PLATELET # BLD AUTO: 241 10*3/MM3 (ref 140–450)
PMV BLD AUTO: 9.5 FL (ref 6–12)
POTASSIUM SERPL-SCNC: 4.1 MMOL/L (ref 3.5–5.2)
PROT SERPL-MCNC: 7.2 G/DL (ref 6–8.5)
RBC # BLD AUTO: 5.19 10*6/MM3 (ref 3.77–5.28)
SARS-COV-2 ORF1AB RESP QL NAA+PROBE: NOT DETECTED
SODIUM SERPL-SCNC: 141 MMOL/L (ref 136–145)
WBC # BLD AUTO: 7 10*3/MM3 (ref 3.4–10.8)

## 2021-05-07 PROCEDURE — C9803 HOPD COVID-19 SPEC COLLECT: HCPCS

## 2021-05-07 PROCEDURE — 36415 COLL VENOUS BLD VENIPUNCTURE: CPT

## 2021-05-07 PROCEDURE — 93010 ELECTROCARDIOGRAM REPORT: CPT | Performed by: INTERNAL MEDICINE

## 2021-05-07 PROCEDURE — 71046 X-RAY EXAM CHEST 2 VIEWS: CPT

## 2021-05-07 PROCEDURE — U0004 COV-19 TEST NON-CDC HGH THRU: HCPCS | Performed by: OTOLARYNGOLOGY

## 2021-05-07 PROCEDURE — 85027 COMPLETE CBC AUTOMATED: CPT

## 2021-05-07 PROCEDURE — 93005 ELECTROCARDIOGRAM TRACING: CPT

## 2021-05-07 PROCEDURE — 80053 COMPREHEN METABOLIC PANEL: CPT

## 2021-05-08 LAB
QT INTERVAL: 370 MS
QTC INTERVAL: 429 MS

## 2021-05-10 ENCOUNTER — ANESTHESIA (OUTPATIENT)
Dept: PERIOP | Facility: HOSPITAL | Age: 56
End: 2021-05-10

## 2021-05-10 ENCOUNTER — HOSPITAL ENCOUNTER (OUTPATIENT)
Facility: HOSPITAL | Age: 56
Setting detail: HOSPITAL OUTPATIENT SURGERY
Discharge: HOME | End: 2021-05-10
Attending: OTOLARYNGOLOGY | Admitting: OTOLARYNGOLOGY

## 2021-05-10 ENCOUNTER — ANESTHESIA EVENT (OUTPATIENT)
Dept: PERIOP | Facility: HOSPITAL | Age: 56
End: 2021-05-10

## 2021-05-10 VITALS
HEART RATE: 58 BPM | SYSTOLIC BLOOD PRESSURE: 117 MMHG | TEMPERATURE: 97.5 F | RESPIRATION RATE: 16 BRPM | DIASTOLIC BLOOD PRESSURE: 71 MMHG | OXYGEN SATURATION: 99 %

## 2021-05-10 DIAGNOSIS — R59.1 LYMPHADENOPATHY OF HEAD AND NECK: Primary | ICD-10-CM

## 2021-05-10 DIAGNOSIS — K21.9 GASTROESOPHAGEAL REFLUX DISEASE WITHOUT ESOPHAGITIS: ICD-10-CM

## 2021-05-10 DIAGNOSIS — Z87.19 HX OF PAROTITIS: ICD-10-CM

## 2021-05-10 PROCEDURE — 25010000002 PROPOFOL 10 MG/ML EMULSION: Performed by: NURSE ANESTHETIST, CERTIFIED REGISTERED

## 2021-05-10 PROCEDURE — 25010000002 SUCCINYLCHOLINE PER 20 MG: Performed by: NURSE ANESTHETIST, CERTIFIED REGISTERED

## 2021-05-10 PROCEDURE — 38510 BIOPSY/REMOVAL LYMPH NODES: CPT | Performed by: OTOLARYNGOLOGY

## 2021-05-10 PROCEDURE — 25010000002 ONDANSETRON PER 1 MG: Performed by: NURSE ANESTHETIST, CERTIFIED REGISTERED

## 2021-05-10 PROCEDURE — 25010000002 DEXAMETHASONE PER 1 MG: Performed by: NURSE ANESTHETIST, CERTIFIED REGISTERED

## 2021-05-10 PROCEDURE — 88333 PATH CONSLTJ SURG CYTO XM 1: CPT | Performed by: OTOLARYNGOLOGY

## 2021-05-10 PROCEDURE — 25010000002 FENTANYL CITRATE (PF) 100 MCG/2ML SOLUTION: Performed by: NURSE ANESTHETIST, CERTIFIED REGISTERED

## 2021-05-10 PROCEDURE — 88305 TISSUE EXAM BY PATHOLOGIST: CPT | Performed by: OTOLARYNGOLOGY

## 2021-05-10 PROCEDURE — 25010000002 ONDANSETRON PER 1 MG: Performed by: ANESTHESIOLOGY

## 2021-05-10 PROCEDURE — 25010000002 PHENYLEPHRINE HCL 0.8 MG/10ML SOLUTION PREFILLED SYRINGE: Performed by: NURSE ANESTHETIST, CERTIFIED REGISTERED

## 2021-05-10 RX ORDER — SODIUM CHLORIDE 0.9 % (FLUSH) 0.9 %
3 SYRINGE (ML) INJECTION EVERY 12 HOURS SCHEDULED
Status: DISCONTINUED | OUTPATIENT
Start: 2021-05-10 | End: 2021-05-10 | Stop reason: HOSPADM

## 2021-05-10 RX ORDER — FENTANYL CITRATE 50 UG/ML
25 INJECTION, SOLUTION INTRAMUSCULAR; INTRAVENOUS
Status: DISCONTINUED | OUTPATIENT
Start: 2021-05-10 | End: 2021-05-10 | Stop reason: HOSPADM

## 2021-05-10 RX ORDER — ONDANSETRON 4 MG/1
4 TABLET, FILM COATED ORAL ONCE AS NEEDED
Status: DISCONTINUED | OUTPATIENT
Start: 2021-05-10 | End: 2021-05-10 | Stop reason: HOSPADM

## 2021-05-10 RX ORDER — LABETALOL HYDROCHLORIDE 5 MG/ML
5 INJECTION, SOLUTION INTRAVENOUS
Status: DISCONTINUED | OUTPATIENT
Start: 2021-05-10 | End: 2021-05-10 | Stop reason: HOSPADM

## 2021-05-10 RX ORDER — LIDOCAINE HYDROCHLORIDE 20 MG/ML
INJECTION, SOLUTION EPIDURAL; INFILTRATION; INTRACAUDAL; PERINEURAL AS NEEDED
Status: DISCONTINUED | OUTPATIENT
Start: 2021-05-10 | End: 2021-05-10 | Stop reason: SURG

## 2021-05-10 RX ORDER — SUCCINYLCHOLINE CHLORIDE 20 MG/ML
INJECTION INTRAMUSCULAR; INTRAVENOUS AS NEEDED
Status: DISCONTINUED | OUTPATIENT
Start: 2021-05-10 | End: 2021-05-10 | Stop reason: SURG

## 2021-05-10 RX ORDER — ONDANSETRON 2 MG/ML
INJECTION INTRAMUSCULAR; INTRAVENOUS AS NEEDED
Status: DISCONTINUED | OUTPATIENT
Start: 2021-05-10 | End: 2021-05-10 | Stop reason: SURG

## 2021-05-10 RX ORDER — ONDANSETRON 2 MG/ML
4 INJECTION INTRAMUSCULAR; INTRAVENOUS ONCE AS NEEDED
Status: COMPLETED | OUTPATIENT
Start: 2021-05-10 | End: 2021-05-10

## 2021-05-10 RX ORDER — FLUMAZENIL 0.1 MG/ML
0.2 INJECTION INTRAVENOUS AS NEEDED
Status: DISCONTINUED | OUTPATIENT
Start: 2021-05-10 | End: 2021-05-10 | Stop reason: HOSPADM

## 2021-05-10 RX ORDER — PHENYLEPHRINE HCL IN 0.9% NACL 0.8MG/10ML
SYRINGE (ML) INTRAVENOUS AS NEEDED
Status: DISCONTINUED | OUTPATIENT
Start: 2021-05-10 | End: 2021-05-10 | Stop reason: SURG

## 2021-05-10 RX ORDER — HYDROCODONE BITARTRATE AND ACETAMINOPHEN 5; 325 MG/1; MG/1
1 TABLET ORAL EVERY 4 HOURS PRN
Qty: 6 TABLET | Refills: 0 | Status: SHIPPED | OUTPATIENT
Start: 2021-05-10 | End: 2021-08-24

## 2021-05-10 RX ORDER — LIDOCAINE HYDROCHLORIDE 10 MG/ML
0.5 INJECTION, SOLUTION EPIDURAL; INFILTRATION; INTRACAUDAL; PERINEURAL ONCE AS NEEDED
Status: DISCONTINUED | OUTPATIENT
Start: 2021-05-10 | End: 2021-05-10 | Stop reason: HOSPADM

## 2021-05-10 RX ORDER — FAMOTIDINE 10 MG/ML
20 INJECTION, SOLUTION INTRAVENOUS
Status: COMPLETED | OUTPATIENT
Start: 2021-05-10 | End: 2021-05-10

## 2021-05-10 RX ORDER — OXYCODONE AND ACETAMINOPHEN 10; 325 MG/1; MG/1
1 TABLET ORAL ONCE AS NEEDED
Status: COMPLETED | OUTPATIENT
Start: 2021-05-10 | End: 2021-05-10

## 2021-05-10 RX ORDER — IBUPROFEN 600 MG/1
600 TABLET ORAL ONCE AS NEEDED
Status: DISCONTINUED | OUTPATIENT
Start: 2021-05-10 | End: 2021-05-10 | Stop reason: HOSPADM

## 2021-05-10 RX ORDER — FENTANYL CITRATE 50 UG/ML
INJECTION, SOLUTION INTRAMUSCULAR; INTRAVENOUS AS NEEDED
Status: DISCONTINUED | OUTPATIENT
Start: 2021-05-10 | End: 2021-05-10 | Stop reason: SURG

## 2021-05-10 RX ORDER — LIDOCAINE HYDROCHLORIDE 40 MG/ML
SOLUTION TOPICAL AS NEEDED
Status: DISCONTINUED | OUTPATIENT
Start: 2021-05-10 | End: 2021-05-10 | Stop reason: SURG

## 2021-05-10 RX ORDER — MIDAZOLAM HYDROCHLORIDE 1 MG/ML
1 INJECTION INTRAMUSCULAR; INTRAVENOUS
Status: DISCONTINUED | OUTPATIENT
Start: 2021-05-10 | End: 2021-05-10 | Stop reason: HOSPADM

## 2021-05-10 RX ORDER — SODIUM CHLORIDE, SODIUM LACTATE, POTASSIUM CHLORIDE, CALCIUM CHLORIDE 600; 310; 30; 20 MG/100ML; MG/100ML; MG/100ML; MG/100ML
100 INJECTION, SOLUTION INTRAVENOUS CONTINUOUS
Status: DISCONTINUED | OUTPATIENT
Start: 2021-05-10 | End: 2021-05-10 | Stop reason: HOSPADM

## 2021-05-10 RX ORDER — SODIUM CHLORIDE, SODIUM LACTATE, POTASSIUM CHLORIDE, CALCIUM CHLORIDE 600; 310; 30; 20 MG/100ML; MG/100ML; MG/100ML; MG/100ML
1000 INJECTION, SOLUTION INTRAVENOUS CONTINUOUS
Status: DISCONTINUED | OUTPATIENT
Start: 2021-05-10 | End: 2021-05-10 | Stop reason: HOSPADM

## 2021-05-10 RX ORDER — DEXAMETHASONE SODIUM PHOSPHATE 4 MG/ML
INJECTION, SOLUTION INTRA-ARTICULAR; INTRALESIONAL; INTRAMUSCULAR; INTRAVENOUS; SOFT TISSUE AS NEEDED
Status: DISCONTINUED | OUTPATIENT
Start: 2021-05-10 | End: 2021-05-10 | Stop reason: SURG

## 2021-05-10 RX ORDER — NALOXONE HCL 0.4 MG/ML
0.4 VIAL (ML) INJECTION AS NEEDED
Status: DISCONTINUED | OUTPATIENT
Start: 2021-05-10 | End: 2021-05-10 | Stop reason: HOSPADM

## 2021-05-10 RX ORDER — SODIUM CHLORIDE 0.9 % (FLUSH) 0.9 %
3-10 SYRINGE (ML) INJECTION AS NEEDED
Status: DISCONTINUED | OUTPATIENT
Start: 2021-05-10 | End: 2021-05-10 | Stop reason: HOSPADM

## 2021-05-10 RX ORDER — LIDOCAINE HYDROCHLORIDE AND EPINEPHRINE 10; 10 MG/ML; UG/ML
INJECTION, SOLUTION INFILTRATION; PERINEURAL AS NEEDED
Status: DISCONTINUED | OUTPATIENT
Start: 2021-05-10 | End: 2021-05-10 | Stop reason: HOSPADM

## 2021-05-10 RX ORDER — OXYCODONE AND ACETAMINOPHEN 7.5; 325 MG/1; MG/1
2 TABLET ORAL EVERY 4 HOURS PRN
Status: DISCONTINUED | OUTPATIENT
Start: 2021-05-10 | End: 2021-05-10 | Stop reason: HOSPADM

## 2021-05-10 RX ORDER — SODIUM CHLORIDE 0.9 % (FLUSH) 0.9 %
3 SYRINGE (ML) INJECTION AS NEEDED
Status: DISCONTINUED | OUTPATIENT
Start: 2021-05-10 | End: 2021-05-10 | Stop reason: HOSPADM

## 2021-05-10 RX ORDER — PROPOFOL 10 MG/ML
VIAL (ML) INTRAVENOUS AS NEEDED
Status: DISCONTINUED | OUTPATIENT
Start: 2021-05-10 | End: 2021-05-10 | Stop reason: SURG

## 2021-05-10 RX ORDER — MAGNESIUM HYDROXIDE 1200 MG/15ML
LIQUID ORAL AS NEEDED
Status: DISCONTINUED | OUTPATIENT
Start: 2021-05-10 | End: 2021-05-10 | Stop reason: HOSPADM

## 2021-05-10 RX ORDER — ACETAMINOPHEN 500 MG
1000 TABLET ORAL ONCE
Status: COMPLETED | OUTPATIENT
Start: 2021-05-10 | End: 2021-05-10

## 2021-05-10 RX ORDER — HYDROCODONE BITARTRATE AND ACETAMINOPHEN 5; 325 MG/1; MG/1
1 TABLET ORAL ONCE AS NEEDED
Status: DISCONTINUED | OUTPATIENT
Start: 2021-05-10 | End: 2021-05-10 | Stop reason: HOSPADM

## 2021-05-10 RX ORDER — ROCURONIUM BROMIDE 10 MG/ML
INJECTION, SOLUTION INTRAVENOUS AS NEEDED
Status: DISCONTINUED | OUTPATIENT
Start: 2021-05-10 | End: 2021-05-10 | Stop reason: SURG

## 2021-05-10 RX ADMIN — ACETAMINOPHEN 1000 MG: 500 TABLET, FILM COATED ORAL at 07:42

## 2021-05-10 RX ADMIN — ROCURONIUM BROMIDE 10 MG: 10 INJECTION INTRAVENOUS at 08:31

## 2021-05-10 RX ADMIN — Medication 80 MCG: at 09:11

## 2021-05-10 RX ADMIN — ONDANSETRON 4 MG: 2 INJECTION INTRAMUSCULAR; INTRAVENOUS at 08:31

## 2021-05-10 RX ADMIN — OXYCODONE HYDROCHLORIDE AND ACETAMINOPHEN 1 TABLET: 10; 325 TABLET ORAL at 09:48

## 2021-05-10 RX ADMIN — LIDOCAINE HYDROCHLORIDE 60 MG: 20 INJECTION, SOLUTION EPIDURAL; INFILTRATION; INTRACAUDAL; PERINEURAL at 08:31

## 2021-05-10 RX ADMIN — SODIUM CHLORIDE, POTASSIUM CHLORIDE, SODIUM LACTATE AND CALCIUM CHLORIDE 1000 ML: 600; 310; 30; 20 INJECTION, SOLUTION INTRAVENOUS at 07:15

## 2021-05-10 RX ADMIN — FENTANYL CITRATE 50 MCG: 50 INJECTION, SOLUTION INTRAMUSCULAR; INTRAVENOUS at 08:29

## 2021-05-10 RX ADMIN — SODIUM CHLORIDE, POTASSIUM CHLORIDE, SODIUM LACTATE AND CALCIUM CHLORIDE 1000 ML: 600; 310; 30; 20 INJECTION, SOLUTION INTRAVENOUS at 09:50

## 2021-05-10 RX ADMIN — Medication 80 MCG: at 09:16

## 2021-05-10 RX ADMIN — FENTANYL CITRATE 50 MCG: 50 INJECTION, SOLUTION INTRAMUSCULAR; INTRAVENOUS at 08:38

## 2021-05-10 RX ADMIN — DEXAMETHASONE SODIUM PHOSPHATE 8 MG: 4 INJECTION, SOLUTION INTRA-ARTICULAR; INTRALESIONAL; INTRAMUSCULAR; INTRAVENOUS; SOFT TISSUE at 08:31

## 2021-05-10 RX ADMIN — Medication 100 MCG: at 08:52

## 2021-05-10 RX ADMIN — SUCCINYLCHOLINE CHLORIDE 120 MG: 20 INJECTION, SOLUTION INTRAMUSCULAR; INTRAVENOUS at 08:31

## 2021-05-10 RX ADMIN — LIDOCAINE HYDROCHLORIDE 1 EACH: 40 SOLUTION TOPICAL at 08:31

## 2021-05-10 RX ADMIN — FAMOTIDINE 20 MG: 10 INJECTION INTRAVENOUS at 07:42

## 2021-05-10 RX ADMIN — ONDANSETRON HYDROCHLORIDE 4 MG: 2 SOLUTION INTRAMUSCULAR; INTRAVENOUS at 10:49

## 2021-05-10 RX ADMIN — PROPOFOL 150 MG: 10 INJECTION, EMULSION INTRAVENOUS at 08:31

## 2021-05-10 NOTE — ANESTHESIA PROCEDURE NOTES
Airway  Urgency: elective    Date/Time: 5/10/2021 8:32 AM  Airway not difficult    General Information and Staff    Patient location during procedure: OR  CRNA: Tigre Quintero CRNA    Indications and Patient Condition  Indications for airway management: airway protection    Preoxygenated: yes  Mask difficulty assessment: 0 - not attempted    Final Airway Details  Final airway type: endotracheal airway      Successful airway: ETT  Cuffed: yes   Successful intubation technique: direct laryngoscopy  Endotracheal tube insertion site: oral  Blade: Torrez  Blade size: 2  ETT size (mm): 7.0  Cormack-Lehane Classification: grade I - full view of glottis  Placement verified by: capnometry   Measured from: lips  ETT/EBT  to lips (cm): 21  Number of attempts at approach: 1  Assessment: lips, teeth, and gum same as pre-op and atraumatic intubation

## 2021-05-10 NOTE — ANESTHESIA PREPROCEDURE EVALUATION
Anesthesia Evaluation     Patient summary reviewed and Nursing notes reviewed   no history of anesthetic complications:  NPO Solid Status: > 8 hours  NPO Liquid Status: > 8 hours           Airway   Mallampati: I  TM distance: >3 FB  Neck ROM: full  No difficulty expected  Dental      Pulmonary    (+) asthma,  (-) not a smoker  Cardiovascular   Exercise tolerance: good (4-7 METS)    ECG reviewed      ROS comment: Low risk stress echo 6/2020    Neuro/Psych  GI/Hepatic/Renal/Endo    (+)  GERD poorly controlled,      Musculoskeletal     Abdominal    Substance History      OB/GYN          Other                        Anesthesia Plan    ASA 2     general   (Poorly controlled GERD, will pretx with pepcid)  intravenous induction     Anesthetic plan, all risks, benefits, and alternatives have been provided, discussed and informed consent has been obtained with: patient.

## 2021-05-10 NOTE — ANESTHESIA POSTPROCEDURE EVALUATION
Patient: Lizbeth Rivera    Procedure Summary     Date: 05/10/21 Room / Location:  [facility] OR 02 /  [facility] OR    Anesthesia Start: 0828 Anesthesia Stop: 0930    Procedure: Excisional biopsy right level II cervical lymph node (Right Neck) Diagnosis:       Lymphadenopathy of head and neck      Gastroesophageal reflux disease without esophagitis      Hx of parotitis      (Lymphadenopathy of head and neck [R59.1])      (Gastroesophageal reflux disease without esophagitis [K21.9])      (Hx of parotitis [Z87.19])    Surgeons: Christopher Hurd MD Provider: Tigre Quintero CRNA    Anesthesia Type: general ASA Status: 2          Anesthesia Type: general    Vitals  Vitals Value Taken Time   /72 05/10/21 1002   Temp 97.5 °F (36.4 °C) 05/10/21 0955   Pulse 75 05/10/21 1003   Resp 16 05/10/21 0955   SpO2 94 % 05/10/21 1003   Vitals shown include unvalidated device data.        Post Anesthesia Care and Evaluation    Patient location during evaluation: PACU  Patient participation: complete - patient participated  Level of consciousness: awake and alert  Pain management: adequate  Airway patency: patent  Anesthetic complications: No anesthetic complications    Cardiovascular status: acceptable  Respiratory status: acceptable  Hydration status: acceptable    Comments: Blood pressure 120/64, pulse 77, temperature 97.5 °F (36.4 °C), resp. rate 16, SpO2 96 %, not currently breastfeeding.    Pt discharged from PACU based on yann score >8  No anesthesia care post op

## 2021-05-12 LAB
LAB AP CASE REPORT: NORMAL
PATH REPORT.FINAL DX SPEC: NORMAL
PATH REPORT.GROSS SPEC: NORMAL

## 2021-05-17 LAB
C-REACTIVE PROTEIN: 0.98 MG/DL (ref 0–0.5)
LACTATE DEHYDROGENASE: 155 U/L (ref 91–215)
SEDIMENTATION RATE, ERYTHROCYTE: 11 MM/HR (ref 0–25)
TOTAL CK: 42 U/L (ref 26–192)
VITAMIN D 25-HYDROXY: 44.3 NG/ML

## 2021-05-19 LAB
C3 COMPLEMENT: 151 MG/DL (ref 88–201)
C4 COMPLEMENT: 19 MG/DL (ref 10–40)

## 2021-05-20 LAB
ALDOLASE: 3.9 U/L (ref 1.5–8.1)
THYROGLOBULIN AB: <0.9 IU/ML (ref 0–4)
THYROID PEROXIDASE (TPO) ABS: <0.3 IU/ML (ref 0–9)

## 2021-05-21 ENCOUNTER — OFFICE VISIT (OUTPATIENT)
Dept: OTOLARYNGOLOGY | Facility: CLINIC | Age: 56
End: 2021-05-21

## 2021-05-21 DIAGNOSIS — R59.1 LYMPHADENOPATHY OF HEAD AND NECK: Primary | ICD-10-CM

## 2021-05-21 PROCEDURE — 99024 POSTOP FOLLOW-UP VISIT: CPT | Performed by: OTOLARYNGOLOGY

## 2021-05-21 NOTE — PROGRESS NOTES
Procedure   Suture Removal    Date/Time: 5/21/2021 9:58 AM  Performed by: Laurita Navarrete MA  Authorized by: Christopher Hurd MD   Body area: head/neck  Location details: neck  Comments: Well approximated with no edema or redness; path discussed

## 2021-05-22 LAB
ALBUMIN SERPL-MCNC: 3.9 G/DL (ref 3.75–5.01)
ALPHA-1-GLOBULIN: 0.31 G/DL (ref 0.19–0.46)
ALPHA-2-GLOBULIN: 0.81 G/DL (ref 0.48–1.05)
BETA GLOBULIN: 0.88 G/DL (ref 0.48–1.1)
GAMMA GLOBULIN: 1.11 G/DL (ref 0.62–1.51)
PROTEIN ELECTROPHORESIS, SERUM: NORMAL
SPE/IFE INTERPRETATION: NORMAL
TOTAL PROTEIN: 7 G/DL (ref 6.3–8.2)

## 2021-05-23 LAB
ANCA IFA: NORMAL
MYELOPEROXIDASE AB: 0 AU/ML (ref 0–19)
SERINE PROTEASE 3 AB: 2 AU/ML (ref 0–19)

## 2021-08-23 NOTE — PROGRESS NOTES
YOB: 1965  Location: [facility]  Location Address: [address] [place] [address], [facility], [address] [place] [address]  Location Phone: [phone]    Chief Complaint   Patient presents with   • Follow-up     lymph node biopsy right neck       History of Present Illness  Lizbeth Rivera is a 55 y.o. female.  Lizbeth Rivera is status post Excisional biopsy of right level II cervical lymph node on 5/10/21. Patient states the lymph node has decreased in size. Patient is having issues with allergy and sinus. She is taking Allegra D in the morning, Allegra in the morning, nasacort, astelin and hydroxyzine. Patient has been seen by Dr. Monsalve but has not had immunotherapy as his pa thought symptoms were not allergy related   She had allergy testing in [place] several years ago but states she would like this done again as this was at least 15 years ago and she had benadryl prior to testing, she never received injections as she was told she was allergic to too many things as well as preservatives. She states about 10 days ago she was having difficulty breathing due to increased amount and thickness of mucus. She is currently taking pepcid bid as she is allergic to all ppis; she has been taking this prior to breakfast and at bedtime. She also takes carafate       She currently sees rheumatology due to elevated crp, sed rate and addie     Drinks about 64 ounces of water daily she consumes dairy daily with most meals      Tissue Pathology Exam (05/10/2021 08:51)         Past Medical History:   Diagnosis Date   • Abnormal ECG    • Asthma     some form of    • Colin's esophagus    • Celiac disease    • Joint pain    • Neuropathy    • Polyneuropathy    • Seasonal allergies    • Sinus headache        Past Surgical History:   Procedure Laterality Date   • CERVICAL LYMPH NODE BIOPSY/EXCISION Right 5/10/2021    Procedure: Excisional biopsy right level II cervical lymph node;  Surgeon: Christopher Hurd MD;  Location:   [facility];  Service: ENT;  Laterality: Right;   • CHOLECYSTECTOMY     • TONSILLECTOMY         Outpatient Medications Marked as Taking for the 8/24/21 encounter (Office Visit) with Christopher Hurd MD   Medication Sig Dispense Refill   • albuterol sulfate  (90 Base) MCG/ACT inhaler albuterol sulfate HFA 90 mcg/actuation aerosol inhaler   INHALE 2 PUFFS EVERY 4 6 HOURS AS NEEDED FOR COUGH,WHEEZE, SHORTNESS OF BREATH AND CHEST TIGHTNESS     • Allegra-D Allergy & Congestion  MG per 12 hr tablet TAKE 1 TABLET ONCE OR TWICE DAILY     • azelastine (ASTELIN) 0.1 % nasal spray SPRAY 1 SPRAY INTO EACH NOSTRIL TWICE A DAY     • B Complex Vitamins (B COMPLEX 1 PO) Take 1 tablet by mouth Daily.     • clindamycin (CLEOCIN T) 1 % external solution clindamycin phosphate 1 % topical solution   APPLY ONCE DAILY TO FACE AFTER SHOWER     • COLLAGEN-BORON-HYALURONIC ACID PO Take 1 tablet by mouth Daily.     • [employer] ALLERGY RELIEF-D12 5-120 MG per 12 hr tablet TAKE ONE TWICE A DAY AS NEEDED  2   • EPINEPHrine (EPIPEN 2-FOSTER) 0.3 MG/0.3ML solution auto-injector injection EpiPen     • famotidine (PEPCID) 20 MG tablet Take 20 mg by mouth 2 (Two) Times a Day.     • fexofenadine-pseudoephedrine (ALLEGRA-D 24) 180-240 MG per 24 hr tablet Take 1 tablet by mouth Daily.     • fexofenadine-pseudoephedrine (Allegra-D Allergy & Congestion)  MG per 12 hr tablet Allegra-D 12 Hour 60 mg-120 mg tablet,extended release   TAKE 1 TABLET ONCE OR TWICE DAILY     • gabapentin (NEURONTIN) 300 MG capsule Take 300 mg by mouth Daily.     • gabapentin (NEURONTIN) 600 MG tablet 600 mg 2 (Two) Times a Day.     • guaiFENesin (Mucinex) 600 MG 12 hr tablet      • hydrOXYzine (ATARAX) 25 MG tablet TAKE 2 TABLETS BY MOUTH AT BEDTIME AS NEEDED     • hydrOXYzine (ATARAX) 50 MG tablet Take 50 mg by mouth Every Night.     • MAGNESIUM PO Take 100 mg by mouth 2 (Two) Times a Day.     • MULTIPLE VITAMIN PO Take 1 tablet by mouth Daily.     • OMEGA 3-6-9 FATTY  ACIDS PO Take 1 tablet by mouth Daily.     • Saline 0.9 % aerosol solution into the nostril(s) as directed by provider.     • Selenium (Selenicaps-200) 200 MCG capsule      • sucralfate (CARAFATE) 1 g tablet TAKE 1 TABLET BY MOUTH ON AN EMPTY STOMACH THREE TIMES A DAY BEFORE MEALS ORALLY 30 DAY(S)  5   • Triamcinolone Acetonide (NASACORT ALLERGY 24HR) 55 MCG/ACT nasal inhaler into the nostril(s) as directed by provider.     • vitamin B-12 (CYANOCOBALAMIN) 1000 MCG tablet Take  by mouth Daily.     • VITAMIN D, CHOLECALCIFEROL, PO Take 1 tablet by mouth Daily.         Amoxicillin-pot clavulanate, Clarithromycin, Doxepin, Sulfamethoxazole-trimethoprim, Gluten meal, Glutethimides, Gemifloxacin, Latex, and Proton pump inhibitors    Family History   Problem Relation Age of Onset   • Diabetes Maternal Grandmother    • Diabetes Maternal Grandfather        Social History     Socioeconomic History   • Marital status:      Spouse name: Not on file   • Number of children: Not on file   • Years of education: Not on file   • Highest education level: Not on file   Tobacco Use   • Smoking status: Never Smoker   • Smokeless tobacco: Never Used   Vaping Use   • Vaping Use: Never used   Substance and Sexual Activity   • Alcohol use: No   • Drug use: No   • Sexual activity: Defer       Review of Systems   Constitutional: Negative.    HENT: Positive for congestion, ear pain, postnasal drip, rhinorrhea and sinus pressure.         Cervical lymphadenopathy   Eyes: Negative.    Respiratory: Negative.    Cardiovascular: Negative.    Gastrointestinal: Negative.    Genitourinary: Negative.    Musculoskeletal: Negative.    Skin: Negative.    Allergic/Immunologic: Positive for environmental allergies.   Neurological: Negative.    Hematological: Negative.    Psychiatric/Behavioral: Negative.        Vitals:    08/24/21 1106   BP: 139/87   Pulse: 93   Temp: 97.2 °F (36.2 °C)       Body mass index is 28.28 kg/m².    Objective     Physical  Exam  Vitals reviewed.   Constitutional:       Appearance: She is obese.   HENT:      Head: Normocephalic.      Right Ear: Hearing, tympanic membrane, ear canal and external ear normal.      Left Ear: Hearing, tympanic membrane and external ear normal.      Nose: Septal deviation present.      Right Turbinates: Enlarged.      Left Turbinates: Enlarged.      Mouth/Throat:      Lips: Pink.      Pharynx: Oropharynx is clear. Uvula midline.      Comments: Pain and crepitus on right side with palpation during open and close of jaw     Neck:     Pulmonary:      Effort: Pulmonary effort is normal.   Lymphadenopathy:      Cervical: No cervical adenopathy.   Neurological:      Mental Status: She is alert.         Assessment/Plan   Diagnoses and all orders for this visit:    1. Lymphadenopathy of head and neck (Primary)    2. Gastroesophageal reflux disease without esophagitis    3. Hx of parotitis    4. Anterior cervical lymphadenopathy    5. Chronic allergic rhinitis  -     Allergens (21) Pollens; Future  -     Allergens (19); Future  -     Allergens (9) Dairy / Egg; Future    6. TMJ dysfunction    7. Nasal congestion    will order allergy testing   Discussed with patient at length methods to thin mucus consistency   Stop dairy for at least 6 - 8 weeks   Continue reflux medications as discussed (pepcid prior to last meal of the day)   Will follow after allergy testing and patient has adhered to recommendation for 8 weeks to discuss sublingual allergy drops       * Surgery not found *  Orders Placed This Encounter   Procedures   • Allergens (21) Pollens     Standing Status:   Future     Standing Expiration Date:   8/24/2022     Order Specific Question:   Release to patient     Answer:   Immediate   • Allergens (19)     Standing Status:   Future     Standing Expiration Date:   8/24/2022     Order Specific Question:   Release to patient     Answer:   Immediate   • Allergens (9) Dairy / Egg     Standing Status:   Future      Standing Expiration Date:   2022     Order Specific Question:   Release to patient     Answer:   Immediate     Return in about 9 weeks (around 10/26/2021) for Recheck allergy symptoms/lymphadenopathy.       Patient Instructions   CONTACT INFORMATION:  The main office phone number is [phone]. For emergencies after hours and on weekends, this number will convert over to our answering service and the on call provider will answer. Please try to keep non emergent phone calls/ questions to office hours 9am-5pm Monday through Friday.      CoreDial  As an alternative, you can sign up and use the Epic MyChart system for more direct and quicker access for non emergent questions/ problems.  [employer] allows you to send messages to your doctor, view your test results, renew your prescriptions, schedule appointments, and more. To sign up, go to Endorse and click on the Sign Up Now link in the New User? box. Enter your CoreDial Activation Code exactly as it appears below along with the last four digits of your Social Security Number and your Date of Birth () to complete the sign-up process. If you do not sign up before the expiration date, you must request a new code.     CoreDial Activation Code: Activation code not generated  Current CoreDial Status: Active     If you have questions, you can email [email or web address] or call [phone] to talk to our CoreDial staff. Remember, CoreDial is NOT to be used for urgent needs. For medical emergencies, dial 911.     IF YOU SMOKE OR USE TOBACCO PLEASE READ THE FOLLOWING:  Why is smoking bad for me?  Smoking increases the risk of heart disease, lung disease, vascular disease, stroke, and cancer. If you smoke, STOP!        IF YOU SMOKE OR USE TOBACCO PLEASE READ THE FOLLOWING:  Why is smoking bad for me?  Smoking increases the risk of heart disease, lung disease, vascular disease, stroke, and cancer. If you smoke, STOP!     For more  information:  Quit Now [facility]  1-800-QUIT-NOW  [email or web address]      Gastroesophageal Reflux Disease (Laryngopharyngeal Reflux), Adult  Gastroesophageal reflux disease (GERD) and/or Laryngopharyngeal Reflux, (LPR) happens when acid from your stomach flows up into the esophagus and/or throat and voicebox or larynx. When acid comes in contact with the these organs, the acid can cause soreness (inflammation). Over time, GERD may create small holes (ulcers) in the lining of the esophagus and may lead to the development of hoarseness, difficulty swallowing,   feeling of something stuck in the throat, increased mucous or drainage and even predispose to the development of malignancies, (cancer).    CAUSES   · Increased body weight. This puts pressure on the stomach, making acid rise from the stomach into the esophagus.  · Smoking. This increases acid production in the stomach.  · Drinking alcohol. This causes decreased pressure in the lower esophageal sphincter (valve or ring of muscle between the esophagus and stomach), allowing acid from the stomach into the esophagus.  · Late evening meals and a full stomach. This increases pressure and acid production in the stomach.  · A malformed lower esophageal sphincter  · Diet which can include avoidance of gluten and dairy products  · Age  SYMPTOMS   · Burning pain in the lower part of the mid-chest behind the breastbone and in the mid-stomach area. This may occur twice a week or more often.  · Trouble swallowing.  · Sore throat.  · Dry cough.  · Asthma-like symptoms including chest tightness, shortness of breath, or wheezing.  · Globus sensation-something stuck in the throat/fullness  · Hoarseness  DIAGNOSIS   Your caregiver may be able to diagnose GERD based on your symptoms. In some cases, X-rays and other tests may be done to check for complications or to check the condition of your stomach and esophagus.  You may need to see another doctor.  TREATMENT    Over-the-counter or prescription medicines to help decrease acid production.   Dietary and behavioral modifications or changes may be also recommended.  HOME CARE INSTRUCTIONS   · Change the factors that you can control. Ask your caregiver for guidance concerning weight loss, quitting smoking, and alcohol consumption.  · Avoid foods and drinks that make your symptoms worse, and MAY include such as:  ¨ Caffeine or alcoholic drinks.  ¨ Chocolate.  ¨ Gluten containing foods  ¨ Dairy  ¨ Peppermint or mint flavorings.  ¨ Garlic and onions.  ¨ Spicy foods.  ¨ Citrus fruits, such as oranges, oriana, or limes.  ¨ Tomato-based foods such as sauce, chili, salsa, and pizza.  ¨ Fried and fatty foods.  · Avoid lying down for the 3 hours prior to your bedtime or prior to taking a nap.  · Eat small, frequent meals instead of large meals.  · Wear loose-fitting clothing. Do not wear anything tight around your waist that causes pressure on your stomach.  · Raise the head of your bed 6 to 8 inches with wood blocks to help you sleep. Extra pillows will not help.  · Only take over-the-counter or prescription medicines for pain, discomfort, or fever as directed by your caregiver.  · Do not take aspirin, ibuprofen, or other nonsteroidal anti-inflammatory drugs if possible (NSAIDs).  SEEK IMMEDIATE MEDICAL CARE IF:   · You have pain in your arms, neck, jaw, teeth, or back.  · Your pain increases or changes in intensity or duration.  · You develop nausea, vomiting, or sweating (diaphoresis).  · You develop shortness of breath, or you faint.  · Your vomit is green, yellow, black, or looks like coffee grounds or blood.  · Your stool is red, bloody, or black.  These symptoms could be signs of other problems, such as heart disease, gastric bleeding, or esophageal bleeding.  MAKE SURE YOU:   · Understand these instructions.  · Will watch your condition.  · Will get help right away if you are not doing well or get worse.     This information is  not intended to replace advice given to you by your physician. Make sure you discuss any questions you have with your health care provider.     Modified by Christopher Hudr MD, FACS 9/8/2016.  Document Released: 09/27/2006 Document Revised: 01/08/2016 Document Reviewed: 04/13/2016  [employer] Interactive Patient Education ©2016 [employer]    For the best response, use your nasal sprays every day without skipping doses. It may take several weeks before the full effect is acheived.     Stop allegra   Stop milk and dairy consumption for at least 8 weeks   Take pepcid before breakfast and before last meal of the day   Use heat and massage to temporomandibular joint a couple times per day; decrease chewing gum use; continue to use bite guard       Allergy testing packet with instructions given to patient in clinic

## 2021-08-24 ENCOUNTER — OFFICE VISIT (OUTPATIENT)
Dept: OTOLARYNGOLOGY | Facility: CLINIC | Age: 56
End: 2021-08-24

## 2021-08-24 VITALS
BODY MASS INDEX: 28.61 KG/M2 | HEIGHT: 67 IN | DIASTOLIC BLOOD PRESSURE: 87 MMHG | SYSTOLIC BLOOD PRESSURE: 139 MMHG | HEART RATE: 93 BPM | WEIGHT: 182.3 LBS | TEMPERATURE: 97.2 F

## 2021-08-24 DIAGNOSIS — K21.9 GASTROESOPHAGEAL REFLUX DISEASE WITHOUT ESOPHAGITIS: ICD-10-CM

## 2021-08-24 DIAGNOSIS — R09.81 NASAL CONGESTION: ICD-10-CM

## 2021-08-24 DIAGNOSIS — R59.1 LYMPHADENOPATHY OF HEAD AND NECK: Primary | ICD-10-CM

## 2021-08-24 DIAGNOSIS — Z87.19 HX OF PAROTITIS: ICD-10-CM

## 2021-08-24 DIAGNOSIS — J30.9 CHRONIC ALLERGIC RHINITIS: ICD-10-CM

## 2021-08-24 DIAGNOSIS — R59.0 ANTERIOR CERVICAL LYMPHADENOPATHY: ICD-10-CM

## 2021-08-24 DIAGNOSIS — M26.609 TMJ DYSFUNCTION: ICD-10-CM

## 2021-08-24 PROCEDURE — 99213 OFFICE O/P EST LOW 20 MIN: CPT | Performed by: OTOLARYNGOLOGY

## 2021-08-24 RX ORDER — FEXOFENADINE HCL AND PSEUDOEPHEDRINE HCI 60; 120 MG/1; MG/1
TABLET, EXTENDED RELEASE ORAL
COMMUNITY
End: 2022-03-03

## 2021-08-24 RX ORDER — FEXOFENADINE HYDROCHLORIDE AND PSEUDOEPHEDRINE HYDROCHLORIDE 60; 120 MG/1; MG/1
TABLET, FILM COATED, EXTENDED RELEASE ORAL
COMMUNITY
Start: 2021-08-21 | End: 2022-03-03

## 2021-08-24 RX ORDER — GUAIFENESIN 600 MG/1
TABLET, EXTENDED RELEASE ORAL
COMMUNITY
Start: 2021-08-15 | End: 2022-10-28

## 2021-08-24 RX ORDER — HYDROXYZINE HYDROCHLORIDE 25 MG/1
TABLET, FILM COATED ORAL
COMMUNITY
Start: 2021-06-15 | End: 2022-10-28

## 2021-08-24 NOTE — PROGRESS NOTES
Christopher Hurd MD   I have seen and/or examined Lizbeth Rivera and have reviewed the notes, assessments, and/or procedures and I concur with this documentation.    Crhistopher Hurd MD, FACS  08/24/21  4:15 PM CDT

## 2021-08-24 NOTE — PATIENT INSTRUCTIONS
CONTACT INFORMATION:  The main office phone number is [phone]. For emergencies after hours and on weekends, this number will convert over to our answering service and the on call provider will answer. Please try to keep non emergent phone calls/ questions to office hours 9am-5pm Monday through Friday.      Nano ePrint  As an alternative, you can sign up and use the Epic MyChart system for more direct and quicker access for non emergent questions/ problems.  MobileDay allows you to send messages to your doctor, view your test results, renew your prescriptions, schedule appointments, and more. To sign up, go to [employer] and click on the Sign Up Now link in the New User? box. Enter your Nano ePrint Activation Code exactly as it appears below along with the last four digits of your Social Security Number and your Date of Birth () to complete the sign-up process. If you do not sign up before the expiration date, you must request a new code.     Nano ePrint Activation Code: Activation code not generated  Current Nano ePrint Status: Active     If you have questions, you can email [email or web address] or call [phone] to talk to our Nano ePrint staff. Remember, Nano ePrint is NOT to be used for urgent needs. For medical emergencies, dial 911.     IF YOU SMOKE OR USE TOBACCO PLEASE READ THE FOLLOWING:  Why is smoking bad for me?  Smoking increases the risk of heart disease, lung disease, vascular disease, stroke, and cancer. If you smoke, STOP!        IF YOU SMOKE OR USE TOBACCO PLEASE READ THE FOLLOWING:  Why is smoking bad for me?  Smoking increases the risk of heart disease, lung disease, vascular disease, stroke, and cancer. If you smoke, STOP!     For more information:  Quit Now [place]  -QUIT-NOW  [email or web address]      Gastroesophageal Reflux Disease (Laryngopharyngeal Reflux), Adult  Gastroesophageal reflux disease (GERD) and/or Laryngopharyngeal Reflux, (LPR)  happens when acid from your stomach flows up into the esophagus and/or throat and voicebox or larynx. When acid comes in contact with the these organs, the acid can cause soreness (inflammation). Over time, GERD may create small holes (ulcers) in the lining of the esophagus and may lead to the development of hoarseness, difficulty swallowing,   feeling of something stuck in the throat, increased mucous or drainage and even predispose to the development of malignancies, (cancer).    CAUSES   · Increased body weight. This puts pressure on the stomach, making acid rise from the stomach into the esophagus.  · Smoking. This increases acid production in the stomach.  · Drinking alcohol. This causes decreased pressure in the lower esophageal sphincter (valve or ring of muscle between the esophagus and stomach), allowing acid from the stomach into the esophagus.  · Late evening meals and a full stomach. This increases pressure and acid production in the stomach.  · A malformed lower esophageal sphincter  · Diet which can include avoidance of gluten and dairy products  · Age  SYMPTOMS   · Burning pain in the lower part of the mid-chest behind the breastbone and in the mid-stomach area. This may occur twice a week or more often.  · Trouble swallowing.  · Sore throat.  · Dry cough.  · Asthma-like symptoms including chest tightness, shortness of breath, or wheezing.  · Globus sensation-something stuck in the throat/fullness  · Hoarseness  DIAGNOSIS   Your caregiver may be able to diagnose GERD based on your symptoms. In some cases, X-rays and other tests may be done to check for complications or to check the condition of your stomach and esophagus.  You may need to see another doctor.  TREATMENT   Over-the-counter or prescription medicines to help decrease acid production.   Dietary and behavioral modifications or changes may be also recommended.  HOME CARE INSTRUCTIONS   · Change the factors that you can control. Ask your  caregiver for guidance concerning weight loss, quitting smoking, and alcohol consumption.  · Avoid foods and drinks that make your symptoms worse, and MAY include such as:  ¨ Caffeine or alcoholic drinks.  ¨ Chocolate.  ¨ Gluten containing foods  ¨ Dairy  ¨ Peppermint or mint flavorings.  ¨ Garlic and onions.  ¨ Spicy foods.  ¨ Citrus fruits, such as oranges, oriana, or limes.  ¨ Tomato-based foods such as sauce, chili, salsa, and pizza.  ¨ Fried and fatty foods.  · Avoid lying down for the 3 hours prior to your bedtime or prior to taking a nap.  · Eat small, frequent meals instead of large meals.  · Wear loose-fitting clothing. Do not wear anything tight around your waist that causes pressure on your stomach.  · Raise the head of your bed 6 to 8 inches with wood blocks to help you sleep. Extra pillows will not help.  · Only take over-the-counter or prescription medicines for pain, discomfort, or fever as directed by your caregiver.  · Do not take aspirin, ibuprofen, or other nonsteroidal anti-inflammatory drugs if possible (NSAIDs).  SEEK IMMEDIATE MEDICAL CARE IF:   · You have pain in your arms, neck, jaw, teeth, or back.  · Your pain increases or changes in intensity or duration.  · You develop nausea, vomiting, or sweating (diaphoresis).  · You develop shortness of breath, or you faint.  · Your vomit is green, yellow, black, or looks like coffee grounds or blood.  · Your stool is red, bloody, or black.  These symptoms could be signs of other problems, such as heart disease, gastric bleeding, or esophageal bleeding.  MAKE SURE YOU:   · Understand these instructions.  · Will watch your condition.  · Will get help right away if you are not doing well or get worse.     This information is not intended to replace advice given to you by your physician. Make sure you discuss any questions you have with your health care provider.     Modified by Christopher Hurd MD, FACS 9/8/2016.  Document Released: 09/27/2006 Document  Revised: 01/08/2016 Document Reviewed: 04/13/2016  [employer] Interactive Patient Education ©2016 [employer]    For the best response, use your nasal sprays every day without skipping doses. It may take several weeks before the full effect is acheived.     Stop allegra   Stop milk and dairy consumption for at least 8 weeks   Take pepcid before breakfast and before last meal of the day   Use heat and massage to temporomandibular joint a couple times per day; decrease chewing gum use; continue to use bite guard       Allergy testing packet with instructions given to patient in clinic

## 2021-08-26 DIAGNOSIS — K90.0 CELIAC DISEASE: ICD-10-CM

## 2021-08-26 DIAGNOSIS — K21.9 GASTROESOPHAGEAL REFLUX DISEASE WITHOUT ESOPHAGITIS: Primary | ICD-10-CM

## 2021-08-26 DIAGNOSIS — R59.1 LYMPHADENOPATHY OF HEAD AND NECK: ICD-10-CM

## 2021-08-30 ENCOUNTER — LAB (OUTPATIENT)
Dept: LAB | Facility: HOSPITAL | Age: 56
End: 2021-08-30

## 2021-08-30 DIAGNOSIS — J30.9 CHRONIC ALLERGIC RHINITIS: ICD-10-CM

## 2021-08-30 DIAGNOSIS — K21.9 GASTROESOPHAGEAL REFLUX DISEASE WITHOUT ESOPHAGITIS: ICD-10-CM

## 2021-08-30 DIAGNOSIS — R59.1 LYMPHADENOPATHY OF HEAD AND NECK: ICD-10-CM

## 2021-08-30 DIAGNOSIS — K90.0 CELIAC DISEASE: ICD-10-CM

## 2021-08-30 PROCEDURE — 86003 ALLG SPEC IGE CRUDE XTRC EA: CPT

## 2021-08-30 PROCEDURE — 86008 ALLG SPEC IGE RECOMB EA: CPT

## 2021-08-30 PROCEDURE — 36415 COLL VENOUS BLD VENIPUNCTURE: CPT

## 2021-09-01 LAB
A ALTERNATA IGE QN: <0.1 KU/L
A FUMIGATUS IGE QN: <0.1 KU/L
BAKER'S YEAST IGE QN: <0.1 KU/L
BARLEY IGE QN: 0.1 KU/L
C ALBICANS IGE QN: <0.1 KU/L
C HERBARUM IGE QN: <0.1 KU/L
CASEIN IGE QN: <0.1 KU/L
CAT DANDER IGE QN: <0.1 KU/L
CHEESE MOLD IGE QN: 0.2 KU/L
CHICKEN FEATHER IGE QN: <0.1 KU/L
CLAM IGE QN: <0.1 KU/L
COCOA IGE QN: <0.1 KU/L
CODFISH IGE QN: <0.1 KU/L
CONV CLASS DESCRIPTION: ABNORMAL
CONV CLASS DESCRIPTION: NORMAL
CORN IGE QN: <0.1 KU/L
COW MILK IGE QN: 0.38 KU/L
CRAB IGE QN: <0.1 KU/L
D FARINAE IGE QN: <0.1 KU/L
D PTERONYSS IGE QN: <0.1 KU/L
DOG DANDER IGE QN: <0.1 KU/L
DUCK FEATHER IGE QN: <0.1 KU/L
E PURPURASCENS IGE QN: <0.1 KU/L
EGG WHITE IGE QN: <0.1 KU/L
F MONILIFORME IGE QN: <0.1 KU/L
GLUTEN IGE QN: <0.1 KU/L
GOOSE FEATHER IGE QN: <0.1 KU/L
HORSE EPITH IGE QN: <0.1 KU/L
M RACEMOSUS IGE QN: <0.1 KU/L
OAT IGE QN: <0.1 KU/L
P BETAE IGE QN: <0.1 KU/L
P NOTATUM IGE QN: <0.1 KU/L
PEA IGE QN: <0.1 KU/L
PEANUT IGE QN: <0.1 KU/L
PECAN/HICK NUT IGE QN: <0.1 KU/L
R NIGRICANS IGE QN: <0.1 KU/L
RYE IGE QN: <0.1 KU/L
SESAME SEED IGE QN: <0.1 KU/L
SHRIMP IGE QN: <0.1 KU/L
SOYBEAN IGE QN: 0.12 KU/L
T RUBRUM IGE QN: <0.1 KU/L
WALNUT IGE QN: <0.1 KU/L
WHEAT IGE QN: 0.13 KU/L

## 2021-09-02 LAB
AMER BEECH IGE QN: 0.22 KU/L
BERMUDA GRASS IGE QN: 5.87 KU/L
BOXELDER IGE QN: 0.28 KU/L
CALIF WALNUT POLN IGE QN: 0.34 KU/L
CMN PIGWEED IGE QN: 0.31 KU/L
COCKLEBUR IGE QN: 0.32 KU/L
COMMON RAGWEED IGE QN: 0.96 KU/L
CONV CLASS DESCRIPTION: ABNORMAL
COTTONWOOD IGE QN: 0.31 KU/L
ENGL PLANTAIN IGE QN: 0.12 KU/L
GIANT RAGWEED IGE QN: 0.29 KU/L
GOOSEFOOT IGE QN: 0.17 KU/L
JOHNSON GRASS IGE QN: 4.06 KU/L
KENT BLUE GRASS IGE QN: 5.75 KU/L
MUGWORT IGE QN: <0.1 KU/L
PECAN/HICK TREE IGE QN: 4.89 KU/L
SHEEP SORREL IGE QN: 0.1 KU/L
SILVER BIRCH IGE QN: 0.18 KU/L
WEST RAGWEED IGE QN: 0.48 KU/L
WHITE ASH IGE QN: 3.29 KU/L
WHITE ELM IGE QN: 0.44 KU/L
WHITE OAK IGE QN: 0.18 KU/L

## 2021-09-03 LAB
A-LACTALB IGE QN: 0.36 KU/L
CASEIN IGE QN: <0.1 KU/L
CHEDDAR IGE QN: <0.1 KU/L
CHEESE MOLD IGE QN: 0.22 KU/L
CONV CLASS DESCRIPTION: ABNORMAL
COW MILK BOILED IGE QN: <0.1 KU/L
COW MILK IGE QN: 0.45 KU/L
EGG WHITE IGE QN: <0.1 KU/L
EGG YOLK IGE QN: <0.1 KU/L
WHOLE EGG IGE QN: <0.1 KU/L

## 2021-10-18 DIAGNOSIS — M79.7 FIBROMYALGIA: ICD-10-CM

## 2021-10-18 DIAGNOSIS — Z00.00 ANNUAL PHYSICAL EXAM: ICD-10-CM

## 2021-10-18 DIAGNOSIS — E55.9 VITAMIN D DEFICIENCY: ICD-10-CM

## 2021-10-18 DIAGNOSIS — Z12.31 VISIT FOR SCREENING MAMMOGRAM: ICD-10-CM

## 2021-10-18 DIAGNOSIS — K90.0 CELIAC DISEASE: ICD-10-CM

## 2021-10-18 DIAGNOSIS — E53.8 VITAMIN B12 DEFICIENCY: ICD-10-CM

## 2021-10-18 DIAGNOSIS — G60.9 IDIOPATHIC PERIPHERAL NEUROPATHY: ICD-10-CM

## 2021-10-18 LAB
ALBUMIN SERPL-MCNC: 4.3 G/DL (ref 3.5–5.2)
ALP BLD-CCNC: 103 U/L (ref 35–104)
ALT SERPL-CCNC: 17 U/L (ref 5–33)
ANION GAP SERPL CALCULATED.3IONS-SCNC: 13 MMOL/L (ref 7–19)
AST SERPL-CCNC: 24 U/L (ref 5–32)
BACTERIA: ABNORMAL /HPF
BASOPHILS ABSOLUTE: 0 K/UL (ref 0–0.2)
BASOPHILS RELATIVE PERCENT: 0.4 % (ref 0–1)
BILIRUB SERPL-MCNC: 0.6 MG/DL (ref 0.2–1.2)
BILIRUBIN URINE: NEGATIVE
BLOOD, URINE: NEGATIVE
BUN BLDV-MCNC: 9 MG/DL (ref 6–20)
CALCIUM SERPL-MCNC: 9.8 MG/DL (ref 8.6–10)
CHLORIDE BLD-SCNC: 105 MMOL/L (ref 98–111)
CHOLESTEROL, TOTAL: 175 MG/DL (ref 160–199)
CLARITY: CLEAR
CO2: 24 MMOL/L (ref 22–29)
COLOR: YELLOW
CREAT SERPL-MCNC: 0.9 MG/DL (ref 0.5–0.9)
CRYSTALS, UA: ABNORMAL /HPF
EOSINOPHILS ABSOLUTE: 0.2 K/UL (ref 0–0.6)
EOSINOPHILS RELATIVE PERCENT: 4.1 % (ref 0–5)
EPITHELIAL CELLS, UA: ABNORMAL /HPF
GFR AFRICAN AMERICAN: >59
GFR NON-AFRICAN AMERICAN: >60
GLUCOSE BLD-MCNC: 85 MG/DL (ref 74–109)
GLUCOSE URINE: NEGATIVE MG/DL
HBA1C MFR BLD: 5.5 % (ref 4–6)
HCT VFR BLD CALC: 49.6 % (ref 37–47)
HDLC SERPL-MCNC: 42 MG/DL (ref 65–121)
HEMOGLOBIN: 14.8 G/DL (ref 12–16)
IMMATURE GRANULOCYTES #: 0 K/UL
KETONES, URINE: ABNORMAL MG/DL
LDL CHOLESTEROL CALCULATED: 111 MG/DL
LEUKOCYTE ESTERASE, URINE: ABNORMAL
LYMPHOCYTES ABSOLUTE: 1.5 K/UL (ref 1.1–4.5)
LYMPHOCYTES RELATIVE PERCENT: 27.6 % (ref 20–40)
MCH RBC QN AUTO: 27.4 PG (ref 27–31)
MCHC RBC AUTO-ENTMCNC: 29.8 G/DL (ref 33–37)
MCV RBC AUTO: 91.7 FL (ref 81–99)
MONOCYTES ABSOLUTE: 0.3 K/UL (ref 0–0.9)
MONOCYTES RELATIVE PERCENT: 6.1 % (ref 0–10)
NEUTROPHILS ABSOLUTE: 3.4 K/UL (ref 1.5–7.5)
NEUTROPHILS RELATIVE PERCENT: 61.6 % (ref 50–65)
NITRITE, URINE: NEGATIVE
PDW BLD-RTO: 13.7 % (ref 11.5–14.5)
PH UA: 6.5 (ref 5–8)
PLATELET # BLD: 257 K/UL (ref 130–400)
PMV BLD AUTO: 10.2 FL (ref 9.4–12.3)
POTASSIUM SERPL-SCNC: 4.4 MMOL/L (ref 3.5–5)
PROTEIN UA: NEGATIVE MG/DL
RBC # BLD: 5.41 M/UL (ref 4.2–5.4)
RBC UA: ABNORMAL /HPF (ref 0–2)
SODIUM BLD-SCNC: 142 MMOL/L (ref 136–145)
SPECIFIC GRAVITY UA: 1.02 (ref 1–1.03)
T4 FREE: 1.07 NG/DL (ref 0.93–1.7)
TOTAL PROTEIN: 7.6 G/DL (ref 6.6–8.7)
TRIGL SERPL-MCNC: 112 MG/DL (ref 0–149)
TSH SERPL DL<=0.05 MIU/L-ACNC: 2.12 UIU/ML (ref 0.27–4.2)
UROBILINOGEN, URINE: 0.2 E.U./DL
VITAMIN B-12: 1256 PG/ML (ref 211–946)
VITAMIN D 25-HYDROXY: 60.7 NG/ML
WBC # BLD: 5.6 K/UL (ref 4.8–10.8)
WBC UA: ABNORMAL /HPF (ref 0–5)

## 2021-10-21 ENCOUNTER — OFFICE VISIT (OUTPATIENT)
Dept: INTERNAL MEDICINE | Age: 56
End: 2021-10-21
Payer: COMMERCIAL

## 2021-10-21 VITALS
DIASTOLIC BLOOD PRESSURE: 88 MMHG | BODY MASS INDEX: 27.94 KG/M2 | SYSTOLIC BLOOD PRESSURE: 122 MMHG | RESPIRATION RATE: 18 BRPM | HEART RATE: 104 BPM | WEIGHT: 178 LBS | OXYGEN SATURATION: 98 % | HEIGHT: 67 IN

## 2021-10-21 DIAGNOSIS — Z12.31 ENCOUNTER FOR SCREENING MAMMOGRAM FOR BREAST CANCER: ICD-10-CM

## 2021-10-21 DIAGNOSIS — E53.8 VITAMIN B12 DEFICIENCY: ICD-10-CM

## 2021-10-21 DIAGNOSIS — Z00.00 ANNUAL PHYSICAL EXAM: Primary | ICD-10-CM

## 2021-10-21 DIAGNOSIS — E55.9 VITAMIN D DEFICIENCY: ICD-10-CM

## 2021-10-21 DIAGNOSIS — E66.3 OVERWEIGHT (BMI 25.0-29.9): ICD-10-CM

## 2021-10-21 DIAGNOSIS — E78.00 PURE HYPERCHOLESTEROLEMIA: ICD-10-CM

## 2021-10-21 PROCEDURE — 99396 PREV VISIT EST AGE 40-64: CPT | Performed by: INTERNAL MEDICINE

## 2021-10-21 RX ORDER — CHOLECALCIFEROL (VITAMIN D3) 25 MCG
TABLET ORAL
COMMUNITY

## 2021-10-21 RX ORDER — UBIDECARENONE 75 MG
50 CAPSULE ORAL DAILY
COMMUNITY

## 2021-10-21 SDOH — ECONOMIC STABILITY: FOOD INSECURITY: WITHIN THE PAST 12 MONTHS, THE FOOD YOU BOUGHT JUST DIDN'T LAST AND YOU DIDN'T HAVE MONEY TO GET MORE.: NEVER TRUE

## 2021-10-21 SDOH — ECONOMIC STABILITY: FOOD INSECURITY: WITHIN THE PAST 12 MONTHS, YOU WORRIED THAT YOUR FOOD WOULD RUN OUT BEFORE YOU GOT MONEY TO BUY MORE.: NEVER TRUE

## 2021-10-21 ASSESSMENT — ENCOUNTER SYMPTOMS
WHEEZING: 0
BACK PAIN: 1
CHEST TIGHTNESS: 0
CONSTIPATION: 0
COUGH: 0
ABDOMINAL PAIN: 0
SORE THROAT: 0

## 2021-10-21 ASSESSMENT — SOCIAL DETERMINANTS OF HEALTH (SDOH): HOW HARD IS IT FOR YOU TO PAY FOR THE VERY BASICS LIKE FOOD, HOUSING, MEDICAL CARE, AND HEATING?: NOT HARD AT ALL

## 2021-10-21 NOTE — PROGRESS NOTES
Chief Complaint:   Ramona Boo is a 50 y.o. female who presents forcomplete physical exam.    History of Present Illness:      Ramona Boo is a 50 y.o. female who presents todayfor wellness visit AND follow up on her chronic medical conditions as noted below.     Patient Active Problem List    Diagnosis Date Noted    Celiac disease 10/04/2017     2001 dx per biopsy      Vitamin D deficiency 09/22/2017    Vitamin B12 deficiency 09/22/2017    Fibromyalgia 09/22/2017    Slow transit constipation 09/22/2017    Paresthesia 09/14/2016    Weakness 09/14/2016    Pain in both lower legs 09/14/2016    Idiopathic peripheral neuropathy 09/14/2016       Past Medical History:   Diagnosis Date    Arthralgia     Bradford's esophagus     Celiac disease     Depression     Fibromyalgia     Neuropathy     Rash     Rhinitis     Trochanteric tendinitis        Past Surgical History:   Procedure Laterality Date    CHOLECYSTECTOMY      COLONOSCOPY      GALLBLADDER SURGERY      NASAL ENDOSCOPY      TONSILLECTOMY         Current Outpatient Medications   Medication Sig Dispense Refill    vitamin B-12 (CYANOCOBALAMIN) 100 MCG tablet Take 50 mcg by mouth daily Indications: Every 3rd day      Cholecalciferol (VITAMIN D3) 25 MCG TABS Take by mouth Indications: Every 3rd day      famotidine (PEPCID) 20 MG tablet Take 20 mg by mouth 2 times daily      COLLAGEN PO Take 1,000 mg by mouth daily      azelastine (ASTELIN) 0.1 % nasal spray 1 spray by Nasal route 2 times daily Use in each nostril as directed      albuterol sulfate HFA (VENTOLIN HFA) 108 (90 Base) MCG/ACT inhaler Inhale 2 puffs into the lungs every 6 hours as needed for Wheezing      Fexofenadine-Pseudoephedrine (ALLEGRA-D 24 HOUR PO) Take by mouth      hydrOXYzine (ATARAX) 50 MG tablet Take 50 mg by mouth nightly       Magnesium 100 MG TABS Take 100 mg by mouth 2 times daily      gabapentin (NEURONTIN) 300 MG capsule Take 2 caps by mouth three daily. (Patient taking differently: 3 times daily. Take 2 caps by mouth three daily.) 540 capsule 4    Omega 3-6-9 CAPS Take by mouth      Multiple Vitamin (MULTI VITAMIN DAILY PO) Take by mouth      sucralfate (CARAFATE) 1 GM tablet DISSOLVE 1 TABLET INTO SLURRY THREE TIMES DAILY BEFORE MEALS  4    triamcinolone (NASACORT ALLERGY 24HR) 55 MCG/ACT nasal inhaler 2 sprays by Nasal route daily       No current facility-administered medications for this visit. Allergies   Allergen Reactions    Doxepin Itching    Augmentin [Amoxicillin-Pot Clavulanate]     Bactrim [Sulfamethoxazole-Trimethoprim]     Factive [Gemifloxacin]     Gluten Meal     Glutethimides     Proton Pump Inhibitors        Social History     Socioeconomic History    Marital status:      Spouse name: None    Number of children: None    Years of education: None    Highest education level: None   Occupational History    None   Tobacco Use    Smoking status: Never Smoker    Smokeless tobacco: Never Used   Vaping Use    Vaping Use: Never used   Substance and Sexual Activity    Alcohol use: No    Drug use: No    Sexual activity: Yes   Other Topics Concern    None   Social History Narrative    None     Social Determinants of Health     Financial Resource Strain: Low Risk     Difficulty of Paying Living Expenses: Not hard at all   Food Insecurity: No Food Insecurity    Worried About Running Out of Food in the Last Year: Never true    Haritha of Food in the Last Year: Never true   Transportation Needs:     Lack of Transportation (Medical):      Lack of Transportation (Non-Medical):    Physical Activity:     Days of Exercise per Week:     Minutes of Exercise per Session:    Stress:     Feeling of Stress :    Social Connections:     Frequency of Communication with Friends and Family:     Frequency of Social Gatherings with Friends and Family:     Attends [employer] Services:     Active Member of Clubs or Organizations:  Attends Club or Organization Meetings:     Marital Status:    Intimate Partner Violence:     Fear of Current or Ex-Partner:     Emotionally Abused:     Physically Abused:     Sexually Abused:      Family History   Problem Relation Age of Onset    Crohn's Disease Son           Past Surgical History:   Procedure Laterality Date    CHOLECYSTECTOMY      COLONOSCOPY      GALLBLADDER SURGERY      NASAL ENDOSCOPY      TONSILLECTOMY           Lab Review   Orders Only on 10/18/2021   Component Date Value    Vitamin B-12 10/18/2021 1256*    Vit D, 25-Hydroxy 10/18/2021 60.7     T4 Free 10/18/2021 1.07     TSH 10/18/2021 2.120     Color, UA 10/18/2021 YELLOW     Clarity, UA 10/18/2021 Clear     Glucose, Ur 10/18/2021 Negative     Bilirubin Urine 10/18/2021 Negative     Ketones, Urine 10/18/2021 TRACE*    Specific [place], UA 10/18/2021 1.022     Blood, Urine 10/18/2021 Negative     pH, UA 10/18/2021 6.5     Protein, UA 10/18/2021 Negative     Urobilinogen, Urine 10/18/2021 0.2     Nitrite, Urine 10/18/2021 Negative     Leukocyte Esterase, Urine 10/18/2021 SMALL*    Cholesterol, Total 10/18/2021 175     Triglycerides 10/18/2021 112     HDL 10/18/2021 42*    LDL Calculated 10/18/2021 111     Hemoglobin A1C 10/18/2021 5.5     Sodium 10/18/2021 142     Potassium 10/18/2021 4.4     Chloride 10/18/2021 105     CO2 10/18/2021 24     Anion Gap 10/18/2021 13     Glucose 10/18/2021 85     BUN 10/18/2021 9     CREATININE 10/18/2021 0.9     GFR Non- 10/18/2021 >60     GFR  10/18/2021 >59     Calcium 10/18/2021 9.8     Total Protein 10/18/2021 7.6     Albumin 10/18/2021 4.3     Total Bilirubin 10/18/2021 0.6     Alkaline Phosphatase 10/18/2021 103     ALT 10/18/2021 17     AST 10/18/2021 24     WBC 10/18/2021 5.6     RBC 10/18/2021 5.41*    Hemoglobin 10/18/2021 14.8     Hematocrit 10/18/2021 49.6*    MCV 10/18/2021 91.7     MCH 10/18/2021 27.4     MCHC 10/18/2021 29.8*    RDW 10/18/2021 13.7     Platelets 12/06/6707 257     MPV 10/18/2021 10.2     Neutrophils % 10/18/2021 61.6     Lymphocytes % 10/18/2021 27.6     Monocytes % 10/18/2021 6.1     Eosinophils % 10/18/2021 4.1     Basophils % 10/18/2021 0.4     Neutrophils Absolute 10/18/2021 3.4     Immature Granulocytes # 10/18/2021 0.0     Lymphocytes Absolute 10/18/2021 1.5     Monocytes Absolute 10/18/2021 0.30     Eosinophils Absolute 10/18/2021 0.20     Basophils Absolute 10/18/2021 0.00     WBC, UA 10/18/2021 2-4     RBC, UA 10/18/2021 2-4     Epithelial Cells, UA 10/18/2021 0-2     Bacteria, UA 10/18/2021 None Seen*    Crystals, UA 10/18/2021 Few Ca. Oxalate*         Review of Systems   Constitutional: Positive for fatigue. Negative for chills and fever. HENT: Negative for congestion, ear pain, nosebleeds, postnasal drip and sore throat. Respiratory: Negative for cough, chest tightness and wheezing. Cardiovascular: Negative for chest pain, palpitations and leg swelling. Gastrointestinal: Negative for abdominal pain and constipation. Genitourinary: Negative for dysuria and urgency. Musculoskeletal: Positive for arthralgias, back pain and neck pain. Skin: Negative for rash. Neurological: Positive for numbness. Negative for dizziness and headaches. Psychiatric/Behavioral: Positive for sleep disturbance. Vitals:    10/21/21 1335   BP: 122/88   Site: Left Upper Arm   Position: Sitting   Cuff Size: Large Adult   Pulse: 104   Resp: 18   SpO2: 98%   Weight: 178 lb (80.7 kg)   Height: 5' 7\" (1.702 m)      Wt Readings from Last 3 Encounters:   10/21/21 178 lb (80.7 kg)   04/22/21 183 lb (83 kg)   03/31/21 182 lb (82.6 kg)   Body mass index is 27.88 kg/m². BP Readings from Last 3 Encounters:   10/21/21 122/88   04/22/21 118/82   03/31/21 130/88       Physical Exam  Constitutional:       Appearance: She is well-developed.    HENT:      Right Ear: External ear normal. Left Ear: External ear normal.      Mouth/Throat:      Pharynx: No oropharyngeal exudate. Eyes:      Conjunctiva/sclera: Conjunctivae normal.      Pupils: Pupils are equal, round, and reactive to light. Neck:      Thyroid: No thyromegaly. Vascular: No JVD. Cardiovascular:      Rate and Rhythm: Normal rate. Heart sounds: Normal heart sounds. No murmur heard. Pulmonary:      Effort: No respiratory distress. Breath sounds: Normal breath sounds. No wheezing or rales. Chest:      Chest wall: No tenderness. Abdominal:      General: Bowel sounds are normal.      Palpations: Abdomen is soft. Musculoskeletal:         General: Normal range of motion. Cervical back: Neck supple. Lymphadenopathy:      Cervical: No cervical adenopathy. Skin:     General: Skin is warm. Findings: No rash. Neurological:      Mental Status: She is oriented to person, place, and time. Breast exam  Bilateral breast exam- symmetric, no nodules, no lymphadenopathy, no nipple discharge            ASSESSMENT/PLAN    Annual physical exam  *Screening mammogram, encounter for- schedule  * pap 2018 repeat 3 yrs- wants to wait until 2022 fall  * cscope 1/2015 repeat 5 yrs?  Vs 10 yrs = pt will DW dr Mesha Branham 111 in 10/2021 (102 (110 (116) ( 99)- low fat diet  * a1c 5.5 ( 5.5 )  * TFT's normal     Vitamin B12 deficiency- her level is  1256 in 10/2021 (2280 ( 3449 ( 1389 )( 2000)( 883)- 1  B12 dose  1 mg every Monday Wednesday and Friday        Vitamin D deficiency-   vit d level is 60 in 10/2021 (39 ( 35 (35.8) (51)   Cont vit d -  take 2000- 3000 + also 800 with her MVI daily     Idiopathic peripheral neuropathy-   Complex symptoms that involve paresthesias, episodes or weakness, pain in lower legs-patient follows with neurology takes TID neurontin     Celiac disease  RLS  Now sx better controlled  H&H normal 15.5 in 10/2020  Chronic reflux esophagitis  * has appt to see GI dr Devi Felix-   per ENT needs to RO GERD  Patient states that the plan would be to proceed with EGD     Elevated antinuclear antibody (RUSTAM) level  Elevated RUSTAM titer nucleolar pattern  Additional testing dsDNA,SHAWN-1 antibody, SCL 70 antibody, Smith antibody Sjogren's a and B, ribonucleic protein antibody and antihistone antibodies all negative/normal       Orders Placed This Encounter   Procedures    LUIS A DIGITAL SCREEN W OR WO CAD BILATERAL    CBC Auto Differential    Comprehensive Metabolic Panel    Hemoglobin A1C    Lipid Panel    Vitamin D 25 Hydroxy    Urinalysis    TSH without Reflex    Vitamin B12     New Prescriptions    No medications on file      There are no Patient Instructions on file for this visit. No follow-ups on file. EMR Dragon/transcription disclaimer:Significant part of this  encounter note is electronic transcription/translation of spoken language to printed text. The electronic translation of spoken language may beerroneous, or at times, nonsensical words or phrases may be inadvertently transcribed.  Although I have reviewed the note for such errors, some may still exist.

## 2021-11-25 NOTE — PROGRESS NOTES
Kay Daigle is a 50 y.o.  with history of menorrhagia who presents today for endometrial biopsy. /81 (Position: Sitting)   Pulse 87   Ht 5' 6.5\" (1.689 m)   Wt 161 lb (73 kg)   BMI 25.60 kg/m²     Endometrial Biopsy Procedure Note    Pre-operative Diagnosis: PMB    Post-operative Diagnosis: same    Indications: postmenopausal bleeding    Procedure Details    Urine pregnancy test was not done. The risks (including infection, bleeding, pain, and uterine perforation) and benefits of the procedure were explained to the patient and Written informed consent was obtained. The patient was placed in the dorsal lithotomy position. Speculum inserted in the vagina, and the cervix prepped with povidone iodine. Single tooth tenaculum applied to anterior cervical lip. Uterus was sounded to 8cm. Pipelle endometrial aspirator was inserted and gentle pressure applied. Adequate tissue sample obtained. Sent for pathology. Pt tolerated well. Condition:  Stable    Complications:  None    Plan:    The patient was advised to call for any fever or for prolonged or severe pain or bleeding. She was advised to use OTC ibuprofen as needed for mild to moderate pain. She was advised to avoid vaginal intercourse for 48 hours or until the bleeding has completely stopped. No

## 2022-03-02 NOTE — PROGRESS NOTES
YOB: 1965  Location: [facility]  Location Address: [address] [place] [address], [facility], [address] [place] [address]  Location Phone: [phone]    Chief Complaint   Patient presents with   • Follow-up     Lymph node        History of Present Illness  Lizbeth iRvera is a 56 y.o. female.  Lizbeth Rivera is here for follow up of ENT complaints. The patient has had problems with sinus problems and allergy problems  The symptoms are not localized to a particular location. The patient has had mild to moderate symptoms. The symptoms have been present for the last several years The symptoms are aggravated by  weather change. There have been no factors that have improved the symptoms.    Patient states she has had some ongoing allergy/sinus symptoms   She took her allergy test results to Dr. Monsalve and was told she was not a candidate for immunotherapy. He told her she did have a deviated septum and enlarged turbinates     She eliminated milk/dairy for 6 weeks. She continues milk and yogurt elimination, but is consuming some cheese at this point   Patient is consistently taking flonase and astelin   She tried to stop her allegra but states she was miserable when she stopped it and Dr. Monsalve told her to restart it. She feels as if she stops it she develops a sinus infection.   She has not tried singulair in the past.     She is unable to tolerate ppis and is on pepcid and is on sucralfate daily     Patient has tried a bite block for her tmj, but was unable to tolerate     Allergens (9) Dairy / Egg (2021 13:28)    Allergens (22) Foods (2021 13:28)    Allergens (19) (2021 13:28)    Allergens (21) Pollens (2021 13:28)        Past Medical History:   Diagnosis Date   • Abnormal ECG    • Asthma     some form of    • Colin's esophagus    • Celiac disease    • Joint pain    • Neuropathy    • Polyneuropathy    • Seasonal allergies    • Sinus headache        Past Surgical History:   Procedure  Laterality Date   • CERVICAL LYMPH NODE BIOPSY/EXCISION Right 5/10/2021    Procedure: Excisional biopsy right level II cervical lymph node;  Surgeon: Christopher Hurd MD;  Location: [facility];  Service: ENT;  Laterality: Right;   • CHOLECYSTECTOMY     • TONSILLECTOMY         Outpatient Medications Marked as Taking for the 3/3/22 encounter (Office Visit) with Christopher Hurd MD   Medication Sig Dispense Refill   • albuterol sulfate  (90 Base) MCG/ACT inhaler albuterol sulfate HFA 90 mcg/actuation aerosol inhaler   INHALE 2 PUFFS EVERY 4 6 HOURS AS NEEDED FOR COUGH,WHEEZE, SHORTNESS OF BREATH AND CHEST TIGHTNESS     • azelastine (ASTELIN) 0.1 % nasal spray SPRAY 1 SPRAY INTO EACH NOSTRIL TWICE A DAY     • B Complex Vitamins (B COMPLEX 1 PO) Take 1 tablet by mouth Daily.     • clindamycin (CLEOCIN T) 1 % external solution clindamycin phosphate 1 % topical solution   APPLY ONCE DAILY TO FACE AFTER SHOWER     • COLLAGEN-BORON-HYALURONIC ACID PO Take 1 tablet by mouth Daily.     • EPINEPHrine (EPIPEN 2-FOSTER) 0.3 MG/0.3ML solution auto-injector injection EpiPen     • famotidine (PEPCID) 20 MG tablet Take 20 mg by mouth 2 (Two) Times a Day.     • fexofenadine-pseudoephedrine (ALLEGRA-D 24) 180-240 MG per 24 hr tablet Take 1 tablet by mouth Daily.     • gabapentin (NEURONTIN) 300 MG capsule Take 300 mg by mouth Daily.     • gabapentin (NEURONTIN) 600 MG tablet 600 mg 2 (Two) Times a Day.     • guaiFENesin (Mucinex) 600 MG 12 hr tablet      • hydrOXYzine (ATARAX) 25 MG tablet TAKE 2 TABLETS BY MOUTH AT BEDTIME AS NEEDED     • hydrOXYzine (ATARAX) 50 MG tablet Take 50 mg by mouth Every Night.     • MAGNESIUM PO Take 100 mg by mouth 2 (Two) Times a Day.     • MULTIPLE VITAMIN PO Take 1 tablet by mouth Daily.     • OMEGA 3-6-9 FATTY ACIDS PO Take 1 tablet by mouth Daily.     • Saline 0.9 % aerosol solution into the nostril(s) as directed by provider.     • Selenium (Selenicaps-200) 200 MCG capsule      •  sucralfate (CARAFATE) 1 g tablet TAKE 1 TABLET BY MOUTH ON AN EMPTY STOMACH THREE TIMES A DAY BEFORE MEALS ORALLY 30 DAY(S)  5   • Triamcinolone Acetonide (NASACORT ALLERGY 24HR) 55 MCG/ACT nasal inhaler into the nostril(s) as directed by provider.     • vitamin B-12 (CYANOCOBALAMIN) 1000 MCG tablet Take  by mouth Daily.     • VITAMIN D, CHOLECALCIFEROL, PO Take 1 tablet by mouth Daily.     • [DISCONTINUED] Allegra-D Allergy & Congestion  MG per 12 hr tablet TAKE 1 TABLET ONCE OR TWICE DAILY     • [DISCONTINUED] [facility] ALLERGY RELIEF-D12 5-120 MG per 12 hr tablet TAKE ONE TWICE A DAY AS NEEDED  2   • [DISCONTINUED] fexofenadine-pseudoephedrine (Allegra-D Allergy & Congestion)  MG per 12 hr tablet Allegra-D 12 Hour 60 mg-120 mg tablet,extended release   TAKE 1 TABLET ONCE OR TWICE DAILY         Amoxicillin-pot clavulanate, Clarithromycin, Doxepin, Sulfamethoxazole-trimethoprim, Gluten meal, Glutethimides, Gemifloxacin, Latex, and Proton pump inhibitors    Family History   Problem Relation Age of Onset   • Diabetes Maternal Grandmother    • Diabetes Maternal Grandfather        Social History     Socioeconomic History   • Marital status:    Tobacco Use   • Smoking status: Never Smoker   • Smokeless tobacco: Never Used   Vaping Use   • Vaping Use: Never used   Substance and Sexual Activity   • Alcohol use: No   • Drug use: No   • Sexual activity: Defer       Review of Systems   Constitutional: Negative.    HENT: Positive for congestion. Negative for trouble swallowing.         Admits ongoing daily reflux      Eyes: Negative.    Respiratory: Negative.    Cardiovascular: Negative.    Genitourinary: Negative.    Allergic/Immunologic: Positive for environmental allergies.       Vitals:    03/03/22 1033   BP: 135/74   Pulse: 80   Resp: 20   Temp: 98 °F (36.7 °C)       Body mass index is 28.25 kg/m².    Objective     Physical Exam  Vitals reviewed.   Constitutional:       Appearance: She is normal weight.    HENT:      Head: Normocephalic.      Right Ear: Hearing, tympanic membrane, ear canal and external ear normal.      Left Ear: Hearing, tympanic membrane, ear canal and external ear normal.      Nose: Septal deviation present.      Comments: Bilateral nasal crusting        Mouth/Throat:      Lips: Pink.      Mouth: Mucous membranes are moist.      Pharynx: Uvula midline.   Musculoskeletal:      Cervical back: Full passive range of motion without pain and normal range of motion.   Lymphadenopathy:      Cervical: No cervical adenopathy.   Neurological:      Mental Status: She is alert.         Assessment/Plan   Diagnoses and all orders for this visit:    1. Chronic allergic rhinitis (Primary)    2. TMJ dysfunction    3. Nasal congestion    4. Lymphadenopathy of head and neck    5. Nasal septal deviation    6. Laryngopharyngeal reflux    Other orders  -     mupirocin (BACTROBAN) 2 % ointment; Apply 1 application topically to the appropriate area as directed 2 (Two) Times a Day for 14 days.  Dispense: 22 g; Refill: 0  -     pseudoephedrine (Sudafed) 30 MG tablet; Take 1 tablet by mouth Every 6 (Six) Hours As Needed for Congestion for up to 30 days.  Dispense: 30 tablet; Refill: 3      * Surgery not found *  No orders of the defined types were placed in this encounter.    Return in about 6 months (around 9/3/2022) for Recheck.     Continue nasal sprays   Use pepcid as discussed   Stop long term use of allegra and allegra d   Can use otc sudafed as needed   Continue elimination of milk/dairy   Keep head of bed elevated     Can discuss posterior nasal nerve ablation if symptoms persist     Patient Instructions   CONTACT INFORMATION:  The main office phone number is [phone]. For emergencies after hours and on weekends, this number will convert over to our answering service and the on call provider will answer. Please try to keep non emergent phone calls/ questions to office hours 9am-5pm Monday through Friday.       Profig  As an alternative, you can sign up and use the Epic MyChart system for more direct and quicker access for non emergent questions/ problems.  [facility] Profig allows you to send messages to your doctor, view your test results, renew your prescriptions, schedule appointments, and more. To sign up, go to [employer] and click on the Sign Up Now link in the New User? box. Enter your Profig Activation Code exactly as it appears below along with the last four digits of your Social Security Number and your Date of Birth () to complete the sign-up process. If you do not sign up before the expiration date, you must request a new code.     Profig Activation Code: Activation code not generated  Current Profig Status: Active     If you have questions, you can email [email or web address] or call [phone] to talk to our Profig staff. Remember, Profig is NOT to be used for urgent needs. For medical emergencies, dial 911.     IF YOU SMOKE OR USE TOBACCO PLEASE READ THE FOLLOWING:  Why is smoking bad for me?  Smoking increases the risk of heart disease, lung disease, vascular disease, stroke, and cancer. If you smoke, STOP!        IF YOU SMOKE OR USE TOBACCO PLEASE READ THE FOLLOWING:  Why is smoking bad for me?  Smoking increases the risk of heart disease, lung disease, vascular disease, stroke, and cancer. If you smoke, STOP!     For more information:  Quit Now [place]  -QUIT-NOW  [email or web address]    Gastroesophageal Reflux Disease (Laryngopharyngeal Reflux), Adult  Gastroesophageal reflux disease (GERD) and/or Laryngopharyngeal Reflux, (LPR) happens when acid from your stomach flows up into the esophagus and/or throat and voicebox or larynx. When acid comes in contact with the these organs, the acid can cause soreness (inflammation). Over time, GERD may create small holes (ulcers) in the lining of the esophagus and may lead to the development of  hoarseness, difficulty swallowing,   feeling of something stuck in the throat, increased mucous or drainage and even predispose to the development of malignancies, (cancer).    CAUSES   · Increased body weight. This puts pressure on the stomach, making acid rise from the stomach into the esophagus.  · Smoking. This increases acid production in the stomach.  · Drinking alcohol. This causes decreased pressure in the lower esophageal sphincter (valve or ring of muscle between the esophagus and stomach), allowing acid from the stomach into the esophagus.  · Late evening meals and a full stomach. This increases pressure and acid production in the stomach.  · A malformed lower esophageal sphincter  · Diet which can include avoidance of gluten and dairy products  · Age  SYMPTOMS   · Burning pain in the lower part of the mid-chest behind the breastbone and in the mid-stomach area. This may occur twice a week or more often.  · Trouble swallowing.  · Sore throat.  · Dry cough.  · Asthma-like symptoms including chest tightness, shortness of breath, or wheezing.  · Globus sensation-something stuck in the throat/fullness  · Hoarseness  DIAGNOSIS   Your caregiver may be able to diagnose GERD based on your symptoms. In some cases, X-rays and other tests may be done to check for complications or to check the condition of your stomach and esophagus.  You may need to see another doctor.  TREATMENT   Over-the-counter or prescription medicines to help decrease acid production.   Dietary and behavioral modifications or changes may be also recommended.  HOME CARE INSTRUCTIONS   · Change the factors that you can control. Ask your caregiver for guidance concerning weight loss, quitting smoking, and alcohol consumption.  · Avoid foods and drinks that make your symptoms worse, and MAY include such as:  ¨ Caffeine or alcoholic drinks.  ¨ Chocolate.  ¨ Gluten containing foods  ¨ Dairy  ¨ Peppermint or mint flavorings.  ¨ Garlic and  onions.  ¨ Spicy foods.  ¨ Citrus fruits, such as oranges, oriana, or limes.  ¨ Tomato-based foods such as sauce, chili, salsa, and pizza.  ¨ Fried and fatty foods.  · Avoid lying down for the 3 hours prior to your bedtime or prior to taking a nap.  · Eat small, frequent meals instead of large meals.  · Wear loose-fitting clothing. Do not wear anything tight around your waist that causes pressure on your stomach.  · Raise the head of your bed 6 to 8 inches with wood blocks to help you sleep. Extra pillows will not help.  · Only take over-the-counter or prescription medicines for pain, discomfort, or fever as directed by your caregiver.  · Do not take aspirin, ibuprofen, or other nonsteroidal anti-inflammatory drugs if possible (NSAIDs).  SEEK IMMEDIATE MEDICAL CARE IF:   · You have pain in your arms, neck, jaw, teeth, or back.  · Your pain increases or changes in intensity or duration.  · You develop nausea, vomiting, or sweating (diaphoresis).  · You develop shortness of breath, or you faint.  · Your vomit is green, yellow, black, or looks like coffee grounds or blood.  · Your stool is red, bloody, or black.  These symptoms could be signs of other problems, such as heart disease, gastric bleeding, or esophageal bleeding.  MAKE SURE YOU:   · Understand these instructions.  · Will watch your condition.  · Will get help right away if you are not doing well or get worse.     This information is not intended to replace advice given to you by your physician. Make sure you discuss any questions you have with your health care provider.     Modified by Christopher Hurd MD, FACS 9/8/2016.  Document Released: 09/27/2006 Document Revised: 01/08/2016 Document Reviewed: 04/13/2016  Present Interactive Patient Education ©2016 [employer].    For the best response, use your nasal sprays every day without skipping doses. It may take several weeks before the full effect is acheived.   Patient was educated on findings of evaluation,  purpose of treatment, and goals for therapy.  Treatment options discussed and questions answered.  Patient was educated on exercises/self treatment/pain relief techniques. Patient was educated on soft food diet, eliminating chewing gum/hard candy, chewing on opposite side, decreased opening, avoidance of large yawning movements, avoid sleeping on involved side, over the counter bite guard and postural adjustments.

## 2022-03-03 ENCOUNTER — OFFICE VISIT (OUTPATIENT)
Dept: OTOLARYNGOLOGY | Facility: CLINIC | Age: 57
End: 2022-03-03

## 2022-03-03 VITALS
RESPIRATION RATE: 20 BRPM | BODY MASS INDEX: 28.56 KG/M2 | DIASTOLIC BLOOD PRESSURE: 74 MMHG | WEIGHT: 182 LBS | SYSTOLIC BLOOD PRESSURE: 135 MMHG | TEMPERATURE: 98 F | HEIGHT: 67 IN | HEART RATE: 80 BPM

## 2022-03-03 DIAGNOSIS — J34.2 NASAL SEPTAL DEVIATION: ICD-10-CM

## 2022-03-03 DIAGNOSIS — M26.609 TMJ DYSFUNCTION: ICD-10-CM

## 2022-03-03 DIAGNOSIS — R09.81 NASAL CONGESTION: ICD-10-CM

## 2022-03-03 DIAGNOSIS — J30.9 CHRONIC ALLERGIC RHINITIS: Primary | ICD-10-CM

## 2022-03-03 DIAGNOSIS — R59.1 LYMPHADENOPATHY OF HEAD AND NECK: ICD-10-CM

## 2022-03-03 DIAGNOSIS — K21.9 LARYNGOPHARYNGEAL REFLUX: ICD-10-CM

## 2022-03-03 PROCEDURE — 99214 OFFICE O/P EST MOD 30 MIN: CPT | Performed by: NURSE PRACTITIONER

## 2022-03-03 RX ORDER — PSEUDOEPHEDRINE HCL 30 MG
30 TABLET ORAL EVERY 6 HOURS PRN
Qty: 30 TABLET | Refills: 3 | Status: SHIPPED | OUTPATIENT
Start: 2022-03-03 | End: 2022-04-02

## 2022-03-03 NOTE — PATIENT INSTRUCTIONS
CONTACT INFORMATION:  The main office phone number is [phone]. For emergencies after hours and on weekends, this number will convert over to our answering service and the on call provider will answer. Please try to keep non emergent phone calls/ questions to office hours 9am-5pm Monday through Friday.      MeMed  As an alternative, you can sign up and use the Epic MyChart system for more direct and quicker access for non emergent questions/ problems.  [employer] allows you to send messages to your doctor, view your test results, renew your prescriptions, schedule appointments, and more. To sign up, go to [employer] and click on the Sign Up Now link in the New User? box. Enter your MeMed Activation Code exactly as it appears below along with the last four digits of your Social Security Number and your Date of Birth () to complete the sign-up process. If you do not sign up before the expiration date, you must request a new code.     MeMed Activation Code: Activation code not generated  Current MeMed Status: Active     If you have questions, you can email [email or web address] or call [phone] to talk to our MeMed staff. Remember, MeMed is NOT to be used for urgent needs. For medical emergencies, dial 911.     IF YOU SMOKE OR USE TOBACCO PLEASE READ THE FOLLOWING:  Why is smoking bad for me?  Smoking increases the risk of heart disease, lung disease, vascular disease, stroke, and cancer. If you smoke, STOP!        IF YOU SMOKE OR USE TOBACCO PLEASE READ THE FOLLOWING:  Why is smoking bad for me?  Smoking increases the risk of heart disease, lung disease, vascular disease, stroke, and cancer. If you smoke, STOP!     For more information:  Quit Now [place]  -QUIT-NOW  [email or web address]    Gastroesophageal Reflux Disease (Laryngopharyngeal Reflux), Adult  Gastroesophageal reflux disease (GERD) and/or Laryngopharyngeal Reflux, (LPR) happens  when acid from your stomach flows up into the esophagus and/or throat and voicebox or larynx. When acid comes in contact with the these organs, the acid can cause soreness (inflammation). Over time, GERD may create small holes (ulcers) in the lining of the esophagus and may lead to the development of hoarseness, difficulty swallowing,   feeling of something stuck in the throat, increased mucous or drainage and even predispose to the development of malignancies, (cancer).    CAUSES   · Increased body weight. This puts pressure on the stomach, making acid rise from the stomach into the esophagus.  · Smoking. This increases acid production in the stomach.  · Drinking alcohol. This causes decreased pressure in the lower esophageal sphincter (valve or ring of muscle between the esophagus and stomach), allowing acid from the stomach into the esophagus.  · Late evening meals and a full stomach. This increases pressure and acid production in the stomach.  · A malformed lower esophageal sphincter  · Diet which can include avoidance of gluten and dairy products  · Age  SYMPTOMS   · Burning pain in the lower part of the mid-chest behind the breastbone and in the mid-stomach area. This may occur twice a week or more often.  · Trouble swallowing.  · Sore throat.  · Dry cough.  · Asthma-like symptoms including chest tightness, shortness of breath, or wheezing.  · Globus sensation-something stuck in the throat/fullness  · Hoarseness  DIAGNOSIS   Your caregiver may be able to diagnose GERD based on your symptoms. In some cases, X-rays and other tests may be done to check for complications or to check the condition of your stomach and esophagus.  You may need to see another doctor.  TREATMENT   Over-the-counter or prescription medicines to help decrease acid production.   Dietary and behavioral modifications or changes may be also recommended.  HOME CARE INSTRUCTIONS   · Change the factors that you can control. Ask your caregiver  for guidance concerning weight loss, quitting smoking, and alcohol consumption.  · Avoid foods and drinks that make your symptoms worse, and MAY include such as:  ¨ Caffeine or alcoholic drinks.  ¨ Chocolate.  ¨ Gluten containing foods  ¨ Dairy  ¨ Peppermint or mint flavorings.  ¨ Garlic and onions.  ¨ Spicy foods.  ¨ Citrus fruits, such as oranges, oriana, or limes.  ¨ Tomato-based foods such as sauce, chili, salsa, and pizza.  ¨ Fried and fatty foods.  · Avoid lying down for the 3 hours prior to your bedtime or prior to taking a nap.  · Eat small, frequent meals instead of large meals.  · Wear loose-fitting clothing. Do not wear anything tight around your waist that causes pressure on your stomach.  · Raise the head of your bed 6 to 8 inches with wood blocks to help you sleep. Extra pillows will not help.  · Only take over-the-counter or prescription medicines for pain, discomfort, or fever as directed by your caregiver.  · Do not take aspirin, ibuprofen, or other nonsteroidal anti-inflammatory drugs if possible (NSAIDs).  SEEK IMMEDIATE MEDICAL CARE IF:   · You have pain in your arms, neck, jaw, teeth, or back.  · Your pain increases or changes in intensity or duration.  · You develop nausea, vomiting, or sweating (diaphoresis).  · You develop shortness of breath, or you faint.  · Your vomit is green, yellow, black, or looks like coffee grounds or blood.  · Your stool is red, bloody, or black.  These symptoms could be signs of other problems, such as heart disease, gastric bleeding, or esophageal bleeding.  MAKE SURE YOU:   · Understand these instructions.  · Will watch your condition.  · Will get help right away if you are not doing well or get worse.     This information is not intended to replace advice given to you by your physician. Make sure you discuss any questions you have with your health care provider.     Modified by Christopher Hurd MD, FACS 9/8/2016.  Document Released: 09/27/2006 Document Revised:  01/08/2016 Document Reviewed: 04/13/2016  [employer] Interactive Patient Education ©2016 [employer]    For the best response, use your nasal sprays every day without skipping doses. It may take several weeks before the full effect is acheived.   Patient was educated on findings of evaluation, purpose of treatment, and goals for therapy.  Treatment options discussed and questions answered.  Patient was educated on exercises/self treatment/pain relief techniques. Patient was educated on soft food diet, eliminating chewing gum/hard candy, chewing on opposite side, decreased opening, avoidance of large yawning movements, avoid sleeping on involved side, over the counter bite guard and postural adjustments.

## 2022-04-20 ENCOUNTER — HOSPITAL ENCOUNTER (OUTPATIENT)
Dept: WOMENS IMAGING | Age: 57
Discharge: HOME | End: 2022-04-20
Payer: COMMERCIAL

## 2022-04-20 DIAGNOSIS — Z12.31 ENCOUNTER FOR SCREENING MAMMOGRAM FOR BREAST CANCER: ICD-10-CM

## 2022-04-20 PROCEDURE — 77063 BREAST TOMOSYNTHESIS BI: CPT

## 2022-09-08 ENCOUNTER — APPOINTMENT (OUTPATIENT)
Dept: CT IMAGING | Facility: HOSPITAL | Age: 57
End: 2022-09-08

## 2022-09-08 ENCOUNTER — HOSPITAL ENCOUNTER (EMERGENCY)
Facility: HOSPITAL | Age: 57
Discharge: HOME | End: 2022-09-08
Attending: STUDENT IN AN ORGANIZED HEALTH CARE EDUCATION/TRAINING PROGRAM | Admitting: STUDENT IN AN ORGANIZED HEALTH CARE EDUCATION/TRAINING PROGRAM

## 2022-09-08 ENCOUNTER — APPOINTMENT (OUTPATIENT)
Dept: MRI IMAGING | Facility: HOSPITAL | Age: 57
End: 2022-09-08

## 2022-09-08 ENCOUNTER — APPOINTMENT (OUTPATIENT)
Dept: GENERAL RADIOLOGY | Facility: HOSPITAL | Age: 57
End: 2022-09-08

## 2022-09-08 VITALS
HEIGHT: 67 IN | TEMPERATURE: 97.7 F | DIASTOLIC BLOOD PRESSURE: 73 MMHG | HEART RATE: 66 BPM | BODY MASS INDEX: 27.94 KG/M2 | OXYGEN SATURATION: 98 % | WEIGHT: 178 LBS | SYSTOLIC BLOOD PRESSURE: 140 MMHG | RESPIRATION RATE: 18 BRPM

## 2022-09-08 DIAGNOSIS — R53.1 WEAKNESS: Primary | ICD-10-CM

## 2022-09-08 LAB
ABO GROUP BLD: NORMAL
ALBUMIN SERPL-MCNC: 3.9 G/DL (ref 3.5–5.2)
ALBUMIN/GLOB SERPL: 1.3 G/DL
ALP SERPL-CCNC: 101 U/L (ref 39–117)
ALT SERPL W P-5'-P-CCNC: 14 U/L (ref 1–33)
ANION GAP SERPL CALCULATED.3IONS-SCNC: 6 MMOL/L (ref 5–15)
AST SERPL-CCNC: 19 U/L (ref 1–32)
BASOPHILS # BLD AUTO: 0.02 10*3/MM3 (ref 0–0.2)
BASOPHILS NFR BLD AUTO: 0.2 % (ref 0–1.5)
BILIRUB SERPL-MCNC: 0.6 MG/DL (ref 0–1.2)
BILIRUB UR QL STRIP: NEGATIVE
BLD GP AB SCN SERPL QL: NEGATIVE
BUN SERPL-MCNC: 9 MG/DL (ref 6–20)
BUN/CREAT SERPL: 11.8 (ref 7–25)
CALCIUM SPEC-SCNC: 9.4 MG/DL (ref 8.6–10.5)
CHLORIDE SERPL-SCNC: 106 MMOL/L (ref 98–107)
CLARITY UR: CLEAR
CO2 SERPL-SCNC: 30 MMOL/L (ref 22–29)
COLOR UR: YELLOW
CREAT SERPL-MCNC: 0.76 MG/DL (ref 0.57–1)
DEPRECATED RDW RBC AUTO: 43 FL (ref 37–54)
EGFRCR SERPLBLD CKD-EPI 2021: 93.3 ML/MIN/1.73
EOSINOPHIL # BLD AUTO: 0.26 10*3/MM3 (ref 0–0.4)
EOSINOPHIL NFR BLD AUTO: 3 % (ref 0.3–6.2)
ERYTHROCYTE [DISTWIDTH] IN BLOOD BY AUTOMATED COUNT: 13.4 % (ref 12.3–15.4)
GLOBULIN UR ELPH-MCNC: 3.1 GM/DL
GLUCOSE BLDC GLUCOMTR-MCNC: 72 MG/DL (ref 70–130)
GLUCOSE SERPL-MCNC: 85 MG/DL (ref 65–99)
GLUCOSE UR STRIP-MCNC: NEGATIVE MG/DL
HCT VFR BLD AUTO: 45.4 % (ref 34–46.6)
HGB BLD-MCNC: 14.7 G/DL (ref 12–15.9)
HGB UR QL STRIP.AUTO: NEGATIVE
HOLD SPECIMEN: NORMAL
IMM GRANULOCYTES # BLD AUTO: 0.03 10*3/MM3 (ref 0–0.05)
IMM GRANULOCYTES NFR BLD AUTO: 0.3 % (ref 0–0.5)
INR PPP: 1.06 (ref 0.91–1.09)
KETONES UR QL STRIP: NEGATIVE
LEUKOCYTE ESTERASE UR QL STRIP.AUTO: NEGATIVE
LYMPHOCYTES # BLD AUTO: 2.36 10*3/MM3 (ref 0.7–3.1)
LYMPHOCYTES NFR BLD AUTO: 27.5 % (ref 19.6–45.3)
MAGNESIUM SERPL-MCNC: 2.3 MG/DL (ref 1.6–2.6)
MCH RBC QN AUTO: 28.4 PG (ref 26.6–33)
MCHC RBC AUTO-ENTMCNC: 32.4 G/DL (ref 31.5–35.7)
MCV RBC AUTO: 87.8 FL (ref 79–97)
MONOCYTES # BLD AUTO: 0.45 10*3/MM3 (ref 0.1–0.9)
MONOCYTES NFR BLD AUTO: 5.2 % (ref 5–12)
NEUTROPHILS NFR BLD AUTO: 5.46 10*3/MM3 (ref 1.7–7)
NEUTROPHILS NFR BLD AUTO: 63.8 % (ref 42.7–76)
NITRITE UR QL STRIP: NEGATIVE
NRBC BLD AUTO-RTO: 0 /100 WBC (ref 0–0.2)
PH UR STRIP.AUTO: 7 [PH] (ref 5–8)
PLATELET # BLD AUTO: 273 10*3/MM3 (ref 140–450)
PMV BLD AUTO: 9.5 FL (ref 6–12)
POTASSIUM SERPL-SCNC: 4 MMOL/L (ref 3.5–5.2)
PROT SERPL-MCNC: 7 G/DL (ref 6–8.5)
PROT UR QL STRIP: NEGATIVE
PROTHROMBIN TIME: 13.4 SECONDS (ref 11.9–14.6)
RBC # BLD AUTO: 5.17 10*6/MM3 (ref 3.77–5.28)
RH BLD: POSITIVE
SODIUM SERPL-SCNC: 142 MMOL/L (ref 136–145)
SP GR UR STRIP: >1.03 (ref 1–1.03)
T&S EXPIRATION DATE: NORMAL
UROBILINOGEN UR QL STRIP: ABNORMAL
WBC NRBC COR # BLD: 8.58 10*3/MM3 (ref 3.4–10.8)
WHOLE BLOOD HOLD COAG: NORMAL
WHOLE BLOOD HOLD COAG: NORMAL
WHOLE BLOOD HOLD SPECIMEN: NORMAL
WHOLE BLOOD HOLD SPECIMEN: NORMAL

## 2022-09-08 PROCEDURE — 99283 EMERGENCY DEPT VISIT LOW MDM: CPT

## 2022-09-08 PROCEDURE — 70496 CT ANGIOGRAPHY HEAD: CPT

## 2022-09-08 PROCEDURE — 86900 BLOOD TYPING SEROLOGIC ABO: CPT | Performed by: STUDENT IN AN ORGANIZED HEALTH CARE EDUCATION/TRAINING PROGRAM

## 2022-09-08 PROCEDURE — 0042T HC CT CEREBRAL PERFUSION W/WO CONTRAST: CPT

## 2022-09-08 PROCEDURE — 93005 ELECTROCARDIOGRAM TRACING: CPT

## 2022-09-08 PROCEDURE — 70450 CT HEAD/BRAIN W/O DYE: CPT

## 2022-09-08 PROCEDURE — 82962 GLUCOSE BLOOD TEST: CPT

## 2022-09-08 PROCEDURE — 86901 BLOOD TYPING SEROLOGIC RH(D): CPT | Performed by: STUDENT IN AN ORGANIZED HEALTH CARE EDUCATION/TRAINING PROGRAM

## 2022-09-08 PROCEDURE — 71045 X-RAY EXAM CHEST 1 VIEW: CPT

## 2022-09-08 PROCEDURE — 0 IOPAMIDOL PER 1 ML: Performed by: STUDENT IN AN ORGANIZED HEALTH CARE EDUCATION/TRAINING PROGRAM

## 2022-09-08 PROCEDURE — 85610 PROTHROMBIN TIME: CPT | Performed by: STUDENT IN AN ORGANIZED HEALTH CARE EDUCATION/TRAINING PROGRAM

## 2022-09-08 PROCEDURE — 81003 URINALYSIS AUTO W/O SCOPE: CPT | Performed by: STUDENT IN AN ORGANIZED HEALTH CARE EDUCATION/TRAINING PROGRAM

## 2022-09-08 PROCEDURE — 36415 COLL VENOUS BLD VENIPUNCTURE: CPT

## 2022-09-08 PROCEDURE — 85025 COMPLETE CBC W/AUTO DIFF WBC: CPT | Performed by: STUDENT IN AN ORGANIZED HEALTH CARE EDUCATION/TRAINING PROGRAM

## 2022-09-08 PROCEDURE — 80053 COMPREHEN METABOLIC PANEL: CPT | Performed by: STUDENT IN AN ORGANIZED HEALTH CARE EDUCATION/TRAINING PROGRAM

## 2022-09-08 PROCEDURE — 93010 ELECTROCARDIOGRAM REPORT: CPT | Performed by: INTERNAL MEDICINE

## 2022-09-08 PROCEDURE — 86850 RBC ANTIBODY SCREEN: CPT | Performed by: STUDENT IN AN ORGANIZED HEALTH CARE EDUCATION/TRAINING PROGRAM

## 2022-09-08 PROCEDURE — 83735 ASSAY OF MAGNESIUM: CPT | Performed by: STUDENT IN AN ORGANIZED HEALTH CARE EDUCATION/TRAINING PROGRAM

## 2022-09-08 PROCEDURE — 93005 ELECTROCARDIOGRAM TRACING: CPT | Performed by: STUDENT IN AN ORGANIZED HEALTH CARE EDUCATION/TRAINING PROGRAM

## 2022-09-08 PROCEDURE — 70498 CT ANGIOGRAPHY NECK: CPT

## 2022-09-08 PROCEDURE — 70551 MRI BRAIN STEM W/O DYE: CPT

## 2022-09-08 RX ORDER — SODIUM CHLORIDE 0.9 % (FLUSH) 0.9 %
10 SYRINGE (ML) INJECTION AS NEEDED
Status: DISCONTINUED | OUTPATIENT
Start: 2022-09-08 | End: 2022-09-08 | Stop reason: HOSPADM

## 2022-09-08 RX ADMIN — IOPAMIDOL 120 ML: 755 INJECTION, SOLUTION INTRAVENOUS at 15:50

## 2022-09-08 NOTE — ED PROVIDER NOTES
Subjective   PIT    History of Present Illness   Patient presents with right-sided weakness at the instruction of her doctor and her neurologist.  Yesterday she had an episode where she felt very lightheaded and sweaty on the toilet while having a bowel movement.  After that she noted right-sided weakness described as her arm being limp at her side and difficulty walking with her right leg.  She also notes right arm numbness/tingling but she has a history of chronic neuropathy and feels that this is not entirely out of keeping with her numbness and tingling which seems to come and go but she does feel that this numbness is significant.  She was seen in outside hospital where she was worked up for the near syncope and was ultimately discharged home with a diagnosis of TIA.  She called her doctor today because of the instructions given to her by the emergency physician but she continues to have right-sided upper extremity weakness which prompted her physician to direct her to the ER here today.  She said that she would not of presented if she was not instructed because this weakness is subtle and she did not even notice weakness in her legs but does admitted after our exam.  No pain.  No visual disturbance.  No headache.  No history of migraines although she did note a significant headache that lasted multiple days that occurred a few weeks ago.  No lightheadedness now.  Eating and drinking well.    Review of Systems   Constitutional: Negative for chills and fever.   HENT: Negative for congestion and sore throat.    Eyes: Negative for pain and redness.   Respiratory: Negative for cough and shortness of breath.    Cardiovascular: Negative for chest pain and palpitations.   Gastrointestinal: Negative for abdominal pain and vomiting.   Genitourinary: Negative for difficulty urinating and dysuria.   Musculoskeletal: Negative for gait problem and joint swelling.   Skin: Negative for rash and wound.   Neurological: Positive  for weakness. Negative for syncope and light-headedness.       Past Medical History:   Diagnosis Date   • Abnormal ECG    • Asthma     some form of    • Colin's esophagus    • Celiac disease    • Joint pain    • Neuropathy    • Polyneuropathy    • Seasonal allergies    • Sinus headache        Allergies   Allergen Reactions   • Amoxicillin-Pot Clavulanate Shortness Of Breath   • Clarithromycin Swelling     Facial swelling with rash   • Doxepin Itching   • Sulfamethoxazole-Trimethoprim Shortness Of Breath   • Gluten Meal GI Intolerance   • Glutethimides GI Intolerance   • Gemifloxacin Palpitations   • Latex Rash   • Proton Pump Inhibitors Palpitations       Past Surgical History:   Procedure Laterality Date   • CERVICAL LYMPH NODE BIOPSY/EXCISION Right 5/10/2021    Procedure: Excisional biopsy right level II cervical lymph node;  Surgeon: Christopher Hurd MD;  Location: [facility];  Service: ENT;  Laterality: Right;   • CHOLECYSTECTOMY     • TONSILLECTOMY         Family History   Problem Relation Age of Onset   • Diabetes Maternal Grandmother    • Diabetes Maternal Grandfather        Social History     Socioeconomic History   • Marital status:    Tobacco Use   • Smoking status: Never Smoker   • Smokeless tobacco: Never Used   Vaping Use   • Vaping Use: Never used   Substance and Sexual Activity   • Alcohol use: No   • Drug use: No   • Sexual activity: Defer           Objective   Physical Exam  Vitals reviewed.   Constitutional:       General: She is not in acute distress.  HENT:      Head: Normocephalic and atraumatic.   Eyes:      Extraocular Movements: Extraocular movements intact.      Conjunctiva/sclera: Conjunctivae normal.   Cardiovascular:      Pulses: Normal pulses.      Heart sounds: Normal heart sounds.   Pulmonary:      Effort: Pulmonary effort is normal. No respiratory distress.   Abdominal:      General: Abdomen is flat. There is no distension.   Musculoskeletal:      Cervical back: Normal  range of motion and neck supple.   Skin:     General: Skin is warm and dry.   Neurological:      General: No focal deficit present.      Mental Status: She is alert. Mental status is at baseline.      Comments: Right upper extremity: 5-/5 strength with handgrip and flexion/extension of shoulders, elbows.   Light touch sensation subjectively diminished when compared to the left upper extremity.  Left upper extremity: 5/5 strength with handgrip and flexion/extension of shoulders, elbows.   Light touch sensation intact and equal when compared to the right upper extremity.  Right lower extremity: 5-/5 strength with flexion/extension of hips, knees, and dorsi/plantarflexion of ankles. Able to wiggle toes.   Light touch sensation minimally subjectively diminished when compared to the left lower extremity.  Left lower extremity: 5/5 strength with flexion/extension of hips, knees, and dorsi/plantarflexion of ankles. Able to wiggle toes.   Light touch sensation intact and equal when compared to the right lower extremity.    Light sensation intact in bilateral face. CN 2-12 normal. FNF normal. Able to ambulate.   Psychiatric:         Behavior: Behavior normal.         Thought Content: Thought content normal.         Procedures           ED Course                                           MDM   Lizbeth Rivera is a 56 y.o. female with PMH above who presents to the Emergency Department with weakness, numbness and tingling.  She has a history of the numbness and tingling in various regions of her body but has never had the weakness before.  She has a story concerning for stroke although her NIH stroke scale is not 6 on arrival and she is out of the tPA window, doesn't qualify for code stroke; her neck stroke scale is a 2 based on her exam although her 5 - strength without marked drift on her right is notable on exam.  Will obtain stroke imaging and laboratory evaluation.     ED Course:   -Stroke imaging is negative.  Discussed  her case with the neurologist Garcia who recommends an MRI.  MRI was obtained and was negative for an acute process although we did discuss her chronic microvascular disease.  -Laboratory studies overall unremarkable.  -chest x-ray reviewed by me. no focal consolidations, no pulmonary edema, mediastinum not widened.  EKG reviewed by me; shows sinus rhythm, no focal ischemic changes, no arrhythmia.  -Urologist felt the patient could be discharged given the negative MRI.  I discussed this with the patient and offered admission due to the persistent weakness without a good explanation.  The patient does not want to stay in the hospital and strongly prefers to be discharged; she has a neurologist who would like to follow-up with.  I discussed with her that she can also follow-up with her neurologist.  She understands that her medical work-up is not complete however from an emergency standpoint it is reasonable for her to be discharged at this point in the absence of an emergent pathology being discovered on her work-up.  She ultimately declined admission and received strict return precautions to which she agreed.      Final diagnosis: right sided weakness    All questions answered. Patient/family was understanding and in agreement with today's assessment and plan. The patient was monitored during their stay in the ED and dispositioned without acute event.    Electronically signed by:  Jay Stockton MD 9/8/2022 21:27 CDT      Note: Dragon medical dictation software was used in the creation of this note.        Final diagnoses:   Weakness       ED Disposition  ED Disposition     ED Disposition   Discharge    Condition   Stable    Comment   --             [facility] NEUROLOGY  [address]  [address]  [facility] [address] [phone]             Medication List      No changes were made to your prescriptions during this visit.          Jay Stockton MD  09/08/22  [id number]

## 2022-09-09 NOTE — DISCHARGE INSTRUCTIONS
Please follow-up with your neurologist or our neurology department (Garcia) as soon as possible.  Please come back if your weakness worsens or if you develop any new concerning symptoms.

## 2022-09-12 ENCOUNTER — TELEPHONE (OUTPATIENT)
Dept: NEUROLOGY | Facility: OTHER | Age: 57
End: 2022-09-12

## 2022-09-12 LAB
QT INTERVAL: 394 MS
QTC INTERVAL: 449 MS

## 2022-09-12 NOTE — TELEPHONE ENCOUNTER
PATIENT CALLED REQUESTING TO SCHEDULE AN ER FOLLOW VISIT.  IN TALKING WITH THE PT SHE STATED SHE HAS A NEUROLOGIST CURRENTLY, DR. GRECO.  ADVISED THE PATIENT THAT WE WOULD NEED TO HAVE A REFERRAL  TO SEE HER FOR A TRANSFER OF CARE.

## 2022-10-18 ENCOUNTER — OFFICE VISIT (OUTPATIENT)
Dept: OTOLARYNGOLOGY | Facility: CLINIC | Age: 57
End: 2022-10-18

## 2022-10-18 VITALS
SYSTOLIC BLOOD PRESSURE: 137 MMHG | OXYGEN SATURATION: 100 % | HEIGHT: 67 IN | BODY MASS INDEX: 28.44 KG/M2 | DIASTOLIC BLOOD PRESSURE: 82 MMHG | TEMPERATURE: 98 F | HEART RATE: 90 BPM | WEIGHT: 181.2 LBS

## 2022-10-18 DIAGNOSIS — J30.9 CHRONIC ALLERGIC RHINITIS: ICD-10-CM

## 2022-10-18 DIAGNOSIS — Z87.19 HX OF PAROTITIS: ICD-10-CM

## 2022-10-18 DIAGNOSIS — R60.9 PAROTID SWELLING: ICD-10-CM

## 2022-10-18 DIAGNOSIS — M26.609 TMJ DYSFUNCTION: Primary | ICD-10-CM

## 2022-10-18 DIAGNOSIS — K21.9 LARYNGOPHARYNGEAL REFLUX: ICD-10-CM

## 2022-10-18 DIAGNOSIS — J34.2 NASAL SEPTAL DEVIATION: ICD-10-CM

## 2022-10-18 DIAGNOSIS — R09.81 NASAL CONGESTION: ICD-10-CM

## 2022-10-18 PROCEDURE — 99214 OFFICE O/P EST MOD 30 MIN: CPT | Performed by: NURSE PRACTITIONER

## 2022-10-18 RX ORDER — UBIDECARENONE 75 MG
50 CAPSULE ORAL
COMMUNITY

## 2022-10-18 RX ORDER — SELENIUM SULFIDE 2.5 MG/100ML
LOTION TOPICAL
COMMUNITY
Start: 2022-08-17 | End: 2023-02-17 | Stop reason: SDUPTHER

## 2022-10-18 RX ORDER — SELENIUM SULFIDE 2.5 MG/100ML
LOTION TOPICAL
COMMUNITY

## 2022-10-18 RX ORDER — ATORVASTATIN CALCIUM 20 MG/1
20 TABLET, FILM COATED ORAL
COMMUNITY
Start: 2022-09-14

## 2022-10-18 RX ORDER — ASPIRIN 81 MG/1
TABLET, CHEWABLE ORAL
COMMUNITY

## 2022-10-18 NOTE — PROGRESS NOTES
YOB: 1965  Location: [facility]  Location Address: [address] [place] [address], [facility] [address], [address] [place] [address]  Location Phone: [phone]    Chief Complaint   Patient presents with   • Follow-up     Pt is here for follow up. She is still having pain on right side of face.       History of Present Illness  Lizbeth Rivera is a 56 y.o. female.  Lizbeth Rivera is here for follow up of ENT complaints. The patient has had problems with sinus and allergy problems   Patient has had allergy testing in the past and was told by Dr. Monsalve she was not a candidate for immunotherapy. She was told at the time she had a deviated septum and enlarged turbinates.   She is on flonase and astelin as well as allegra, she states if she stops taking allegra she develops a sinus infection .   Patient has tried bite block in the past but was unable to tolerate    She continues to complain of ongoing allergy symptoms that are typically worse with season changes. She has had prick testing in the past, with significantly positive results     She has had several episodes of sinus infections since her last visit   Patient states she is constantly feeling as if she is choking on mucous  She has completely eliminated milk/dairy and states she has had some improvement in gi symptoms, but no improvement in allergy symptoms    patient also complains of parotid swelling that has been going on for quite some time as well as jaw pain that radiates down into her neck and into her ear.   She also complains of pain with eating especially sweet foods, this pain is located more into her upper jaw. She has been evaluated by dentist and said she does have evidence of tmj and that the enlargement of parotid area is likely due to that  She is also going to get a bite guard     Past Medical History:   Diagnosis Date   • Abnormal ECG    • Asthma     some form of    • Colin's esophagus    • Celiac disease    • Joint pain    • Neuropathy    •  Polyneuropathy    • Seasonal allergies    • Sinus headache        Past Surgical History:   Procedure Laterality Date   • CERVICAL LYMPH NODE BIOPSY/EXCISION Right 5/10/2021    Procedure: Excisional biopsy right level II cervical lymph node;  Surgeon: Christopher Hurd MD;  Location: [facility];  Service: ENT;  Laterality: Right;   • CHOLECYSTECTOMY     • TONSILLECTOMY         Outpatient Medications Marked as Taking for the 10/18/22 encounter (Office Visit) with Sanjana Nichols APRN   Medication Sig Dispense Refill   • aspirin 81 MG chewable tablet aspirin 81 mg chewable tablet   Chew 1 tablet every day by oral route at bedtime.     • atorvastatin (LIPITOR) 20 MG tablet Take 1 tablet by mouth every night at bedtime.     • COLLAGEN-BORON-HYALURONIC ACID PO Take 1 tablet by mouth Daily.     • famotidine (PEPCID) 20 MG tablet Take 20 mg by mouth 2 (Two) Times a Day.     • fexofenadine-pseudoephedrine (ALLEGRA-D 24) 180-240 MG per 24 hr tablet Take 1 tablet by mouth Daily.     • gabapentin (NEURONTIN) 300 MG capsule Take 300 mg by mouth Daily.     • gabapentin (NEURONTIN) 600 MG tablet 600 mg 2 (Two) Times a Day.     • hydrOXYzine (ATARAX) 50 MG tablet Take 50 mg by mouth Every Night.     • MULTIPLE VITAMIN PO Take 1 tablet by mouth Daily.     • mupirocin (BACTROBAN) 2 % ointment mupirocin 2 % topical ointment   APPLY 1 APPLICATION TOPICALLY TO THE APPROPRIATE AREA AS DIRECTED 2 (TWO) TIMES A DAY FOR 14 DAYS.     • selenium sulfide (SELSUN) 2.5 % lotion selenium sulfide 2.5 % lotion   APPLY TO SCALP 2-3 TIMES A WEEK AS A SHAMPOO.     • selenium sulfide (SELSUN) 2.5 % lotion APPLY TO SCALP 2-3 TIMES A WEEK AS A SHAMPOO.     • sucralfate (CARAFATE) 1 g tablet TAKE 1 TABLET BY MOUTH ON AN EMPTY STOMACH THREE TIMES A DAY BEFORE MEALS ORALLY 30 DAY(S)  5   • Triamcinolone Acetonide (NASACORT) 55 MCG/ACT nasal inhaler into the nostril(s) as directed by provider.     • vitamin B-12 (CYANOCOBALAMIN) 100 MCG tablet Take 0.5  tablets by mouth.     • vitamin B-12 (CYANOCOBALAMIN) 1000 MCG tablet Take  by mouth Daily.     • VITAMIN D, CHOLECALCIFEROL, PO Take 1 tablet by mouth Daily.         Amoxicillin-pot clavulanate, Clarithromycin, Doxepin, Sulfamethoxazole-trimethoprim, Gluten meal, Glutethimides, Gemifloxacin, Latex, and Proton pump inhibitors    Family History   Problem Relation Age of Onset   • Diabetes Maternal Grandmother    • Diabetes Maternal Grandfather        Social History     Socioeconomic History   • Marital status:    Tobacco Use   • Smoking status: Never   • Smokeless tobacco: Never   Vaping Use   • Vaping Use: Never used   Substance and Sexual Activity   • Alcohol use: No   • Drug use: No   • Sexual activity: Defer       Review of Systems   Constitutional: Negative.    HENT: Positive for congestion and postnasal drip.    Respiratory: Positive for cough.    Allergic/Immunologic: Positive for environmental allergies and food allergies.       Vitals:    10/18/22 1100   BP: 137/82   Pulse: 90   Temp: 98 °F (36.7 °C)   SpO2: 100%       Body mass index is 28.38 kg/m².    Objective     Physical Exam  Vitals reviewed.   Constitutional:       Appearance: Normal appearance. She is normal weight.   HENT:      Head: Normocephalic.      Right Ear: Hearing, tympanic membrane, ear canal and external ear normal.      Left Ear: Hearing, tympanic membrane, ear canal and external ear normal.      Nose:      Right Turbinates: Enlarged.      Left Turbinates: Enlarged.      Mouth/Throat:      Lips: Pink.      Mouth: Mucous membranes are moist.      Pharynx: Uvula midline.   Neurological:      Mental Status: She is alert.         Assessment & Plan   Diagnoses and all orders for this visit:    1. TMJ dysfunction (Primary)    2. Nasal congestion  -     Prick Testing (multi-Test)  -     Intradermal Allergy Testing    3. Chronic allergic rhinitis  -     Prick Testing (multi-Test)  -     Intradermal Allergy Testing    4. Nasal septal  deviation    5. Laryngopharyngeal reflux    6. Hx of parotitis  -     CT Soft Tissue Neck With & Without Contrast; Future    7. Parotid swelling  -     CT Soft Tissue Neck With & Without Contrast; Future      * Surgery not found *  Orders Placed This Encounter   Procedures   • Prick Testing (multi-Test)   • Intradermal Allergy Testing   • CT Soft Tissue Neck With & Without Contrast     Standing Status:   Future     Standing Expiration Date:   10/18/2023     Order Specific Question:   Release to patient     Answer:   Immediate     Return in about 6 months (around 4/18/2023).     Discuss ct scan to evaluate parotid enlargement or salivary stone vs monitoring for relief through tmj treatments, patient wishes to proceed with ct scan   Continue nasal sprays   discussed recommendation of using allegra and allegra d only as needed for allergy symptoms (itchy/watery eyes, sneezing, rhinitis, etc)   Offered prick test/multi test for allergy testing and discussed with patient she will have to stop ALL allergy medications for 7 days prior to testing - patient wishes to proceed      Patient Instructions   For the best response, use your nasal sprays every day without skipping doses. It may take several weeks before the full effect is acheived.  GENERAL ALLERGY AVOIDANCE: Regular vacuum cleaning is  recommended  to prevent accumulation of allergens. Use adequate filtration, including double thickness bags or HEPA filters on the  outlet. Use air-conditioning where possible. Change central air filters with an allergy rated filter on a regular basis. Install car pollen filters where possible.   DUST MITE AVOIDANCE: Use matress and pillow covers to prevent dust mite contamination. Wash bedsheets in hot water at least twice a week to prevent dust mites. Try a dehumidifier to discourage dust mite growth. Consider removing carpets and replaceing with hard wook miryam. Remove things like drapery and upholstery if possible to  prevent dust collection. Dust with a mask and a damp duster.   DOG AVOIDANCE: Consider removing the dog from home (make the dog an outdoor dog). Keep the dog out of areas of the home where you spend the most time: (i.e. Bedrooms, Family Rooms). Minimize dog contact with carpets, upholstered furniture and bedding. Consider bathing dog every 2 weeks.  MOLD AVOIDANCE: Avoiding Outdoor Molds: 1) Decrease decaying vegetationand mulch near house 2) Ventilate crawl space 3) Sump pump excess water 4) Drain low lying areas around house 5) Avoid lawnmowing or wear mask during and after mowing 6) Avoid storage areas of grains, callejas and decaying vegetation -Avoiding Indoor Molds: 1) Keep humidity below 50% with a dehumidifier 2) Repair leaks- use fungicide 3) Clean up damp areas 4) Use mold retardant in paint, wallpaper glue, shower curtains, grout, clean regularly with a chlorine bleach solution 5) Examine houseplants for molds, especially in the soil 6) Examine basements, crawl spaces for water and damp areas. 7) Avoid carpet in the basement areas.    How to use an EpiPen: 1) Hold the EpiPen in one hand, making a fist around it. Make sure the orange tip of the pen is pointing downward. 2) Remove the blue safety piece from the other end of the EpiPen, using your free hand. 3 Put the arm holding the EpiPen at your side, aiming the tip of the pen at your outer thigh area. 4) Swing your arm back, and bring the tip of the EpiPen into your outer thigh, firmly. Push the pen into your thigh until it makes a clicking noise. There is no need to remove your clothing, as the EpiPen is designed to go through fabric. 5) Hold the EpiPen in place for approximately 10 seconds. 6 Remove the pen and massage the area of the injection softly. Seek medical attention, promptly, and be sure to take the EpiPen with you. Side effects make driving while under the influence of EpiPen dangerous. Have someone drive you or call an ambulance when seeking  medical attention after the injection. 6) Go straight to the emergency room if any signs of anaphylaxis: mouth or throat swelling, wheezing, or hives.

## 2022-10-18 NOTE — PATIENT INSTRUCTIONS
For the best response, use your nasal sprays every day without skipping doses. It may take several weeks before the full effect is acheived.  GENERAL ALLERGY AVOIDANCE: Regular vacuum cleaning is  recommended  to prevent accumulation of allergens. Use adequate filtration, including double thickness bags or HEPA filters on the  outlet. Use air-conditioning where possible. Change central air filters with an allergy rated filter on a regular basis. Install car pollen filters where possible.   DUST MITE AVOIDANCE: Use matress and pillow covers to prevent dust mite contamination. Wash bedsheets in hot water at least twice a week to prevent dust mites. Try a dehumidifier to discourage dust mite growth. Consider removing carpets and replaceing with hard wook miryam. Remove things like drapery and upholstery if possible to prevent dust collection. Dust with a mask and a damp duster.   DOG AVOIDANCE: Consider removing the dog from home (make the dog an outdoor dog). Keep the dog out of areas of the home where you spend the most time: (i.e. Bedrooms, Family Rooms). Minimize dog contact with carpets, upholstered furniture and bedding. Consider bathing dog every 2 weeks.  MOLD AVOIDANCE: Avoiding Outdoor Molds: 1) Decrease decaying vegetationand mulch near house 2) Ventilate crawl space 3) Sump pump excess water 4) Drain low lying areas around house 5) Avoid lawnmowing or wear mask during and after mowing 6) Avoid storage areas of grains, callejas and decaying vegetation -Avoiding Indoor Molds: 1) Keep humidity below 50% with a dehumidifier 2) Repair leaks- use fungicide 3) Clean up damp areas 4) Use mold retardant in paint, wallpaper glue, shower curtains, grout, clean regularly with a chlorine bleach solution 5) Examine houseplants for molds, especially in the soil 6) Examine basements, crawl spaces for water and damp areas. 7) Avoid carpet in the basement areas.    How to use an EpiPen: 1) Hold the EpiPen in one hand,  making a fist around it. Make sure the orange tip of the pen is pointing downward. 2) Remove the blue safety piece from the other end of the EpiPen, using your free hand. 3 Put the arm holding the EpiPen at your side, aiming the tip of the pen at your outer thigh area. 4) Swing your arm back, and bring the tip of the EpiPen into your outer thigh, firmly. Push the pen into your thigh until it makes a clicking noise. There is no need to remove your clothing, as the EpiPen is designed to go through fabric. 5) Hold the EpiPen in place for approximately 10 seconds. 6 Remove the pen and massage the area of the injection softly. Seek medical attention, promptly, and be sure to take the EpiPen with you. Side effects make driving while under the influence of EpiPen dangerous. Have someone drive you or call an ambulance when seeking medical attention after the injection. 6) Go straight to the emergency room if any signs of anaphylaxis: mouth or throat swelling, wheezing, or hives.

## 2022-10-27 NOTE — PROGRESS NOTES
Subjective:     Encounter Date: 10/28/2022      Patient ID: Lizbeth Rivera is a 56 y.o. female with neuropathy, and palpitations    Chief Complaint: holter monitor follow up  Palpitations   This is a chronic problem. The current episode started more than 1 year ago. The problem occurs intermittently. The problem has been rapidly worsening. Associated symptoms include diaphoresis, shortness of breath, syncope and weakness (right arm). Pertinent negatives include no chest fullness, chest pain, irregular heartbeat or near-syncope.     Patient presents today for management of heart racing. Patient was previously seen by Dr Greer in 6/2020 for evaluation of chest pain and shortness of breath. BNP was ordered and was normal. Stress echo was done and was low risk..   Patient reports that about 3-4 months ago she started having episodes of sweating and dizziness. She reports that it was getting worse and she started to feel like she was going to pass out. She states that they were happening up to three times a day. She states that she had a severe episode on 9/7/2022 that was different and concerning. She reported that she was sitting at the time with her family. She states that they had just eaten. When she suddenly developed a sharp pain in her neck/chin area, and in her diaphragm. She became nauseated and went to the restroom. Then she had the urge to have a BM so she sat on the toliet. She reports it was like she lost control of her bowels but it was not loose like diarrhea. She states that she went to reach for the toliet paper and noted she had right arm weakness and couldn't grab any. She yelled and slammed to door to get the attention of her . He reportedly helped her out of the bathroom and she collapsed. He had described it to her as she was in and out of consciousness and was limp. She went to [facility]. She had workup that revealed normal EKGs and normal scans. She reported that she had  improvement in her right arm weakness after about 3 hours but it didn't completely resolve. She called her PCP the following day. They recommended that due to lingering right arm weakness she should return to the ER for evaluation; she called her neurologist who recommended the same. She came to [facility] ER. Stroke imaging was negative, MRI brain was done and was negative for an acute process although did show chronic microvascular disease, albs were unremarkable and CXR showed no acute findings. EKG showed no acute changes. She followed with Dr Lew, her neurologist in [place] 5 days later and was diagnosed with a suspected stroke. She was started on asprin and atorvastatin. She reports that Dr Lew ordered an echo and holter monitor. She had followed up with her PCP and monitor had not been placed yet so she ordered one instead. She had an echo and holter monitor and neck xray done. She had her echo on 10/12/2022. She was called yesterday and recommended to start a beta blocker but she wanted to have this appointment first. Patients holter monitor showed; heart rates during monitoring period were noted to be  bpm with average heart rate of 82. 5 runs of SVT were noted. Longest was 16.8 seconds and fastest with 171 bpm. Patient reports symptoms to an episode of SVT and SR when heart rates were 135-156 on 5 or the 7 triggers. Remaining 2 triggers were noted during NSR with controlled HR. Mother has recently been hospitalized with syncope/dizziness as well. Today patient reports that she is doing ok. She states that her right arm weakness has continued to resolve. She denies any dizziness or near syncope in the last couple of weeks. She reports intermittent heart racing at times. She denies any chest pain. She denies any dyspnea on exertion, leg swelling, orthopnea or PND. She follows with Dr Sanchez as PCP.     Previous Cardiac Testing and Procedures:  -Holter monitor (3/30/2015) rare atrial ectopic beats with  a 6 beat run of nonsustained SVT, no significant ventricular arrhythmias, palpitations correlate with SVT  -Echo (4/1/2015) EF 50-55%, normal diastolic function, normal LA and RA size, normal RV size and function, mild TR  -EKG treadmill stress (5/11/2015) average functional capacity, negative for ischemia  -Stress echo (6/16/2022): low risk  -Holter monitor (10/2022): Heart rates during monitoring period were noted to be  bpm with average heart rate of 82. 5 runs of SVT were noted. Longest was 16.8 seconds and fastest with 171 bpm. Patient reports symptoms to an episode of SVT and SR when heart rates were 135-156 on 5 or the 7 triggers. Remaining 2 triggers were noted during NSR with controlled HR.     The following portions of the patient's history were reviewed and updated as appropriate: allergies, current medications, past family history, past medical history, past social history, past surgical history and problem list.    Allergies   Allergen Reactions   • Amoxicillin-Pot Clavulanate Shortness Of Breath   • Clarithromycin Swelling     Facial swelling with rash   • Doxepin Itching   • Sulfamethoxazole-Trimethoprim Shortness Of Breath   • Gluten Meal GI Intolerance   • Glutethimides GI Intolerance   • Gemifloxacin Palpitations   • Latex Rash   • Proton Pump Inhibitors Palpitations       Current Outpatient Medications:   •  aspirin 81 MG chewable tablet, aspirin 81 mg chewable tablet  Chew 1 tablet every day by oral route at bedtime., Disp: , Rfl:   •  atorvastatin (LIPITOR) 20 MG tablet, Take 1 tablet by mouth every night at bedtime., Disp: , Rfl:   •  COLLAGEN-BORON-HYALURONIC ACID PO, Take 1 tablet by mouth Daily., Disp: , Rfl:   •  famotidine (PEPCID) 20 MG tablet, Take 20 mg by mouth 2 (Two) Times a Day., Disp: , Rfl:   •  fexofenadine-pseudoephedrine (ALLEGRA-D 24) 180-240 MG per 24 hr tablet, Take 1 tablet by mouth Daily., Disp: , Rfl:   •  gabapentin (NEURONTIN) 300 MG capsule, Take 300 mg by mouth  Daily., Disp: , Rfl:   •  gabapentin (NEURONTIN) 600 MG tablet, 600 mg 2 (Two) Times a Day., Disp: , Rfl:   •  hydrOXYzine (ATARAX) 50 MG tablet, Take 50 mg by mouth Every Night., Disp: , Rfl:   •  MULTIPLE VITAMIN PO, Take 1 tablet by mouth Daily., Disp: , Rfl:   •  mupirocin (BACTROBAN) 2 % ointment, mupirocin 2 % topical ointment  APPLY 1 APPLICATION TOPICALLY TO THE APPROPRIATE AREA AS DIRECTED 2 (TWO) TIMES A DAY FOR 14 DAYS., Disp: , Rfl:   •  selenium sulfide (SELSUN) 2.5 % lotion, selenium sulfide 2.5 % lotion  APPLY TO SCALP 2-3 TIMES A WEEK AS A SHAMPOO., Disp: , Rfl:   •  selenium sulfide (SELSUN) 2.5 % lotion, APPLY TO SCALP 2-3 TIMES A WEEK AS A SHAMPOO., Disp: , Rfl:   •  sucralfate (CARAFATE) 1 g tablet, TAKE 1 TABLET BY MOUTH ON AN EMPTY STOMACH THREE TIMES A DAY BEFORE MEALS ORALLY 30 DAY(S), Disp: , Rfl: 5  •  Triamcinolone Acetonide (NASACORT) 55 MCG/ACT nasal inhaler, into the nostril(s) as directed by provider., Disp: , Rfl:   •  vitamin B-12 (CYANOCOBALAMIN) 100 MCG tablet, Take 0.5 tablets by mouth., Disp: , Rfl:   •  VITAMIN D, CHOLECALCIFEROL, PO, Take 1 tablet by mouth Daily., Disp: , Rfl:   •  metoprolol succinate XL (TOPROL-XL) 25 MG 24 hr tablet, Take 1 tablet by mouth Daily., Disp: 30 tablet, Rfl: 11  Past Medical History:   Diagnosis Date   • Abnormal ECG    • Asthma     some form of    • Colin's esophagus    • Celiac disease    • Cerebrovascular accident (CVA) ([facility]) 10/28/2022   • Joint pain    • Neuropathy    • Polyneuropathy    • Seasonal allergies    • Sinus headache      Social History     Socioeconomic History   • Marital status:    Tobacco Use   • Smoking status: Never   • Smokeless tobacco: Never   Vaping Use   • Vaping Use: Never used   Substance and Sexual Activity   • Alcohol use: No   • Drug use: No   • Sexual activity: Defer       Review of Systems   Constitutional: Positive for diaphoresis.   HENT: Negative for nosebleeds.    Cardiovascular: Positive for  "palpitations and syncope. Negative for chest pain, dyspnea on exertion, irregular heartbeat, leg swelling, near-syncope, orthopnea and paroxysmal nocturnal dyspnea.   Respiratory: Positive for shortness of breath.    Hematologic/Lymphatic: Does not bruise/bleed easily.   Genitourinary: Negative for hematuria.   Neurological: Positive for weakness (right arm).   All other systems reviewed and are negative.         Objective:     Vitals reviewed.   Constitutional:       General: Not in acute distress.     Appearance: Normal appearance. Well-developed.   Eyes:      Pupils: Pupils are equal, round, and reactive to light.   HENT:      Head: Normocephalic and atraumatic.      Nose: Nose normal.   Neck:      Vascular: No carotid bruit.   Pulmonary:      Effort: Pulmonary effort is normal. No respiratory distress.      Breath sounds: Normal breath sounds. No wheezing. No rales.   Cardiovascular:      Normal rate. Regular rhythm.      Murmurs: There is no murmur.   Edema:     Peripheral edema absent.   Abdominal:      General: There is no distension.      Palpations: Abdomen is soft.   Musculoskeletal: Normal range of motion.      Cervical back: Normal range of motion and neck supple. Skin:     General: Skin is warm.      Findings: No erythema or rash.   Neurological:      General: No focal deficit present.      Mental Status: Alert and oriented to person, place, and time.   Psychiatric:         Attention and Perception: Attention normal.         Mood and Affect: Mood normal.         Speech: Speech normal.         Behavior: Behavior normal.         Thought Content: Thought content normal.         Judgment: Judgment normal.         /64   Pulse (!) 124   Ht 170.2 cm (67\")   Wt 81.2 kg (179 lb)   SpO2 99%   BMI 28.04 kg/m²     Procedures    Lab Review:           Results for orders placed in visit on 06/05/20    Adult Stress Echo W/ Cont or Stress Agent if Necessary Per Protocol    Interpretation Summary  · Normal " baseline ECG noted at rest.  · The patient reported chest discomfort and shortness of breath during the stress test.  · Despite patient reported symptoms, there was no clinically significant ST segment deviation noted during stress.  · Post stress wall motion appears normal.  · Low risk stress echo with no significant ECG or echocardiographic evidence for myocardial ischemia.    I have personally reviewed stress echo, holter monitor and past office notes prior to patients visit  Assessment:          Diagnosis Plan   1. Paroxysmal SVT (supraventricular tachycardia) (HCC)        2. Palpitations        3. Idiopathic peripheral neuropathy        4. Overweight               Plan:       1. Paroxysmal SVT: Holter monitor worn for PCP recently showed Heart rates during monitoring period were noted to be  bpm with average heart rate of 82. 5 runs of SVT were noted. Longest was 16.8 seconds and fastest with 171 bpm. Patient reports symptoms to an episode of SVT and SR when heart rates were 135-156 on 5 or the 7 triggers. Remaining 2 triggers were noted during NSR with controlled HR. Recommend to start metoprolol. Instructed patient is she starts with side effects to call the office.  Will obtain echo from 10/12/2022 done at [facility] for review.     2. Palpitations: patient reports these have been chronic. Heart rates during monitoring period were noted to be  bpm with average heart rate of 82. 5 runs of SVT were noted. Longest was 16.8 seconds and fastest with 171 bpm. Patient reports symptoms to an episode of SVT and SR when heart rates were 135-156 on 5 or the 7 triggers. Remaining 2 triggers were noted during NSR with controlled HR. Will start metoprolol and continue to monitor.     3. Neuropathy/suspected stroke: [place] Continue aspirin and atorvastatin.     4. Overweight: BMI is >= 25 and <30. (Overweight) The following options were offered after discussion;: exercise  counseling/recommendations and nutrition counseling/recommendations    Patient is to follow up in 6 months or sooner if needed.

## 2022-10-28 ENCOUNTER — OFFICE VISIT (OUTPATIENT)
Dept: CARDIOLOGY | Facility: CLINIC | Age: 57
End: 2022-10-28

## 2022-10-28 VITALS
WEIGHT: 179 LBS | HEIGHT: 67 IN | HEART RATE: 124 BPM | SYSTOLIC BLOOD PRESSURE: 112 MMHG | DIASTOLIC BLOOD PRESSURE: 64 MMHG | OXYGEN SATURATION: 99 % | BODY MASS INDEX: 28.09 KG/M2

## 2022-10-28 DIAGNOSIS — R00.2 PALPITATIONS: ICD-10-CM

## 2022-10-28 DIAGNOSIS — I47.1 PAROXYSMAL SVT (SUPRAVENTRICULAR TACHYCARDIA): Primary | ICD-10-CM

## 2022-10-28 DIAGNOSIS — G60.9 IDIOPATHIC PERIPHERAL NEUROPATHY: ICD-10-CM

## 2022-10-28 DIAGNOSIS — E66.3 OVERWEIGHT: ICD-10-CM

## 2022-10-28 PROBLEM — I63.9 CEREBROVASCULAR ACCIDENT (CVA) (HCC): Status: ACTIVE | Noted: 2022-10-28

## 2022-10-28 PROCEDURE — 99214 OFFICE O/P EST MOD 30 MIN: CPT | Performed by: NURSE PRACTITIONER

## 2022-10-28 RX ORDER — METOPROLOL SUCCINATE 25 MG/1
25 TABLET, EXTENDED RELEASE ORAL DAILY
Qty: 30 TABLET | Refills: 11 | Status: SHIPPED | OUTPATIENT
Start: 2022-10-28 | End: 2022-11-30 | Stop reason: SINTOL

## 2022-11-04 ENCOUNTER — TELEPHONE (OUTPATIENT)
Dept: CARDIOLOGY | Facility: CLINIC | Age: 57
End: 2022-11-04

## 2022-11-04 NOTE — TELEPHONE ENCOUNTER
Patient has been notified with results    ----- Message from DION Otto sent at 11/4/2022  8:40 AM CDT -----  Please notify patient that I have reviewed her echo from [facility]. It showed LVEF 50-55% which is at the normal level. She also had mild mitral regurgitation which we will monitor routinely (3-5 years) for change. Thanks    ----- Message -----  From: Denise Duarte MA  Sent: 11/3/2022   9:38 AM CDT  To: DION Otto    Scanned under media-[facility]  ----- Message -----  From: Rolly Mcnulty APRN  Sent: 10/28/2022   1:27 PM CDT  To: Denise Duarte MA    Please obtain echo from [facility] done on 10/12/2022. This was ordered by her Neurologist and has a bubble study on it. Thanks

## 2022-11-10 ENCOUNTER — TELEPHONE (OUTPATIENT)
Dept: CARDIOLOGY | Facility: CLINIC | Age: 57
End: 2022-11-10

## 2022-11-10 NOTE — TELEPHONE ENCOUNTER
Caller: DION NAVAS OFFICE    Relationship: Other    Best call back number: [phone]    What form or medical record are you requesting: LAST OFFICE NOTE    Who is requesting this form or medical record from you: DION NAVAS OFFICE    How would you like to receive the form or medical records (pick-up, mail, fax): FAX  If fax, what is the fax number: [phone]    Timeframe paperwork needed: ASAP

## 2022-11-14 DIAGNOSIS — R09.81 NASAL CONGESTION: Primary | ICD-10-CM

## 2022-11-14 DIAGNOSIS — J30.9 CHRONIC ALLERGIC RHINITIS: ICD-10-CM

## 2022-11-30 ENCOUNTER — TELEPHONE (OUTPATIENT)
Dept: CARDIOLOGY | Facility: CLINIC | Age: 57
End: 2022-11-30

## 2022-11-30 RX ORDER — DILTIAZEM HYDROCHLORIDE 120 MG/1
120 CAPSULE, COATED, EXTENDED RELEASE ORAL DAILY
Qty: 30 CAPSULE | Refills: 11 | Status: SHIPPED | OUTPATIENT
Start: 2022-11-30

## 2022-11-30 NOTE — TELEPHONE ENCOUNTER
Patient has been notified and verbalized understanding.      ----- Message from DION Otto sent at 11/30/2022  4:06 PM CST -----  Yes. I will send cardizem 120 mg for her to try. Thanks  ----- Message -----  From: Denise Duarte MA  Sent: 11/30/2022   2:51 PM CST  To: DION Otto    Mrs Rivera called today because she has stopped her Metoprolol due to it causing her insomnia.  She stopped the medicine on Friday and feels the palpitations are the same as when she was on it. Do you recommend her to try a different medication?  She is able to sleep now without the Metoprolol.

## 2022-12-12 ENCOUNTER — TELEPHONE (OUTPATIENT)
Dept: OTOLARYNGOLOGY | Facility: CLINIC | Age: 57
End: 2022-12-12

## 2022-12-12 NOTE — TELEPHONE ENCOUNTER
Caller: Lizbeth Rivera    Relationship: Self    Best call back number: [phone]    Who are you requesting to speak with (clinical staff, provider,  specific staff member):   CLINICAL    Do you know the name of the person who called:     What was the call regarding:     RECEIVED A CALL FROM PT. SHE CALLED TO VERIFY CT SCAN AND AS OF RIGHT NOW DOES NOT WANT TO FOLLOW THROUGH WITH CT SCAN. SHE MAY CALL BACK IF SHE CHANGES HER MIND. PROVIDED PT WITH CENTRAL SCHEDULING PHONE NUMBER IF SHE DECIDES TO FOLLOW THROUGH.

## 2023-01-18 ENCOUNTER — APPOINTMENT (OUTPATIENT)
Dept: CT IMAGING | Facility: HOSPITAL | Age: 58
End: 2023-01-18
Payer: COMMERCIAL

## 2023-01-18 ENCOUNTER — HOSPITAL ENCOUNTER (EMERGENCY)
Facility: HOSPITAL | Age: 58
Discharge: HOME | End: 2023-01-19
Attending: FAMILY MEDICINE | Admitting: FAMILY MEDICINE
Payer: COMMERCIAL

## 2023-01-18 ENCOUNTER — APPOINTMENT (OUTPATIENT)
Dept: GENERAL RADIOLOGY | Facility: HOSPITAL | Age: 58
End: 2023-01-18
Payer: COMMERCIAL

## 2023-01-18 ENCOUNTER — HOSPITAL ENCOUNTER (OUTPATIENT)
Dept: GENERAL RADIOLOGY | Facility: HOSPITAL | Age: 58
Discharge: HOME | End: 2023-01-18
Payer: COMMERCIAL

## 2023-01-18 DIAGNOSIS — R07.81 PLEURITIC CHEST PAIN: ICD-10-CM

## 2023-01-18 DIAGNOSIS — J90 BILATERAL PLEURAL EFFUSION: Primary | ICD-10-CM

## 2023-01-18 DIAGNOSIS — R05.1 ACUTE COUGH: ICD-10-CM

## 2023-01-18 DIAGNOSIS — J40 BRONCHITIS: ICD-10-CM

## 2023-01-18 LAB
ALBUMIN SERPL-MCNC: 3.8 G/DL (ref 3.5–5.2)
ALBUMIN/GLOB SERPL: 1.2 G/DL
ALP SERPL-CCNC: 103 U/L (ref 39–117)
ALT SERPL W P-5'-P-CCNC: 19 U/L (ref 1–33)
ANION GAP SERPL CALCULATED.3IONS-SCNC: 10 MMOL/L (ref 5–15)
AST SERPL-CCNC: 18 U/L (ref 1–32)
BASOPHILS # BLD AUTO: 0.02 10*3/MM3 (ref 0–0.2)
BASOPHILS NFR BLD AUTO: 0.2 % (ref 0–1.5)
BILIRUB SERPL-MCNC: 0.6 MG/DL (ref 0–1.2)
BUN SERPL-MCNC: 10 MG/DL (ref 6–20)
BUN/CREAT SERPL: 11.8 (ref 7–25)
CALCIUM SPEC-SCNC: 9.2 MG/DL (ref 8.6–10.5)
CHLORIDE SERPL-SCNC: 102 MMOL/L (ref 98–107)
CO2 SERPL-SCNC: 31 MMOL/L (ref 22–29)
CREAT SERPL-MCNC: 0.85 MG/DL (ref 0.57–1)
D DIMER PPP FEU-MCNC: 0.72 MCGFEU/ML (ref 0–0.55)
D-LACTATE SERPL-SCNC: 1.5 MMOL/L (ref 0.5–2)
DEPRECATED RDW RBC AUTO: 47 FL (ref 37–54)
EGFRCR SERPLBLD CKD-EPI 2021: 81 ML/MIN/1.73
EOSINOPHIL # BLD AUTO: 0.25 10*3/MM3 (ref 0–0.4)
EOSINOPHIL NFR BLD AUTO: 2.3 % (ref 0.3–6.2)
ERYTHROCYTE [DISTWIDTH] IN BLOOD BY AUTOMATED COUNT: 14.2 % (ref 12.3–15.4)
GLOBULIN UR ELPH-MCNC: 3.2 GM/DL
GLUCOSE SERPL-MCNC: 96 MG/DL (ref 65–99)
HCT VFR BLD AUTO: 46.1 % (ref 34–46.6)
HGB BLD-MCNC: 14.2 G/DL (ref 12–15.9)
IMM GRANULOCYTES # BLD AUTO: 0.08 10*3/MM3 (ref 0–0.05)
IMM GRANULOCYTES NFR BLD AUTO: 0.7 % (ref 0–0.5)
LYMPHOCYTES # BLD AUTO: 3.01 10*3/MM3 (ref 0.7–3.1)
LYMPHOCYTES NFR BLD AUTO: 27.6 % (ref 19.6–45.3)
MAGNESIUM SERPL-MCNC: 2.3 MG/DL (ref 1.6–2.6)
MCH RBC QN AUTO: 27.8 PG (ref 26.6–33)
MCHC RBC AUTO-ENTMCNC: 30.8 G/DL (ref 31.5–35.7)
MCV RBC AUTO: 90.4 FL (ref 79–97)
MONOCYTES # BLD AUTO: 0.59 10*3/MM3 (ref 0.1–0.9)
MONOCYTES NFR BLD AUTO: 5.4 % (ref 5–12)
NEUTROPHILS NFR BLD AUTO: 6.94 10*3/MM3 (ref 1.7–7)
NEUTROPHILS NFR BLD AUTO: 63.8 % (ref 42.7–76)
NRBC BLD AUTO-RTO: 0 /100 WBC (ref 0–0.2)
PLATELET # BLD AUTO: 313 10*3/MM3 (ref 140–450)
PMV BLD AUTO: 9.1 FL (ref 6–12)
POTASSIUM SERPL-SCNC: 3.6 MMOL/L (ref 3.5–5.2)
PROCALCITONIN SERPL-MCNC: 0.06 NG/ML (ref 0–0.25)
PROT SERPL-MCNC: 7 G/DL (ref 6–8.5)
RBC # BLD AUTO: 5.1 10*6/MM3 (ref 3.77–5.28)
SODIUM SERPL-SCNC: 143 MMOL/L (ref 136–145)
TROPONIN T SERPL-MCNC: <0.01 NG/ML (ref 0–0.03)
WBC NRBC COR # BLD: 10.89 10*3/MM3 (ref 3.4–10.8)

## 2023-01-18 PROCEDURE — 83735 ASSAY OF MAGNESIUM: CPT | Performed by: FAMILY MEDICINE

## 2023-01-18 PROCEDURE — 93005 ELECTROCARDIOGRAM TRACING: CPT | Performed by: EMERGENCY MEDICINE

## 2023-01-18 PROCEDURE — 72070 X-RAY EXAM THORAC SPINE 2VWS: CPT

## 2023-01-18 PROCEDURE — 71046 X-RAY EXAM CHEST 2 VIEWS: CPT

## 2023-01-18 PROCEDURE — 84484 ASSAY OF TROPONIN QUANT: CPT | Performed by: FAMILY MEDICINE

## 2023-01-18 PROCEDURE — 96375 TX/PRO/DX INJ NEW DRUG ADDON: CPT

## 2023-01-18 PROCEDURE — 85025 COMPLETE CBC W/AUTO DIFF WBC: CPT | Performed by: FAMILY MEDICINE

## 2023-01-18 PROCEDURE — 80053 COMPREHEN METABOLIC PANEL: CPT | Performed by: FAMILY MEDICINE

## 2023-01-18 PROCEDURE — 25010000002 HYDROMORPHONE PER 4 MG: Performed by: FAMILY MEDICINE

## 2023-01-18 PROCEDURE — 96374 THER/PROPH/DIAG INJ IV PUSH: CPT

## 2023-01-18 PROCEDURE — 85379 FIBRIN DEGRADATION QUANT: CPT | Performed by: FAMILY MEDICINE

## 2023-01-18 PROCEDURE — 84145 PROCALCITONIN (PCT): CPT | Performed by: FAMILY MEDICINE

## 2023-01-18 PROCEDURE — 83605 ASSAY OF LACTIC ACID: CPT | Performed by: FAMILY MEDICINE

## 2023-01-18 PROCEDURE — 71275 CT ANGIOGRAPHY CHEST: CPT

## 2023-01-18 PROCEDURE — 94799 UNLISTED PULMONARY SVC/PX: CPT

## 2023-01-18 PROCEDURE — 94640 AIRWAY INHALATION TREATMENT: CPT

## 2023-01-18 PROCEDURE — 25010000002 METHYLPREDNISOLONE PER 125 MG: Performed by: FAMILY MEDICINE

## 2023-01-18 PROCEDURE — 99283 EMERGENCY DEPT VISIT LOW MDM: CPT

## 2023-01-18 PROCEDURE — 0 IOPAMIDOL PER 1 ML: Performed by: FAMILY MEDICINE

## 2023-01-18 RX ORDER — SODIUM CHLORIDE 0.9 % (FLUSH) 0.9 %
10 SYRINGE (ML) INJECTION AS NEEDED
Status: DISCONTINUED | OUTPATIENT
Start: 2023-01-18 | End: 2023-01-19 | Stop reason: HOSPADM

## 2023-01-18 RX ORDER — ALBUTEROL SULFATE 90 UG/1
2 AEROSOL, METERED RESPIRATORY (INHALATION) EVERY 4 HOURS PRN
Qty: 18 G | Refills: 0 | Status: SHIPPED | OUTPATIENT
Start: 2023-01-18 | End: 2023-02-17 | Stop reason: SINTOL

## 2023-01-18 RX ORDER — ALBUTEROL SULFATE 1.25 MG/3ML
1 SOLUTION RESPIRATORY (INHALATION) EVERY 6 HOURS PRN
Qty: 60 ML | Refills: 0 | Status: SHIPPED | OUTPATIENT
Start: 2023-01-18 | End: 2023-02-17 | Stop reason: SINTOL

## 2023-01-18 RX ORDER — METHYLPREDNISOLONE SODIUM SUCCINATE 125 MG/2ML
125 INJECTION, POWDER, LYOPHILIZED, FOR SOLUTION INTRAMUSCULAR; INTRAVENOUS ONCE
Status: COMPLETED | OUTPATIENT
Start: 2023-01-18 | End: 2023-01-18

## 2023-01-18 RX ORDER — IPRATROPIUM BROMIDE AND ALBUTEROL SULFATE 2.5; .5 MG/3ML; MG/3ML
3 SOLUTION RESPIRATORY (INHALATION) ONCE
Status: COMPLETED | OUTPATIENT
Start: 2023-01-18 | End: 2023-01-18

## 2023-01-18 RX ORDER — HYDROMORPHONE HYDROCHLORIDE 1 MG/ML
0.5 INJECTION, SOLUTION INTRAMUSCULAR; INTRAVENOUS; SUBCUTANEOUS ONCE
Status: COMPLETED | OUTPATIENT
Start: 2023-01-18 | End: 2023-01-18

## 2023-01-18 RX ADMIN — IOPAMIDOL 77 ML: 755 INJECTION, SOLUTION INTRAVENOUS at 22:22

## 2023-01-18 RX ADMIN — METHYLPREDNISOLONE SODIUM SUCCINATE 125 MG: 125 INJECTION, POWDER, FOR SOLUTION INTRAMUSCULAR; INTRAVENOUS at 20:25

## 2023-01-18 RX ADMIN — HYDROMORPHONE HYDROCHLORIDE 0.5 MG: 1 INJECTION, SOLUTION INTRAMUSCULAR; INTRAVENOUS; SUBCUTANEOUS at 21:33

## 2023-01-18 RX ADMIN — IPRATROPIUM BROMIDE AND ALBUTEROL SULFATE 3 ML: 2.5; .5 SOLUTION RESPIRATORY (INHALATION) at 19:59

## 2023-01-19 VITALS
WEIGHT: 187 LBS | BODY MASS INDEX: 29.35 KG/M2 | TEMPERATURE: 98.7 F | RESPIRATION RATE: 16 BRPM | HEART RATE: 88 BPM | SYSTOLIC BLOOD PRESSURE: 138 MMHG | OXYGEN SATURATION: 100 % | DIASTOLIC BLOOD PRESSURE: 66 MMHG | HEIGHT: 67 IN

## 2023-01-19 NOTE — DISCHARGE INSTRUCTIONS
Your lab work-up is normal, your CT scan showed no blood clot in your lungs, you do have a small amount of fluid noted on both of your lungs, you should follow-up with your PCP regarding this.  Please make an appointment with him in the next 2 days or so.  Please return the ED with any new, worsening, or persistent symptoms.  I am sending home with a prescription for a home nebulizer so that he can do breathing treatments at home as needed, I am also sending you home with a prescription for an albuterol inhaler to use for wheezing as needed.

## 2023-01-19 NOTE — ED PROVIDER NOTES
Subjective   History of Present Illness  Patient presents emergency room today with complaints of shortness of breath.  Chest pain.  Patient states that her chest pain has been worsening for the last 3 days.  Patient states that it is around her entire chest into her back.  Patient states that it is a sharp pain.  Patient states that is worse with cough.  Patient has clear white sputum.  Patient was recently on antibiotics and steroids for bronchitis.  Patient just finished those medications yesterday.  Patient denies any fevers or chills.  Patient states that it hurts to take a deep breath.        Review of Systems   Respiratory: Positive for cough, chest tightness and shortness of breath.    Cardiovascular: Positive for chest pain.   All other systems reviewed and are negative.      Past Medical History:   Diagnosis Date   • Abnormal ECG    • Asthma     some form of    • Colin's esophagus    • Celiac disease    • Cerebrovascular accident (CVA) ([facility]) 10/28/2022   • Joint pain    • Neuropathy    • Polyneuropathy    • Seasonal allergies    • Sinus headache        Allergies   Allergen Reactions   • Amoxicillin-Pot Clavulanate Shortness Of Breath   • Clarithromycin Swelling     Facial swelling with rash   • Doxepin Itching   • Sulfamethoxazole-Trimethoprim Shortness Of Breath   • Gluten Meal GI Intolerance   • Glutethimides GI Intolerance   • Gemifloxacin Palpitations   • Latex Rash   • Proton Pump Inhibitors Palpitations       Past Surgical History:   Procedure Laterality Date   • CERVICAL LYMPH NODE BIOPSY/EXCISION Right 5/10/2021    Procedure: Excisional biopsy right level II cervical lymph node;  Surgeon: Christopher Hurd MD;  Location: [facility];  Service: ENT;  Laterality: Right;   • CHOLECYSTECTOMY     • TONSILLECTOMY         Family History   Problem Relation Age of Onset   • Diabetes Maternal Grandmother    • Diabetes Maternal Grandfather        Social History     Socioeconomic History   • Marital status:     Tobacco Use   • Smoking status: Never   • Smokeless tobacco: Never   Vaping Use   • Vaping Use: Never used   Substance and Sexual Activity   • Alcohol use: No   • Drug use: No   • Sexual activity: Defer           Objective   Physical Exam  Vitals and nursing note reviewed.   Constitutional:       Appearance: She is well-developed.   HENT:      Head: Normocephalic and atraumatic.      Mouth/Throat:      Mouth: Mucous membranes are moist.   Eyes:      Extraocular Movements: Extraocular movements intact.      Pupils: Pupils are equal, round, and reactive to light.   Cardiovascular:      Rate and Rhythm: Normal rate and regular rhythm.   Pulmonary:      Breath sounds: Normal breath sounds.   Chest:      Chest wall: Tenderness present.   Abdominal:      General: Bowel sounds are normal.      Palpations: Abdomen is soft.   Skin:     General: Skin is warm and dry.   Neurological:      General: No focal deficit present.      Mental Status: She is alert and oriented to person, place, and time.   Psychiatric:         Mood and Affect: Mood normal.         Behavior: Behavior normal.         Procedures           ED Course  ED Course as of 01/21/23 1613   Wed Jan 18, 2023   1944 EKG rate 96  Normal sinus rhythm  No ST changes [RP]   1952 EXAMINATION:  XR CHEST 2 VW-  1/18/2023 4:47 PM CST     HISTORY: R07.1-Chest pain on breathing.     COMPARISON: 09/08/2022.     TECHNIQUE: 2 views were obtained.     FINDINGS:  There is blunting of the costophrenic angles bilaterally.  There is mild linear infiltrate in both lung bases. The heart is normal  in size. There are degenerative changes of the spine.     IMPRESSION:  1. Small pleural effusions bilaterally.  2. Linear infiltrate in both lung bases, likely atelectasis. Pneumonia  less likely.         This report was finalized on 01/18/2023 17:08 by Dr. Zain Schuler MD [RP]   3264 I have taken over care of the pt at this time, I received report from Dr. Louise.  [HE]   9169 CT  angiogram chest is unremarkable; there is no acute pulmonary embolism, no focal infiltrate or pneumothorax, there is small bilateral pleural effusions with mild dependent atelectasis, there is no cardiomegaly, no pericardial effusion, nonaneurysmal aorta, cholecystectomy, degenerative changes of the spine. [HE]   2357 Have discussed the patient's lab and imaging results with her at this time which includes a slightly elevated WBC as well as an elevated D-dimer, subsequently a CTA of the chest was performed and revealed no pulmonary embolism, however she was noted to have small bilateral pleural effusions with atelectasis.  Vital signs remain reassuring, O2 sat on room air is 100%.  I have encouraged her to follow-up with her PCP within the next 2 to 3 days for further eval and management as needed.  I discussed this patient's case with Dr. Grover who agrees patient safe for discharge.  She is requesting be sent home with an albuterol inhaler for wheezing as needed as well as a neb machine with treatments as she reports she received a lot of relief with her neb treatment in the ED today.  She continues to complain of some the left chest wall pain in the mid axillary area.  I believe this is likely pleuritic chest pain.  She may take Tylenol and ibuprofen over-the-counter as needed for this. [HE]      ED Course User Index  [HE] Rosanna Vásquez APRN  [RP] Reinier Louise MD                                           Medical Decision Making  Acute cough: acute illness or injury  Bilateral pleural effusion: acute illness or injury  Bronchitis: acute illness or injury  Pleuritic chest pain: acute illness or injury  Amount and/or Complexity of Data Reviewed  Labs: ordered. Decision-making details documented in ED Course.  Radiology: ordered. Decision-making details documented in ED Course.  ECG/medicine tests:  Decision-making details documented in ED Course.      Risk  Prescription drug management.          Final  diagnoses:   Bilateral pleural effusion   Acute cough   Pleuritic chest pain   Bronchitis       ED Disposition  ED Disposition     ED Disposition   Discharge    Condition   Stable    Comment   --             Melvi Sanchez, DO  [address]  [place] [address] [phone]    In 2 days           Medication List      Changed    * albuterol sulfate  (90 Base) MCG/ACT inhaler  Commonly known as: PROVENTIL HFA;VENTOLIN HFA;PROAIR HFA  Inhale 2 puffs Every 4 (Four) Hours As Needed for Wheezing.  What changed: See the new instructions.     * albuterol 1.25 MG/3ML nebulizer solution  Commonly known as: ACCUNEB  Take 3 mL by nebulization Every 6 (Six) Hours As Needed for Wheezing.  What changed: You were already taking a medication with the same name, and this prescription was added. Make sure you understand how and when to take each.         * This list has 2 medication(s) that are the same as other medications prescribed for you. Read the directions carefully, and ask your doctor or other care provider to review them with you.               Where to Get Your Medications      These medications were sent to [facility]/pharmacy #[id number] - [address] - [phone]  - [phone] [address] [address]    Phone: [phone]   · albuterol 1.25 MG/3ML nebulizer solution  · albuterol sulfate  (90 Base) MCG/ACT inhaler          Rosanna Vásquez, APRN  01/21/23 [id number]

## 2023-01-21 LAB
QT INTERVAL: 356 MS
QTC INTERVAL: 449 MS

## 2023-02-17 ENCOUNTER — OFFICE VISIT (OUTPATIENT)
Dept: PULMONOLOGY | Facility: CLINIC | Age: 58
End: 2023-02-17
Payer: COMMERCIAL

## 2023-02-17 VITALS
HEIGHT: 67 IN | OXYGEN SATURATION: 99 % | DIASTOLIC BLOOD PRESSURE: 80 MMHG | HEART RATE: 90 BPM | BODY MASS INDEX: 29.35 KG/M2 | WEIGHT: 187 LBS | SYSTOLIC BLOOD PRESSURE: 132 MMHG

## 2023-02-17 DIAGNOSIS — J98.11 ATELECTASIS: ICD-10-CM

## 2023-02-17 DIAGNOSIS — Z87.09 HISTORY OF UPPER RESPIRATORY INFECTION: Primary | ICD-10-CM

## 2023-02-17 DIAGNOSIS — T78.40XA ALLERGIC REACTION, INITIAL ENCOUNTER: ICD-10-CM

## 2023-02-17 DIAGNOSIS — R07.89 CHEST WALL PAIN: ICD-10-CM

## 2023-02-17 DIAGNOSIS — R94.2 DIFFUSION CAPACITY OF LUNG (DL), DECREASED: ICD-10-CM

## 2023-02-17 PROCEDURE — 99214 OFFICE O/P EST MOD 30 MIN: CPT | Performed by: NURSE PRACTITIONER

## 2023-02-17 NOTE — PROGRESS NOTES
" DION Selby  [facility]   Pulmonary and Critical Care  [address]  [place] [address]  Phone: [phone]  Fax: [phone]           Chief Complaint  Pleural Effusion    Subjective    History of Present Illness     Lizbeth Rivera presents to [facility] PULMONARY & CRITICAL CARE MEDICINE   History of Present Illness  Ms. Rivera is a 57 year old female referred her by her PCP for pleural effusions. She has known Celiac disease, Barrettes esophagus, Asthma, seasonal allergies,stroke and follows with neurology for polyneuropathy. She follows with Dr. Parth Joya, Rheumatology and notes they have been working he up for possible Lupus or MS. She also follows with Dr. Monsalve, Asthma/ Allergy specialist and notes she is allergic to \"everything\". She states he told her she really had more allergies than true asthma. She is a never smoker. She reported her initial work up began prior to Dec for decreased exercise tolerance and was getting out of breath walking through the home. She also noted her face would get beat red. She also has had episodes of about 3 times a day where she would be drenched in sweat, light headed, pain in her neck/ throat and dizziness. Those episodes have improved. She has dealt with tachycardia for years where her heart rate will go as high as 172 bpm. This will often wake her up at night. She states that just recently put her on a new medication for this. For the last 3 days she has had allergy symptoms of rash/ redness of face and some tightness of her throat. She states she started Benadryl 3 days ago and on the second day she got a little worse. She reports this is how she does with her allergic reactions. Her stroke was in September and she presented with right sided weakness. She reports that she was very sick in Feb 2020 and is certain she had Covid.      She reports her more recent respiratory issues started in Dec where " "she had a head cold and was not severe. Then at New Years she had fever and body aches. She went to her PCP after a few days. She tested negative for Covid-19. She was given an inhaler. She then went to [facility] with complaints of pain felt to be bronchitis and pleurisy. She was also noted to have ear infections in both ears. She was given antibiotic shot and script as well as Steroid shot and script. Her ears were not causing her pain but she was coughing a lot. When she finished the scripts her cough improved. She then had bad pain and was exhausted. She had no trouble breathing just hurt bad. During the course of this she had 3 negative covid tests, a negative home test and states they tested her for viral, bacterial, and fungal infections that was all negative. She then presented to [facility] mid January with very intense pain. She was coughing hard and this was very painful. She again was advised she had pleurisy. She was given steroid shots and pills which did not offer any help. She had an old muscle relaxer at home she took and this helped a little. She ultimately was given Toradol for which she states offered her the most benefit. Her pain is much better but not resolved. If she lays down and then sits back up she is in pain on her left back flank and her upper back. If she leans over/ forward then she has chest heaviness and it feels like her heart \"plops\" forward and this too causes her pain. Her pain is reproduced with palpitation.     She had a CT of the spine at [facility] 1/26/2023 that showed a small left pleural effusion and LLL atelectasis as well as a very tiny right pleural effusion or possible pneumonia.  Her PFT in Dec 2022 showed no obstruction, no restriction and no significant response post bronchodilator. Her diffusion capacity was mildly decreased when corrected for alveolar volume. She was given a script for nebulizer and albuterol HFA for which she has not used. She denies " "fever, chills, night sweats. CTA in January showed no PE and again small effusions with bibasilar atelectasis. Her CXR on 2/6/2023 shwoed atelectasis in the lingula.        Objective   Vital Signs:   /80   Pulse 90   Ht 170.2 cm (67\")   Wt 84.8 kg (187 lb)   SpO2 99% Comment: RA  BMI 29.29 kg/m²     Physical Exam  Vitals reviewed.   Constitutional:       Appearance: Normal appearance.   Cardiovascular:      Rate and Rhythm: Normal rate and regular rhythm.   Pulmonary:      Effort: Pulmonary effort is normal.      Breath sounds: Normal breath sounds.   Skin:     Comments: Mild erythema of the chin and cheeks    Neurological:      General: No focal deficit present.      Mental Status: She is alert and oriented to person, place, and time.   Psychiatric:         Mood and Affect: Mood normal.         Behavior: Behavior normal.          Result Review :  The following data was reviewed by: DION Selby on 02/17/2023:    Data reviewed: Radiologic studies CTA    My interpretation of imaging:  As in HPI  My interpretation of labs: none   CT Angiogram Chest (01/18/2023 22:19)      My interpretation of the PFT: as in HPI    No results found for this or any previous visit.        Assessment and Plan   Diagnoses and all orders for this visit:    1. History of upper respiratory infection (Primary)    2. Atelectasis    3. Diffusion capacity of lung (dl), decreased    4. Chest wall pain    5. Allergic reaction, initial encounter      1. Allergic reaction- Initially advised to present to ER. She is not worse and has been taking Benadryl for 3 days. She has an Epi pen at home. She is advised that if her symptoms persist or worsen she needs to take her Epipen and present to the ER. She also needs to contact cardiology to notify them of the possible reaction the the new medication. She also has her rescue inhaler to use as needed.   2. Chest wall pain- she likely had some pleurisy as well as costochondritis. The " pain is reproducible with movement/ flexion as well as palpitation. The pain is better particularly after a course of Toradol.   3. Decreased diffusion capacity in the presence of normal lung function/ lung volumes can be related to anemia, peripheral vascular disease, early interstitial disease. Her repeat CXR showed some atelectasis of the ligula. No ILD.     She has upcoming appointments with Dr. Joya as well as Dr. Monsalve and she is encouraged to keep those. I have asked her to return in one month to follow up and see how she is doing. We could potentially check a HRCT given all her comorbidities and possible Lupus. We could also recheck a FVL with DLCO when she returns.       Follow Up   Return in about 4 weeks (around 3/17/2023).  Patient was given instructions and counseling regarding her condition or for health maintenance advice. Please see specific information pulled into the AVS if appropriate.     Alecia Rodriguez, APRN  2/17/2023  18:52 CST

## 2023-03-16 NOTE — PROGRESS NOTES
" DION Selby  [facility]   Pulmonary and Critical Care  [address] [place] [address]  Phone: [phone]  Fax: [phone]           Chief Complaint  History of upper respiratory infection    Subjective    History of Present Illness     Lizbeth Rivera presents to [facility] PULMONARY & CRITICAL CARE MEDICINE   History of Present Illness  Ms. Rivera is a 57 year old female referred at last visit by her PCP for pleural effusions. She has known Celiac disease, Barrettes esophagus, Asthma, seasonal allergies,stroke (right side weakness) and follows with neurology for polyneuropathy. She follows with Dr. Parth Joya, Rheumatology and notes they have been working he up for possible Lupus or MS. This work up was negative per patient. She also follows with Dr. Monsalve, Asthma/ Allergy specialist and notes she is allergic to \"everything\". She states he told her she really had more allergies than true asthma. She states they think she might be allergic to Avocado's. She has upcoming allergy testing. She had a CT of the spine at [facility] 1/26/2023 that showed a small left pleural effusion and LLL atelectasis as well as a very tiny right pleural effusion or possible pneumonia.  Her PFT in Dec 2022 showed no obstruction, no restriction and no significant response post bronchodilator. Her diffusion capacity was mildly decreased when corrected for alveolar volume.CTA in January showed no PE and again small effusions with bibasilar atelectasis. Her CXR on 2/6/2023 showed atelectasis in the lingula. We discussed her decreased diffusion capacity in the presence of normal lung function/ lung volumes can be related to anemia, peripheral vascular disease, early interstitial disease.  Her repeat CXR showed some atelectasis of the ligula but No ILD. She was asked to keep her follow up with Dr. Joya and Dr. Monsalve. Her FVL and DLCO today show normal. She " "continues to have some chest discomfort that is worse when she has to cough. She states Dr. Joya office also concurred she marcia had costochondritis.        Objective   Vital Signs:   /82   Pulse 79   Ht 170.2 cm (67\")   Wt 83.9 kg (185 lb)   SpO2 99% Comment: RA  BMI 28.98 kg/m²     Physical Exam  Vitals reviewed.   Constitutional:       Appearance: Normal appearance.   Cardiovascular:      Rate and Rhythm: Normal rate and regular rhythm.   Pulmonary:      Effort: Pulmonary effort is normal.      Breath sounds: Normal breath sounds.   Neurological:      General: No focal deficit present.      Mental Status: She is alert and oriented to person, place, and time.   Psychiatric:         Mood and Affect: Mood normal.         Behavior: Behavior normal.          Result Review :  The following data was reviewed by: DION Selby on 03/17/2023:    My interpretation of imaging:  No new   My interpretation of labs: No new     PFT Values        Some values may be hidden. Unless noted otherwise, only the newest values recorded on each date are displayed.         Old Values PFT Results 3/17/23   No data to display.      Pre Drug PFT Results 3/17/23      FEV1 113   FEF 25-75% 94   FEV1/FVC 76.29      Post Drug PFT Results 3/17/23   No data to display.      Other Tests PFT Results 3/17/23   DLCO 135   D/VAsb 116           My interpretation of the PFT: as below    Results for orders placed in visit on 03/17/23    Pulmonary Function Test    Narrative  Pulmonary Function Test  Performed by: Dylan Matute CMA  Authorized by: Alecia Rodriguez APRN    Pre Drug % Predicted  FVC: 120%  FEV1: 113%  FEF 25-75%: 94%  FEV1/FVC: 76.29%  DLCO: 135%  D/VAsb: 116%    Interpretation  Spirometry  Spirometry shows normal results. midflow is normal.  Review of FVL curve  Patient's effort is normal.  Diffusion Capacity  The patient's diffusion capacity is normal.  Diffusion capacity is normal when corrected " for alveolar volume.          Assessment and Plan   Diagnoses and all orders for this visit:    1. Shortness of breath (Primary)  -     Pulmonary Function Test    2. Costochondritis        Overall she is much improved. We discussed she may have some of the discomfort for several months. She will continue to follow up with Dr. Monsalve as well as Dr. Joya. She did have a CXR which showed interstitial changes however on her CT her lung fields were clear. I have asked her to return as needed and she is in agreement given her normal PFT.       Follow Up   Return if symptoms worsen or fail to improve.  Patient was given instructions and counseling regarding her condition or for health maintenance advice. Please see specific information pulled into the AVS if appropriate.     Alecia Rodriguez, APRN  3/17/2023  13:51 CDT

## 2023-03-17 ENCOUNTER — PROCEDURE VISIT (OUTPATIENT)
Dept: PULMONOLOGY | Facility: CLINIC | Age: 58
End: 2023-03-17
Payer: COMMERCIAL

## 2023-03-17 ENCOUNTER — OFFICE VISIT (OUTPATIENT)
Dept: PULMONOLOGY | Facility: CLINIC | Age: 58
End: 2023-03-17
Payer: COMMERCIAL

## 2023-03-17 VITALS
WEIGHT: 185 LBS | OXYGEN SATURATION: 99 % | HEART RATE: 79 BPM | DIASTOLIC BLOOD PRESSURE: 82 MMHG | SYSTOLIC BLOOD PRESSURE: 126 MMHG | BODY MASS INDEX: 29.03 KG/M2 | HEIGHT: 67 IN

## 2023-03-17 DIAGNOSIS — R06.02 SHORTNESS OF BREATH: Primary | ICD-10-CM

## 2023-03-17 DIAGNOSIS — M94.0 COSTOCHONDRITIS: ICD-10-CM

## 2023-03-17 DIAGNOSIS — R94.2 DIFFUSION CAPACITY OF LUNG (DL), DECREASED: Primary | ICD-10-CM

## 2023-03-17 PROCEDURE — 94729 DIFFUSING CAPACITY: CPT | Performed by: NURSE PRACTITIONER

## 2023-03-17 PROCEDURE — 94375 RESPIRATORY FLOW VOLUME LOOP: CPT | Performed by: NURSE PRACTITIONER

## 2023-03-17 PROCEDURE — 99214 OFFICE O/P EST MOD 30 MIN: CPT | Performed by: NURSE PRACTITIONER

## 2023-03-17 NOTE — PROCEDURES
Pulmonary Function Test  Performed by: Dylan Matute CMA  Authorized by: Alecia Rodriguez APRN      Pre Drug % Predicted    FVC: 120%   FEV1: 113%   FEF 25-75%: 94%   FEV1/FVC: 76.29%   DLCO: 135%   D/VAsb: 116%    Interpretation   Spirometry   Spirometry shows normal results. midflow is normal.  Review of FVL curve   Patient's effort is normal.   Diffusion Capacity  The patient's diffusion capacity is normal.  Diffusion capacity is normal when corrected for alveolar volume.

## 2023-04-21 NOTE — PROGRESS NOTES
Pt called in and aware of his results. YOB: 1965  Location: [facility]  Location Address: [address] [place] [address], [facility], [address] [place] [address]  Location Phone: [phone]    Chief Complaint   Patient presents with   • Follow-up       History of Present Illness  Lizbeth Rivera is a 55 y.o. female.  Lizbeth Rivera is here for follow up of ENT complaints. The patient has had problems with lymphadenopathy   The symptoms are localized to the bilateral neck, right worse than left. The patient has had moderate symptoms. The symptoms have been present for the last 7 years There have been no identified factors that aggravate the symptoms. The symptoms are improved by Clindamycin. Patient has sore throat, nasal congestion, postnasal drip, headache, sinus pressure, ear pressure, right ear pain, and lymph nodes of the breast and axilla. Patient states when she eats she has pain in her mouth and in the area of her parotid gland. Patient has severe allergies and neuropathy. She has had a CT scan since last visit.       She has a history of celiac disease and eats gluten-free.  She is allergic to PPI and therefore has to be on H2 blockers.  She states the lymph nodes in her neck as large as they did several weeks ago and then they regress and this has been going on for years.                  Past Medical History:   Diagnosis Date   • Abnormal ECG    • Colin's esophagus    • Celiac disease    • Neuropathy    • Polyneuropathy        Past Surgical History:   Procedure Laterality Date   • CHOLECYSTECTOMY     • TONSILLECTOMY         Outpatient Medications Marked as Taking for the 21 encounter (Office Visit) with Christopher Hurd MD   Medication Sig Dispense Refill   • albuterol sulfate  (90 Base) MCG/ACT inhaler albuterol sulfate HFA 90 mcg/actuation aerosol inhaler   INHALE 2 PUFFS EVERY 4 6 HOURS AS NEEDED FOR COUGH,WHEEZE, SHORTNESS OF BREATH AND CHEST TIGHTNESS     • azelastine (ASTELIN) 0.1 % nasal spray SPRAY 1 SPRAY INTO  EACH NOSTRIL TWICE A DAY     • B Complex Vitamins (B COMPLEX 1 PO) Take  by mouth.     • clindamycin (CLEOCIN T) 1 % external solution clindamycin phosphate 1 % topical solution   APPLY ONCE DAILY TO FACE AFTER SHOWER     • COLLAGEN-BORON-HYALURONIC ACID PO Take  by mouth.     • [employer] ALLERGY RELIEF-D12 5-120 MG per 12 hr tablet TAKE ONE TWICE A DAY AS NEEDED  2   • cycloSPORINE (RESTASIS) 0.05 % ophthalmic emulsion Apply  to eye(s) as directed by provider.     • EPINEPHrine (EPIPEN 2-FOSTER) 0.3 MG/0.3ML solution auto-injector injection EpiPen     • famotidine (PEPCID) 20 MG tablet Take 20 mg by mouth 2 (Two) Times a Day.     • ferrous sulfate 325 (65 FE) MG tablet Take 325 mg by mouth Daily With Breakfast.     • fexofenadine-pseudoephedrine (ALLEGRA-D 24) 180-240 MG per 24 hr tablet Take 1 tablet by mouth Daily.     • gabapentin (NEURONTIN) 300 MG capsule Take 600 mg by mouth 3 (Three) Times a Day.     • gabapentin (NEURONTIN) 600 MG tablet 600 mg Every 8 (Eight) Hours. 600, 300, 600     • hydrOXYzine (ATARAX) 25 MG tablet 50 mg.     • ibuprofen (ADVIL,MOTRIN) 200 MG tablet Take 200 mg by mouth.     • MAGNESIUM PO 2 (Two) Times a Day.     • MULTIPLE VITAMIN PO Take  by mouth.     • OMEGA 3-6-9 FATTY ACIDS PO omega 3,6,9 combination no.7   1600mg bid     • Omega-3 Fatty Acids (FISH OIL) 1000 MG capsule capsule Take  by mouth.     • Saline 0.9 % aerosol solution into the nostril(s) as directed by provider.     • sucralfate (CARAFATE) 1 g tablet TAKE 1 TABLET BY MOUTH ON AN EMPTY STOMACH THREE TIMES A DAY BEFORE MEALS ORALLY 30 DAY(S)  5   • Triamcinolone Acetonide (NASACORT ALLERGY 24HR) 55 MCG/ACT nasal inhaler into the nostril(s) as directed by provider.     • vitamin B-12 (CYANOCOBALAMIN) 1000 MCG tablet Take  by mouth Daily.     • VITAMIN D, CHOLECALCIFEROL, PO Take  by mouth.         Amoxicillin-pot clavulanate, Doxepin, Sulfamethoxazole-trimethoprim, Gluten meal, Glutethimides, Gemifloxacin, Latex, and Proton pump  inhibitors    Family History   Problem Relation Age of Onset   • Diabetes Maternal Grandmother    • Diabetes Maternal Grandfather        Social History     Socioeconomic History   • Marital status:      Spouse name: Not on file   • Number of children: Not on file   • Years of education: Not on file   • Highest education level: Not on file   Tobacco Use   • Smoking status: Never Smoker   • Smokeless tobacco: Never Used   Vaping Use   • Vaping Use: Never used   Substance and Sexual Activity   • Alcohol use: No   • Drug use: No   • Sexual activity: Defer       Review of Systems   HENT: Positive for congestion, ear pain, postnasal drip, sinus pressure and sore throat.         Lymphadenopathy   Eyes: Negative.    Respiratory: Negative.    Cardiovascular: Negative.    Gastrointestinal: Negative.    Endocrine: Negative.    Genitourinary: Negative.    Musculoskeletal: Negative.    Skin: Negative.    Allergic/Immunologic: Positive for environmental allergies.   Neurological: Positive for headaches.   Hematological: Negative.    Psychiatric/Behavioral: Negative.        Vitals:    05/04/21 1422   BP: 143/89   Pulse: 97   Temp: 98.2 °F (36.8 °C)       Body mass index is 27.57 kg/m².    Objective     Physical Exam  CONSTITUTIONAL: well nourished, well-developed, alert, oriented, in no acute distress     COMMUNICATION AND VOICE: able to communicate normally, normal voice quality    HEAD: normocephalic, no lesions, atraumatic, no tenderness, no masses     FACE: appearance normal, no lesions, no tenderness, no deformities, facial motion symmetric    EYES: ocular motility normal, eyelids normal, orbits normal, no proptosis, conjunctiva normal , pupils equal, round     EARS:  Hearing: hearing to conversational voice intact bilaterally   External Ears: normal bilaterally, no lesions  TMs  AS-clear and intact TM  AD-clear and intact TM    NOSE:  External Nose: external nasal structure normal, no tenderness on palpation, no nasal  discharge, no lesions, no evidence of trauma, nostrils patent     ORAL:  Lips: upper and lower lips without lesion   OC/OP-clear without purulent expression from Stensen's duct bilaterally on palpation    NECK:  Inspection and Palpation: neck appearance normal, no masses or tenderness in the left neck on palpation  1.5 to 2 cm node is noted at level 2 in the right neck.  Mild parotid tenderness is noted on the right without overlying erythema or induration.  No masses are appreciated in the right parotid.    CHEST/RESPIRATORY: normal respiratory effort     CARDIOVASCULAR: no cyanosis or edema     NEUROLOGICAL/PSYCHIATRIC: oriented to time, place and person, mood normal, affect appropriate, CN II-XII intact grossly    Assessment/Plan   Diagnoses and all orders for this visit:    1. Lymphadenopathy of head and neck (Primary)  Comments:  Right level 2 cervical  Orders:  -     Case Request; Standing  -     Comprehensive Metabolic Panel; Future  -     CBC (No Diff); Future  -     ECG 12 Lead; Future  -     XR Chest 2 View; Future    2. Gastroesophageal reflux disease without esophagitis  -     Case Request; Standing  -     Comprehensive Metabolic Panel; Future  -     CBC (No Diff); Future  -     ECG 12 Lead; Future  -     XR Chest 2 View; Future    3. Hx of parotitis  Comments:  Does have mild xerostomia intermittently  Orders:  -     Case Request; Standing  -     Comprehensive Metabolic Panel; Future  -     CBC (No Diff); Future  -     ECG 12 Lead; Future  -     XR Chest 2 View; Future    Other orders  -     Follow Anesthesia Guidelines / Protocol; Future  -     Obtain Informed Consent; Future  -     Follow Anesthesia Guidelines / Protocol; Standing  -     NPO Diet; Standing  -     Verify NPO Status; Standing  -     Obtain Informed Consent; Standing  -     SCD (Sequential Compression Device) - To Be Placed on Patient in Pre-Op; Standing      Excisional biopsy right level II cervical lymph node (Right)  Orders Placed  This Encounter   Procedures   • XR Chest 2 View     Standing Status:   Future     Standing Expiration Date:   5/4/2022     Order Specific Question:   Reason for Exam:     Answer:   Excisional biopsy right level II cervical lymph node     Order Specific Question:   Release to patient     Answer:   Immediate   • Comprehensive Metabolic Panel     Standing Status:   Future     Standing Expiration Date:   5/4/2022     Order Specific Question:   Release to patient     Answer:   Immediate   • CBC (No Diff)     Standing Status:   Future     Standing Expiration Date:   5/4/2022     Order Specific Question:   Release to patient     Answer:   Immediate   • Follow Anesthesia Guidelines / Protocol     Standing Status:   Future   • Obtain Informed Consent     Standing Status:   Future     Order Specific Question:   Informed Consent Given For     Answer:   Excisional biopsy right level II cervical lymph node   • ECG 12 Lead     Standing Status:   Future     Standing Expiration Date:   5/4/2022     Order Specific Question:   Reason for Exam:     Answer:   Excisional biopsy right level II cervical lymph node     Order Specific Question:   Release to patient     Answer:   Immediate     Return for postop.       Patient Instructions   Excisional biopsy right level II cervical lymph node   LYMPH NODE BIOPSY: The risks, benefits, and alternatives of the procedure including but not limited to pain, numbness, nerve injury, scarring, bleeding, infection, persistent symptoms, and risks of the anesthesia were discussed full with the patient and questions were answered. No guarantees were made or implied.      Patient and family were instructed on the proper use of scheduled prescription drugs including their impact on driving and the potential effects during pregnancy.  The potential for overdose was discussed and their safe storage and proper disposal.  The website www.[place].gov which contains other materials in this regard.          Warm  compresses with three to four times/day massage to affected side  Sialogogues - lemon three to four times/day  Take antibiotics as recommended  Drink plenty of fluids  Call for persistent or increased swelling, development of fever

## 2023-09-21 NOTE — PROGRESS NOTES
[facility] - PODIATRY    Today's Date: 09/29/2023     Patient Name: Lizbeth Rivera  MRN: [id number]  CSN: [id number]  PCP: Melvi Sanchez DO  Referring Provider: Cate Kelley AP*    SUBJECTIVE     Chief Complaint   Patient presents with    [facility]     Melvi Sanchez 05/23/2023 Pain in left foot.-pt states she is here today for pain in her left foot not caused from injury/has done PT-pt reports pain 3/10 today 6/10 after walking for long periods     HPI: Lizbeth Rivera, a 57 y.o.female, comes to clinic as a(n) new patient complaining of foot pain. Patient has h/o asthma, Colin's esophagus, celiac disease, CVA, and polyneuropathy . Patient is non-diabetic.  Patient relates numbness to both of her feet of unknown origin.  Relates that she has pain to both feet, mostly to the arch and heels.  Denies injury or trauma.  Relates that she has pain after periods of rest.  Notes that she primarily wears sandals.  Has not had previous imaging.  Admits pain at 3-6/10 level and described as aching, throbbing, and numbness. Denies previous treatment. Denies any constitutional symptoms. No other pedal complaints at this time.    Past Medical History:   Diagnosis Date    Abnormal ECG     Asthma     some form of     Colin's esophagus     Celiac disease     Cerebrovascular accident (CVA) 10/28/2022    Joint pain     Neuropathy     Polyneuropathy     Seasonal allergies     Sinus headache      Past Surgical History:   Procedure Laterality Date    CERVICAL LYMPH NODE BIOPSY/EXCISION Right 5/10/2021    Procedure: Excisional biopsy right level II cervical lymph node;  Surgeon: Christopher Hurd MD;  Location: [facility];  Service: ENT;  Laterality: Right;    CHOLECYSTECTOMY      TONSILLECTOMY       Family History   Problem Relation Age of Onset    Diabetes Maternal Grandmother     Diabetes Maternal Grandfather      Social History     Socioeconomic History    Marital status:     Tobacco Use    Smoking status: Never     Passive exposure: Never    Smokeless tobacco: Never   Vaping Use    Vaping Use: Never used   Substance and Sexual Activity    Alcohol use: No     Comment: very rare    Drug use: No    Sexual activity: Defer     Allergies   Allergen Reactions    Amoxicillin-Pot Clavulanate Shortness Of Breath    Clarithromycin Swelling     Facial swelling with rash    Doxepin Itching    Sulfamethoxazole-Trimethoprim Shortness Of Breath    Gluten Meal GI Intolerance    Glutethimides GI Intolerance    Gemifloxacin Palpitations    Latex Rash    Proton Pump Inhibitors Palpitations     Current Outpatient Medications   Medication Sig Dispense Refill    aspirin 81 MG chewable tablet aspirin 81 mg chewable tablet   Chew 1 tablet every day by oral route at bedtime.      COLLAGEN-BORON-HYALURONIC ACID PO Take 1 tablet by mouth Daily.      dilTIAZem CD (CARDIZEM CD) 120 MG 24 hr capsule TAKE 1 CAPSULE BY MOUTH EVERY DAY 90 capsule 3    famotidine (PEPCID) 20 MG tablet Take 1 tablet by mouth 2 (Two) Times a Day.      fexofenadine-pseudoephedrine (ALLEGRA-D 24) 180-240 MG per 24 hr tablet Take 1 tablet by mouth Daily.      gabapentin (NEURONTIN) 300 MG capsule Take 1 capsule by mouth Daily.      gabapentin (NEURONTIN) 600 MG tablet 1 tablet 2 (Two) Times a Day.      hydrOXYzine (ATARAX) 50 MG tablet Take 1 tablet by mouth Every Night.      MULTIPLE VITAMIN PO Take 1 tablet by mouth Daily.      sucralfate (CARAFATE) 1 g tablet TAKE 1 TABLET BY MOUTH ON AN EMPTY STOMACH THREE TIMES A DAY BEFORE MEALS ORALLY 30 DAY(S)  5    Triamcinolone Acetonide (NASACORT) 55 MCG/ACT nasal inhaler into the nostril(s) as directed by provider.      vitamin B-12 (CYANOCOBALAMIN) 100 MCG tablet Take 0.5 tablets by mouth.      VITAMIN D, CHOLECALCIFEROL, PO Take 1 tablet by mouth Daily.      ketoconazole (NIZORAL) 2 % shampoo USE 2 TO 3 TIMES A WEEK ON THE SCALP. LEAVE IN FOR 10 MINUTES AND THEN RINSE OUT.      selenium  sulfide (SELSUN) 2.5 % lotion selenium sulfide 2.5 % lotion   APPLY TO SCALP 2-3 TIMES A WEEK AS A SHAMPOO. (Patient not taking: Reported on 9/29/2023)       No current facility-administered medications for this visit.     Review of Systems   Constitutional:  Negative for chills and fever.   HENT:  Negative for congestion.    Respiratory:  Negative for shortness of breath.    Cardiovascular:  Negative for chest pain and leg swelling.   Gastrointestinal:  Negative for constipation, diarrhea, nausea and vomiting.   Musculoskeletal:         Foot pain   Skin:  Negative for wound.   Neurological:  Positive for numbness.     OBJECTIVE     Vitals:    09/29/23 1333   BP: 124/82   Pulse: 71   SpO2: 98%       PHYSICAL EXAM  GEN:   Accompanied by none.     Foot/Ankle Exam    GENERAL  Appearance:  appears stated age  Orientation:  AAOx3  Affect:  appropriate  Gait:  unimpaired  Assistance:  independent  Right shoe gear: sandal  Left shoe gear: sandal    VASCULAR     Right Foot Vascularity   Dorsalis pedis:  2+  Posterior tibial:  2+  Skin temperature:  warm  Edema grading:  Trace  CFT:  3  Pedal hair growth:  Present  Varicosities:  none     Left Foot Vascularity   Dorsalis pedis:  2+  Posterior tibial:  2+  Skin temperature:  warm  Edema grading:  Trace  CFT:  3  Pedal hair growth:  Present  Varicosities:  none     NEUROLOGIC     Right Foot Neurologic   Light touch sensation: diminished  Vibratory sensation: diminished  Hot/Cold sensation: diminished  Protective Sensation using [place] C Monofilament:   Sites intact: 8  Sites tested: 10     Left Foot Neurologic   Light touch sensation: diminished  Vibratory sensation: diminished  Hot/Cold sensation:  diminished  Protective Sensation using [place] C Monofilament:   Sites intact: 8  Sites tested: 10    MUSCULOSKELETAL     Right Foot Musculoskeletal   Ecchymosis:  none  Tenderness:  plantar arch tenderness and plantar fascia tenderness    Arch:  Normal     Left Foot  Musculoskeletal   Ecchymosis:  none  Tenderness:  plantar arch tenderness and plantar fascia tenderness  Arch:  Normal    MUSCLE STRENGTH     Right Foot Muscle Strength   Foot dorsiflexion:  5  Foot plantar flexion:  5  Foot inversion:  5  Foot eversion:  5     Left Foot Muscle Strength   Foot dorsiflexion:  5  Foot plantar flexion:  5  Foot inversion:  5  Foot eversion:  5    RANGE OF MOTION     Right Foot Range of Motion   Foot and ankle ROM within normal limits    Ankle dorsiflexion: 5 decreased      Left Foot Range of Motion   Foot and ankle ROM within normal limits    Ankle dorsiflexion: 5 decreased    DERMATOLOGIC      Right Foot Dermatologic   Skin  Right foot skin is intact.      Left Foot Dermatologic   Skin  Left foot skin is intact.     RADIOLOGY/NUCLEAR:  XR Foot 3+ View Bilateral    Result Date: 9/29/2023  Narrative: EXAMINATION: XR FOOT 3+ VW BILATERAL- 9/29/2023 4:00 PM CDT  HISTORY: foot pain; M79.672-Pain in left foot.  REPORT: 3 weightbearing views of both feet were obtained.  COMPARISON: There are no correlative imaging studies for comparison.  On the right, osseous alignment is normal, no fracture is identified. The joint spaces are preserved. There is mild overgrowth of the first metatarsal head at its medial margin. The Lisfranc joint complex is preserved. No significant pes planus deformity on the lateral weightbearing view.  Images of the left foot demonstrate normal osseous alignment, no fracture is identified. The joint spaces are preserved. There is mild overgrowth of the first metatarsal head. The Lisfranc joint complex is preserved. There is a small accessory ossicle adjacent to the cuboid bone. An inferior calcaneal enthesophyte is present. No significant pes planus deformity is seen on the lateral weightbearing view.      Impression: No acute osseous abnormality. Mild overgrowth of the first metatarsal head bilaterally. No significant joint space narrowing. There is a small inferior  calcaneal enthesophyte on the left.  This report was signed and finalized on 9/29/2023 4:03 PM CDT by Dr. Kashmir Beltran MD.       LABORATORY/CULTURE RESULTS:      PATHOLOGY RESULTS:       ASSESSMENT/PLAN     Diagnoses and all orders for this visit:    1. Plantar fasciitis, left (Primary)    2. Plantar fasciitis, right    3. Foot pain, left  -     XR Foot 3+ View Bilateral; Future    4. Idiopathic peripheral neuropathy    5. Gastrocnemius equinus, left    6. Gastrocnemius equinus, right      Comprehensive lower extremity examination and evaluation was performed.  Discussed findings and treatment plan including risks, benefits, and treatment options with patient in detail. Patient agreed with treatment plan.  Conservative therapy including daily stretching (demonstrated proper way of performing), icing 3x daily, decreased activity, and nsaids PRN.   Discussed utilization of cushioned arch supports.  We will order x-rays for further evaluation of foot structure.  Discussed potential surgical options if conservative therapies fail.  An After Visit Summary was printed and given to the patient at discharge, including (if requested) any available informative/educational handouts regarding diagnosis, treatment, or medications. All questions were answered to patient/family satisfaction. Should symptoms fail to improve or worsen they agree to call or return to clinic or to go to the Emergency Department. Discussed the importance of following up with any needed screening tests/labs/specialist appointments and any requested follow-up recommended by me today. Importance of maintaining follow-up discussed and patient accepts that missed appointments can delay diagnosis and potentially lead to worsening of conditions.  Return in about 2 months (around 11/29/2023) for Recheck., or sooner if acute issues arise.    Lab Frequency Next Occurrence   Follow Anesthesia Guidelines / Protocol Once 05/04/2021   Obtain Informed Consent Once  05/09/2021   CT Soft Tissue Neck With & Without Contrast Once 10/18/2023   Allergens (10) Fish Once 11/14/2023   Allergens (19) Once 11/14/2023   Allergens (21) Pollens Once 11/14/2023   Allergens (22) Foods Once 11/14/2023   Allergens (22) Vegetables Once 11/14/2023   Allergens (5) Meat Once 11/14/2023   Allergens (7) Insects Once 11/14/2023   Allergens (9) Dairy / Egg Once 11/14/2023       This document has been electronically signed by Jae Tijerina DPM on September 29, 2023 17:16 CDT

## 2023-09-25 RX ORDER — DILTIAZEM HYDROCHLORIDE 120 MG/1
CAPSULE, COATED, EXTENDED RELEASE ORAL
Qty: 90 CAPSULE | Refills: 3 | Status: SHIPPED | OUTPATIENT
Start: 2023-09-25

## 2023-09-27 LAB
ALBUMIN SERPL-MCNC: 4.2 G/DL (ref 3.5–5.2)
ALP SERPL-CCNC: 109 U/L (ref 35–104)
ALT SERPL-CCNC: 20 U/L (ref 5–33)
ANION GAP SERPL CALCULATED.3IONS-SCNC: 15 MMOL/L (ref 7–19)
AST SERPL-CCNC: 25 U/L (ref 5–32)
BACTERIA URNS QL MICRO: NEGATIVE /HPF
BASOPHILS # BLD: 0 K/UL (ref 0–0.2)
BASOPHILS NFR BLD: 0.3 % (ref 0–1)
BILIRUB SERPL-MCNC: 0.6 MG/DL (ref 0.2–1.2)
BILIRUB UR QL STRIP: NEGATIVE
BUN SERPL-MCNC: 10 MG/DL (ref 6–20)
CALCIUM SERPL-MCNC: 9.5 MG/DL (ref 8.6–10)
CHLORIDE SERPL-SCNC: 105 MMOL/L (ref 98–111)
CHOLEST SERPL-MCNC: 117 MG/DL (ref 160–199)
CLARITY UR: CLEAR
CO2 SERPL-SCNC: 23 MMOL/L (ref 22–29)
COLOR UR: YELLOW
CREAT SERPL-MCNC: 0.8 MG/DL (ref 0.5–0.9)
CRYSTALS URNS MICRO: ABNORMAL /HPF
EOSINOPHIL # BLD: 0.2 K/UL (ref 0–0.6)
EOSINOPHIL NFR BLD: 3.3 % (ref 0–5)
EPI CELLS #/AREA URNS AUTO: 3 /HPF (ref 0–5)
ERYTHROCYTE [DISTWIDTH] IN BLOOD BY AUTOMATED COUNT: 13.1 % (ref 11.5–14.5)
GLUCOSE SERPL-MCNC: 95 MG/DL (ref 74–109)
GLUCOSE UR STRIP.AUTO-MCNC: NEGATIVE MG/DL
HBV CORE IGM SERPL QL IA: NORMAL
HBV SURFACE AB SERPL IA-ACNC: NORMAL M[IU]/ML
HBV SURFACE AG SERPL QL IA: NORMAL
HCT VFR BLD AUTO: 46 % (ref 37–47)
HCV AB SERPL QL IA: NORMAL
HDLC SERPL-MCNC: 48 MG/DL (ref 65–121)
HGB BLD-MCNC: 14.4 G/DL (ref 12–16)
HGB UR STRIP.AUTO-MCNC: NEGATIVE MG/L
HIV-1 P24 AG: NORMAL
HIV1+2 AB SERPLBLD QL IA.RAPID: NORMAL
HYALINE CASTS #/AREA URNS AUTO: 9 /HPF (ref 0–8)
IMM GRANULOCYTES # BLD: 0 K/UL
KETONES UR STRIP.AUTO-MCNC: ABNORMAL MG/DL
LDLC SERPL CALC-MCNC: 55 MG/DL
LEUKOCYTE ESTERASE UR QL STRIP.AUTO: ABNORMAL
LYMPHOCYTES # BLD: 1.6 K/UL (ref 1.1–4.5)
LYMPHOCYTES NFR BLD: 25.6 % (ref 20–40)
MCH RBC QN AUTO: 28.4 PG (ref 27–31)
MCHC RBC AUTO-ENTMCNC: 31.3 G/DL (ref 33–37)
MCV RBC AUTO: 90.7 FL (ref 81–99)
MONOCYTES # BLD: 0.3 K/UL (ref 0–0.9)
MONOCYTES NFR BLD: 5.4 % (ref 0–10)
NEUTROPHILS # BLD: 4 K/UL (ref 1.5–7.5)
NEUTS SEG NFR BLD: 65.2 % (ref 50–65)
NITRITE UR QL STRIP.AUTO: NEGATIVE
PH UR STRIP.AUTO: 6.5 [PH] (ref 5–8)
PLATELET # BLD AUTO: 252 K/UL (ref 130–400)
PMV BLD AUTO: 10.3 FL (ref 9.4–12.3)
POTASSIUM SERPL-SCNC: 4.2 MMOL/L (ref 3.5–5)
PROT SERPL-MCNC: 7.4 G/DL (ref 6.6–8.7)
PROT UR STRIP.AUTO-MCNC: NEGATIVE MG/DL
RBC # BLD AUTO: 5.07 M/UL (ref 4.2–5.4)
RBC #/AREA URNS AUTO: 17 /HPF (ref 0–4)
SODIUM SERPL-SCNC: 143 MMOL/L (ref 136–145)
SP GR UR STRIP.AUTO: 1.02 (ref 1–1.03)
TRIGL SERPL-MCNC: 68 MG/DL (ref 0–149)
UROBILINOGEN UR STRIP.AUTO-MCNC: 1 E.U./DL
WBC # BLD AUTO: 6.1 K/UL (ref 4.8–10.8)
WBC #/AREA URNS AUTO: 2 /HPF (ref 0–5)

## 2023-09-28 ENCOUNTER — TELEPHONE (OUTPATIENT)
Dept: PODIATRY | Facility: CLINIC | Age: 58
End: 2023-09-28
Payer: COMMERCIAL

## 2023-09-29 ENCOUNTER — HOSPITAL ENCOUNTER (OUTPATIENT)
Dept: GENERAL RADIOLOGY | Facility: HOSPITAL | Age: 58
Discharge: HOME | End: 2023-09-29
Admitting: PODIATRIST
Payer: COMMERCIAL

## 2023-09-29 ENCOUNTER — OFFICE VISIT (OUTPATIENT)
Dept: PODIATRY | Facility: CLINIC | Age: 58
End: 2023-09-29
Payer: COMMERCIAL

## 2023-09-29 VITALS
BODY MASS INDEX: 28.88 KG/M2 | WEIGHT: 184 LBS | SYSTOLIC BLOOD PRESSURE: 124 MMHG | OXYGEN SATURATION: 98 % | DIASTOLIC BLOOD PRESSURE: 82 MMHG | HEART RATE: 71 BPM | HEIGHT: 67 IN

## 2023-09-29 DIAGNOSIS — M72.2 PLANTAR FASCIITIS, RIGHT: ICD-10-CM

## 2023-09-29 DIAGNOSIS — M62.462 GASTROCNEMIUS EQUINUS, LEFT: ICD-10-CM

## 2023-09-29 DIAGNOSIS — G60.9 IDIOPATHIC PERIPHERAL NEUROPATHY: ICD-10-CM

## 2023-09-29 DIAGNOSIS — M79.672 FOOT PAIN, LEFT: ICD-10-CM

## 2023-09-29 DIAGNOSIS — M72.2 PLANTAR FASCIITIS, LEFT: Primary | ICD-10-CM

## 2023-09-29 DIAGNOSIS — M62.461 GASTROCNEMIUS EQUINUS, RIGHT: ICD-10-CM

## 2023-09-29 PROBLEM — J30.2 SEASONAL ALLERGIES: Status: ACTIVE | Noted: 2023-09-29

## 2023-09-29 PROBLEM — J31.0 CHRONIC RHINITIS: Status: ACTIVE | Noted: 2021-04-21

## 2023-09-29 PROBLEM — M35.00 SICCA: Status: ACTIVE | Noted: 2023-09-29

## 2023-09-29 PROBLEM — G62.2: Status: ACTIVE | Noted: 2023-09-29

## 2023-09-29 PROBLEM — I47.10 PAROXYSMAL SUPRAVENTRICULAR TACHYCARDIA: Status: ACTIVE | Noted: 2023-09-29

## 2023-09-29 PROBLEM — R94.31 ABNORMAL ECG: Status: ACTIVE | Noted: 2023-09-29

## 2023-09-29 PROBLEM — M79.7 FIBROMYALGIA: Status: ACTIVE | Noted: 2017-09-22

## 2023-09-29 PROBLEM — E53.8 VITAMIN B12 DEFICIENCY: Status: ACTIVE | Noted: 2017-09-22

## 2023-09-29 PROBLEM — J45.909 ASTHMA: Status: ACTIVE | Noted: 2023-09-29

## 2023-09-29 PROBLEM — M25.50 JOINT PAIN: Status: ACTIVE | Noted: 2023-09-29

## 2023-09-29 PROBLEM — K22.70 BARRETT'S ESOPHAGUS: Status: ACTIVE | Noted: 2023-09-29

## 2023-09-29 PROBLEM — Z00.00 ANNUAL PHYSICAL EXAM: Status: ACTIVE | Noted: 2023-09-29

## 2023-09-29 PROBLEM — K21.00 ESOPHAGITIS, REFLUX: Status: ACTIVE | Noted: 2023-09-29

## 2023-09-29 PROBLEM — R51.9 SINUS HEADACHE: Status: ACTIVE | Noted: 2023-09-29

## 2023-09-29 PROCEDURE — 73630 X-RAY EXAM OF FOOT: CPT

## 2023-09-29 RX ORDER — KETOCONAZOLE 20 MG/ML
SHAMPOO TOPICAL
COMMUNITY

## 2023-09-30 LAB
GAMMA INTERFERON BACKGROUND BLD IA-ACNC: 0.01 IU/ML
MITOGEN IGNF BCKGRD COR BLD-ACNC: 4.9 IU/ML
QUANTI TB GOLD PLUS: NEGATIVE
QUANTI TB1 MINUS NIL: 0.01 IU/ML (ref 0–0.34)
QUANTI TB2 MINUS NIL: 0.01 IU/ML (ref 0–0.34)

## 2023-10-18 RX ORDER — METOPROLOL SUCCINATE 25 MG/1
25 TABLET, EXTENDED RELEASE ORAL DAILY
Qty: 90 TABLET | Refills: 0 | Status: SHIPPED | OUTPATIENT
Start: 2023-10-18

## 2023-10-27 ENCOUNTER — OFFICE VISIT (OUTPATIENT)
Dept: CARDIOLOGY | Facility: CLINIC | Age: 58
End: 2023-10-27
Payer: COMMERCIAL

## 2023-10-27 VITALS
BODY MASS INDEX: 29.82 KG/M2 | DIASTOLIC BLOOD PRESSURE: 86 MMHG | OXYGEN SATURATION: 98 % | SYSTOLIC BLOOD PRESSURE: 128 MMHG | WEIGHT: 190 LBS | HEIGHT: 67 IN | HEART RATE: 73 BPM

## 2023-10-27 DIAGNOSIS — I47.10 PAROXYSMAL SVT (SUPRAVENTRICULAR TACHYCARDIA): Primary | ICD-10-CM

## 2023-10-27 DIAGNOSIS — R06.02 SHORTNESS OF BREATH: ICD-10-CM

## 2023-10-27 DIAGNOSIS — R53.81 PHYSICAL DECONDITIONING: ICD-10-CM

## 2023-10-27 PROCEDURE — 93000 ELECTROCARDIOGRAM COMPLETE: CPT | Performed by: INTERNAL MEDICINE

## 2023-10-27 PROCEDURE — 99214 OFFICE O/P EST MOD 30 MIN: CPT | Performed by: INTERNAL MEDICINE

## 2023-10-27 RX ORDER — ATORVASTATIN CALCIUM 20 MG/1
20 TABLET, FILM COATED ORAL DAILY
COMMUNITY

## 2023-10-27 NOTE — PROGRESS NOTES
Reason for Visit: cardiovascular follow up.    HPI:  Lizbeth Rivera is a 57 y.o. female is here today for follow-up.  She was initially seen in cardiology clinic on 6/5/2020 for evaluation of chest pain and shortness of breath.  Stress echo was done on 6/16/2020 and was low risk for ischemia.  BNP was also ordered and was within normal limits.    She was seen back in cardiology clinic on 10/28/2022 by Rolly Mcnulty for palpitations.  Repeat Holter monitor was done just prior to this on 10/4/2022 with results noted below.  She was started on metoprolol for paroxysmal SVT and scheduled for 6-month follow-up, which she canceled and has been rescheduled for today.     She has been waking up with flushing in her face.  She had one episode about a year ago where she lost bowel and bladder and was having repeated episodes of near syncope.  She was unable to move her right arm.  She was able to move her right arm after about 2 hours, but it remained weak for about 4 days.  She has been following with neurology and had a number of scans done.  She continues to have dizziness episodes.  She has been diagnosed with plaque psoriasis and her dermatologist is trying to get her on a new medication.      She gets out of breath when she exerts herself and will get dizzy.  She reports being out of shape and weighs more now than she has.  She is eating less but has not done any form of aerobic exercise.  Her palpitations and tachycardia have improved with diltiazem.      Previous Cardiac Testing and Procedures:  -Holter monitor (3/30/2015) rare atrial ectopic beats with a 6 beat run of nonsustained SVT, no significant ventricular arrhythmias, palpitations correlate with SVT  -Echo (4/1/2015) EF 50-55%, normal diastolic function, normal LA and RA size, normal RV size and function, mild TR  -EKG treadmill stress (5/11/2015) average functional capacity, negative for ischemia  -Stress echo (6/16/2020) average functional capacity,  electrically negative, chest pain and shortness of breath reported with exercise, normal wall motion at rest and stress, low risk  -Holter monitor (10/4/2022) rare PACs and PVCs, 5 runs of nonsustained SVT up to 17 seconds, symptoms correlated with sinus rhythm and SVT  -Echo (10/12/2022) EF 50-55%, normal RV size and function, normal LA and RA, mild MR, no evidence of shunt    Lab data:  -proBNP (6/5/2020) 29, normal 5-900  -Lipid panel (9/27/2023) total cholesterol 117, HDL 48, LDL 55, triglycerides 68  -BMP (9/27/2023) creatinine 0.8, GFR > 60, potassium 4.2, sodium 143    Patient Active Problem List   Diagnosis    Peripheral neuropathy    GERD (gastroesophageal reflux disease)    Celiac disease    Hx of parotitis    Lymphadenopathy of head and neck    Gastroesophageal reflux disease without esophagitis    Cerebrovascular accident (CVA)    Abnormal ECG    Asthma    Colin's esophagus    Joint pain    Seasonal allergies    Sinus headache    Annual physical exam    Chronic rhinitis    Esophagitis, reflux    Fibromyalgia    Pain in both lower legs    Paresthesia    Paroxysmal supraventricular tachycardia    [place]    Toxic polyneuropathy    Vitamin B12 deficiency    Weakness       Social History     Tobacco Use    Smoking status: Never     Passive exposure: Never    Smokeless tobacco: Never   Vaping Use    Vaping Use: Never used   Substance Use Topics    Alcohol use: No     Comment: very rare    Drug use: No       Family History   Problem Relation Age of Onset    Diabetes Maternal Grandmother     Diabetes Maternal Grandfather        The following portions of the patient's history were reviewed and updated as appropriate: allergies, current medications, past family history, past medical history, past social history, past surgical history, and problem list.      Current Outpatient Medications:     aspirin 81 MG chewable tablet, aspirin 81 mg chewable tablet  Chew 1 tablet every day by oral route at bedtime., Disp: ,  Rfl:     atorvastatin (LIPITOR) 20 MG tablet, Take 1 tablet by mouth Daily., Disp: , Rfl:     COLLAGEN-BORON-HYALURONIC ACID PO, Take 1 tablet by mouth Daily., Disp: , Rfl:     dilTIAZem CD (CARDIZEM CD) 120 MG 24 hr capsule, TAKE 1 CAPSULE BY MOUTH EVERY DAY, Disp: 90 capsule, Rfl: 3    famotidine (PEPCID) 20 MG tablet, Take 1 tablet by mouth 2 (Two) Times a Day., Disp: , Rfl:     fexofenadine-pseudoephedrine (ALLEGRA-D 24) 180-240 MG per 24 hr tablet, Take 1 tablet by mouth Daily., Disp: , Rfl:     gabapentin (NEURONTIN) 300 MG capsule, Take 1 capsule by mouth Daily., Disp: , Rfl:     gabapentin (NEURONTIN) 600 MG tablet, 1 tablet 2 (Two) Times a Day., Disp: , Rfl:     hydrOXYzine (ATARAX) 50 MG tablet, Take 1 tablet by mouth Every Night., Disp: , Rfl:     ketoconazole (NIZORAL) 2 % shampoo, USE 2 TO 3 TIMES A WEEK ON THE SCALP. LEAVE IN FOR 10 MINUTES AND THEN RINSE OUT., Disp: , Rfl:     MULTIPLE VITAMIN PO, Take 1 tablet by mouth Daily., Disp: , Rfl:     selenium sulfide (SELSUN) 2.5 % lotion, , Disp: , Rfl:     sucralfate (CARAFATE) 1 g tablet, TAKE 1 TABLET BY MOUTH ON AN EMPTY STOMACH THREE TIMES A DAY BEFORE MEALS ORALLY 30 DAY(S), Disp: , Rfl: 5    Triamcinolone Acetonide (NASACORT) 55 MCG/ACT nasal inhaler, into the nostril(s) as directed by provider., Disp: , Rfl:     vitamin B-12 (CYANOCOBALAMIN) 100 MCG tablet, Take 0.5 tablets by mouth., Disp: , Rfl:     VITAMIN D, CHOLECALCIFEROL, PO, Take 1 tablet by mouth Daily., Disp: , Rfl:     Review of Systems   Constitutional: Negative for chills and fever.   Cardiovascular:  Positive for chest pain and dyspnea on exertion. Negative for paroxysmal nocturnal dyspnea.   Respiratory:  Positive for shortness of breath. Negative for cough.    Skin:  Positive for flushing, itching and rash.   Gastrointestinal:  Negative for abdominal pain and heartburn.   Neurological:  Positive for dizziness and headaches. Negative for numbness.       Objective   /86 (BP  "Location: Left arm, Patient Position: Sitting, Cuff Size: Adult)   Pulse 73   Ht 170.2 cm (67.01\")   Wt 86.2 kg (190 lb)   SpO2 98%   BMI 29.75 kg/m²   Constitutional:       Appearance: Well-developed.   HENT:      Head: Normocephalic and atraumatic.   Pulmonary:      Effort: Pulmonary effort is normal.      Breath sounds: Normal breath sounds.   Cardiovascular:      Normal rate. Regular rhythm.   Edema:     Peripheral edema absent.   Skin:     General: Skin is warm and dry.   Neurological:      Mental Status: Alert and oriented to person, place, and time.         ECG 12 Lead    Date/Time: 10/27/2023 10:25 AM  Performed by: Micha Greer MD    Authorized by: Micha Greer MD  Comparison: compared with previous ECG from 1/18/2023  Comparison to previous ECG: RBBB is no longer present  Rhythm: sinus rhythm  Rate: normal    Clinical impression: normal ECG            ICD-10-CM ICD-9-CM   1. Paroxysmal SVT (supraventricular tachycardia)  I47.10 427.0   2. Shortness of breath  R06.02 786.05   3. Physical deconditioning  R53.81 799.3         Assessment/Plan:  1.  Paroxysmal SVT: Noted on Holter monitor from 10/4/2022.  Symptoms are recently improved with diltiazem and this should be continued.     2.  Shortness of breath: No evidence of any cardiac etiology based on prior testing.  Physical deconditioning is likely contributing.     3.  Physical deconditioning: Counseled on the importance of regular exercise.  "

## 2023-10-27 NOTE — LETTER
October 30, 2023     Melvi Sanchez DO  [address] [place] [address]    Patient: Lizbeth Rivera   YOB: 1965   Date of Visit: 10/27/2023       Dear Melvi Sanchez DO    Lizbeth Rivera was in my office today. Below is a copy of my note.    If you have questions, please do not hesitate to call me. I look forward to following Lizbeth along with you.         Sincerely,        Micha Greer MD        CC: No Recipients      Reason for Visit: cardiovascular follow up.    HPI:  Lizbeth Rivera is a 57 y.o. female is here today for follow-up.  She was initially seen in cardiology clinic on 6/5/2020 for evaluation of chest pain and shortness of breath.  Stress echo was done on 6/16/2020 and was low risk for ischemia.  BNP was also ordered and was within normal limits.    She was seen back in cardiology clinic on 10/28/2022 by Rolly Mcnulty for palpitations.  Repeat Holter monitor was done just prior to this on 10/4/2022 with results noted below.  She was started on metoprolol for paroxysmal SVT and scheduled for 6-month follow-up, which she canceled and has been rescheduled for today.     She has been waking up with flushing in her face.  She had one episode about a year ago where she lost bowel and bladder and was having repeated episodes of near syncope.  She was unable to move her right arm.  She was able to move her right arm after about 2 hours, but it remained weak for about 4 days.  She has been following with neurology and had a number of scans done.  She continues to have dizziness episodes.  She has been diagnosed with plaque psoriasis and her dermatologist is trying to get her on a new medication.      She gets out of breath when she exerts herself and will get dizzy.  She reports being out of shape and weighs more now than she has.  She is eating less but has not done any form of aerobic exercise.  Her palpitations and tachycardia have improved with diltiazem.       Previous Cardiac Testing and Procedures:  -Holter monitor (3/30/2015) rare atrial ectopic beats with a 6 beat run of nonsustained SVT, no significant ventricular arrhythmias, palpitations correlate with SVT  -Echo (4/1/2015) EF 50-55%, normal diastolic function, normal LA and RA size, normal RV size and function, mild TR  -EKG treadmill stress (5/11/2015) average functional capacity, negative for ischemia  -Stress echo (6/16/2020) average functional capacity, electrically negative, chest pain and shortness of breath reported with exercise, normal wall motion at rest and stress, low risk  -Holter monitor (10/4/2022) rare PACs and PVCs, 5 runs of nonsustained SVT up to 17 seconds, symptoms correlated with sinus rhythm and SVT  -Echo (10/12/2022) EF 50-55%, normal RV size and function, normal LA and RA, mild MR, no evidence of shunt    Lab data:  -proBNP (6/5/2020) 29, normal 5-900  -Lipid panel (9/27/2023) total cholesterol 117, HDL 48, LDL 55, triglycerides 68  -BMP (9/27/2023) creatinine 0.8, GFR > 60, potassium 4.2, sodium 143    Patient Active Problem List   Diagnosis   • Peripheral neuropathy   • GERD (gastroesophageal reflux disease)   • Celiac disease   • Hx of parotitis   • Lymphadenopathy of head and neck   • Gastroesophageal reflux disease without esophagitis   • Cerebrovascular accident (CVA)   • Abnormal ECG   • Asthma   • Colin's esophagus   • Joint pain   • Seasonal allergies   • Sinus headache   • Annual physical exam   • Chronic rhinitis   • Esophagitis, reflux   • Fibromyalgia   • Pain in both lower legs   • Paresthesia   • Paroxysmal supraventricular tachycardia   • Sicca   • Toxic polyneuropathy   • Vitamin B12 deficiency   • Weakness       Social History     Tobacco Use   • Smoking status: Never     Passive exposure: Never   • Smokeless tobacco: Never   Vaping Use   • Vaping Use: Never used   Substance Use Topics   • Alcohol use: No     Comment: very rare   • Drug use: No       Family History    Problem Relation Age of Onset   • Diabetes Maternal Grandmother    • Diabetes Maternal Grandfather        The following portions of the patient's history were reviewed and updated as appropriate: allergies, current medications, past family history, past medical history, past social history, past surgical history, and problem list.      Current Outpatient Medications:   •  aspirin 81 MG chewable tablet, aspirin 81 mg chewable tablet  Chew 1 tablet every day by oral route at bedtime., Disp: , Rfl:   •  atorvastatin (LIPITOR) 20 MG tablet, Take 1 tablet by mouth Daily., Disp: , Rfl:   •  COLLAGEN-BORON-HYALURONIC ACID PO, Take 1 tablet by mouth Daily., Disp: , Rfl:   •  dilTIAZem CD (CARDIZEM CD) 120 MG 24 hr capsule, TAKE 1 CAPSULE BY MOUTH EVERY DAY, Disp: 90 capsule, Rfl: 3  •  famotidine (PEPCID) 20 MG tablet, Take 1 tablet by mouth 2 (Two) Times a Day., Disp: , Rfl:   •  fexofenadine-pseudoephedrine (ALLEGRA-D 24) 180-240 MG per 24 hr tablet, Take 1 tablet by mouth Daily., Disp: , Rfl:   •  gabapentin (NEURONTIN) 300 MG capsule, Take 1 capsule by mouth Daily., Disp: , Rfl:   •  gabapentin (NEURONTIN) 600 MG tablet, 1 tablet 2 (Two) Times a Day., Disp: , Rfl:   •  hydrOXYzine (ATARAX) 50 MG tablet, Take 1 tablet by mouth Every Night., Disp: , Rfl:   •  ketoconazole (NIZORAL) 2 % shampoo, USE 2 TO 3 TIMES A WEEK ON THE SCALP. LEAVE IN FOR 10 MINUTES AND THEN RINSE OUT., Disp: , Rfl:   •  MULTIPLE VITAMIN PO, Take 1 tablet by mouth Daily., Disp: , Rfl:   •  selenium sulfide (SELSUN) 2.5 % lotion, , Disp: , Rfl:   •  sucralfate (CARAFATE) 1 g tablet, TAKE 1 TABLET BY MOUTH ON AN EMPTY STOMACH THREE TIMES A DAY BEFORE MEALS ORALLY 30 DAY(S), Disp: , Rfl: 5  •  Triamcinolone Acetonide (NASACORT) 55 MCG/ACT nasal inhaler, into the nostril(s) as directed by provider., Disp: , Rfl:   •  vitamin B-12 (CYANOCOBALAMIN) 100 MCG tablet, Take 0.5 tablets by mouth., Disp: , Rfl:   •  VITAMIN D, CHOLECALCIFEROL, PO, Take 1  "tablet by mouth Daily., Disp: , Rfl:     Review of Systems   Constitutional: Negative for chills and fever.   Cardiovascular:  Positive for chest pain and dyspnea on exertion. Negative for paroxysmal nocturnal dyspnea.   Respiratory:  Positive for shortness of breath. Negative for cough.    Skin:  Positive for flushing, itching and rash.   Gastrointestinal:  Negative for abdominal pain and heartburn.   Neurological:  Positive for dizziness and headaches. Negative for numbness.       Objective  /86 (BP Location: Left arm, Patient Position: Sitting, Cuff Size: Adult)   Pulse 73   Ht 170.2 cm (67.01\")   Wt 86.2 kg (190 lb)   SpO2 98%   BMI 29.75 kg/m²   Constitutional:       Appearance: Well-developed.   HENT:      Head: Normocephalic and atraumatic.   Pulmonary:      Effort: Pulmonary effort is normal.      Breath sounds: Normal breath sounds.   Cardiovascular:      Normal rate. Regular rhythm.   Edema:     Peripheral edema absent.   Skin:     General: Skin is warm and dry.   Neurological:      Mental Status: Alert and oriented to person, place, and time.         ECG 12 Lead    Date/Time: 10/27/2023 10:25 AM  Performed by: Micha Greer MD    Authorized by: Micha Greer MD  Comparison: compared with previous ECG from 1/18/2023  Comparison to previous ECG: RBBB is no longer present  Rhythm: sinus rhythm  Rate: normal    Clinical impression: normal ECG            ICD-10-CM ICD-9-CM   1. Paroxysmal SVT (supraventricular tachycardia)  I47.10 427.0   2. Shortness of breath  R06.02 786.05   3. Physical deconditioning  R53.81 799.3         Assessment/Plan:  1.  Paroxysmal SVT: Noted on Holter monitor from 10/4/2022.  Symptoms are recently improved with diltiazem and this should be continued.     2.  Shortness of breath: No evidence of any cardiac etiology based on prior testing.  Physical deconditioning is likely contributing.     3.  Physical deconditioning: Counseled on the importance of regular " exercise.

## 2023-11-22 NOTE — PROGRESS NOTES
[facility] - PODIATRY    Today's Date: 11/28/2023     Patient Name: Lizbeth Rivera  MRN: [id number]  CSN: [id number]  PCP: Melvi Sanchez DO  Referring Provider: No ref. provider found    SUBJECTIVE   No chief complaint on file.    HPI: Lizbeth Rivera, a 57 y.o.female, comes to clinic as a(n) {new established:78639} patient {Clinic Presentation:54442}. {Med Hx:03526}. {Pod Subjective #1 (Optional):99285} {Pod Subjective #2 (Optional):01056}. {Pod Subjective #3 (Optional):02280}. Denies any constitutional symptoms. No other pedal complaints at this time.    Past Medical History:   Diagnosis Date    Abnormal ECG     Asthma     some form of     Colin's esophagus     Celiac disease     Cerebrovascular accident (CVA) 10/28/2022    Joint pain     Neuropathy     Polyneuropathy     Seasonal allergies     Sinus headache      Past Surgical History:   Procedure Laterality Date    CERVICAL LYMPH NODE BIOPSY/EXCISION Right 5/10/2021    Procedure: Excisional biopsy right level II cervical lymph node;  Surgeon: Christopher Hurd MD;  Location: [facility];  Service: ENT;  Laterality: Right;    CHOLECYSTECTOMY      TONSILLECTOMY       Family History   Problem Relation Age of Onset    Diabetes Maternal Grandmother     Diabetes Maternal Grandfather      Social History     Socioeconomic History    Marital status:    Tobacco Use    Smoking status: Never     Passive exposure: Never    Smokeless tobacco: Never   Vaping Use    Vaping Use: Never used   Substance and Sexual Activity    Alcohol use: No     Comment: very rare    Drug use: No    Sexual activity: Defer     Allergies   Allergen Reactions    Amoxicillin-Pot Clavulanate Shortness Of Breath    Clarithromycin Swelling     Facial swelling with rash    Doxepin Itching    Sulfamethoxazole-Trimethoprim Shortness Of Breath    Gluten Meal GI Intolerance    Glutethimides GI Intolerance    Gemifloxacin Palpitations    Latex Rash    Proton Pump  Inhibitors Palpitations     Current Outpatient Medications   Medication Sig Dispense Refill    aspirin 81 MG chewable tablet aspirin 81 mg chewable tablet   Chew 1 tablet every day by oral route at bedtime.      atorvastatin (LIPITOR) 20 MG tablet Take 1 tablet by mouth Daily.      COLLAGEN-BORON-HYALURONIC ACID PO Take 1 tablet by mouth Daily.      dilTIAZem CD (CARDIZEM CD) 120 MG 24 hr capsule TAKE 1 CAPSULE BY MOUTH EVERY DAY 90 capsule 3    famotidine (PEPCID) 20 MG tablet Take 1 tablet by mouth 2 (Two) Times a Day.      fexofenadine-pseudoephedrine (ALLEGRA-D 24) 180-240 MG per 24 hr tablet Take 1 tablet by mouth Daily.      gabapentin (NEURONTIN) 300 MG capsule Take 1 capsule by mouth Daily.      gabapentin (NEURONTIN) 600 MG tablet 1 tablet 2 (Two) Times a Day.      hydrOXYzine (ATARAX) 50 MG tablet Take 1 tablet by mouth Every Night.      ketoconazole (NIZORAL) 2 % shampoo USE 2 TO 3 TIMES A WEEK ON THE SCALP. LEAVE IN FOR 10 MINUTES AND THEN RINSE OUT.      MULTIPLE VITAMIN PO Take 1 tablet by mouth Daily.      selenium sulfide (SELSUN) 2.5 % lotion       sucralfate (CARAFATE) 1 g tablet TAKE 1 TABLET BY MOUTH ON AN EMPTY STOMACH THREE TIMES A DAY BEFORE MEALS ORALLY 30 DAY(S)  5    Triamcinolone Acetonide (NASACORT) 55 MCG/ACT nasal inhaler into the nostril(s) as directed by provider.      vitamin B-12 (CYANOCOBALAMIN) 100 MCG tablet Take 0.5 tablets by mouth.      VITAMIN D, CHOLECALCIFEROL, PO Take 1 tablet by mouth Daily.       No current facility-administered medications for this visit.     Review of Systems    OBJECTIVE   There were no vitals filed for this visit.    PHYSICAL EXAM  GEN:   Accompanied by {ACCOMPANYING PERSON:23843}.     Foot/Ankle Exam    RADIOLOGY/NUCLEAR:  No results found.    LABORATORY/CULTURE RESULTS:      PATHOLOGY RESULTS:       ASSESSMENT/PLAN     There are no diagnoses linked to this encounter.  Comprehensive lower extremity examination and evaluation was  performed.  Discussed findings and treatment plan including risks, benefits, and treatment options with patient in detail. Patient agreed with treatment plan.  {Pod Plan #1:13681}   An After Visit Summary was printed and given to the patient at discharge, including (if requested) any available informative/educational handouts regarding diagnosis, treatment, or medications. All questions were answered to patient/family satisfaction. Should symptoms fail to improve or worsen they agree to call or return to clinic or to go to the Emergency Department. Discussed the importance of following up with any needed screening tests/labs/specialist appointments and any requested follow-up recommended by me today. Importance of maintaining follow-up discussed and patient accepts that missed appointments can delay diagnosis and potentially lead to worsening of conditions.  No follow-ups on file., or sooner if acute issues arise.    Lab Frequency Next Occurrence   Follow Anesthesia Guidelines / Protocol Once 05/04/2021   Obtain Informed Consent Once 05/09/2021       This document has been electronically signed by Fannie Sandoval on November 22, 2023 08:53 CST

## 2023-11-27 ENCOUNTER — TELEPHONE (OUTPATIENT)
Dept: PODIATRY | Facility: CLINIC | Age: 58
End: 2023-11-27
Payer: COMMERCIAL

## 2023-11-28 ENCOUNTER — OFFICE VISIT (OUTPATIENT)
Dept: PODIATRY | Facility: CLINIC | Age: 58
End: 2023-11-28
Payer: COMMERCIAL

## 2023-11-28 VITALS
HEART RATE: 90 BPM | BODY MASS INDEX: 29.35 KG/M2 | SYSTOLIC BLOOD PRESSURE: 136 MMHG | OXYGEN SATURATION: 97 % | DIASTOLIC BLOOD PRESSURE: 82 MMHG | HEIGHT: 67 IN | WEIGHT: 187 LBS

## 2023-11-28 DIAGNOSIS — M79.672 FOOT PAIN, LEFT: ICD-10-CM

## 2023-11-28 DIAGNOSIS — M62.462 GASTROCNEMIUS EQUINUS, LEFT: ICD-10-CM

## 2023-11-28 DIAGNOSIS — M72.2 PLANTAR FASCIITIS, LEFT: Primary | ICD-10-CM

## 2023-11-28 DIAGNOSIS — M62.461 GASTROCNEMIUS EQUINUS, RIGHT: ICD-10-CM

## 2023-11-28 DIAGNOSIS — G60.9 IDIOPATHIC PERIPHERAL NEUROPATHY: ICD-10-CM

## 2023-11-28 DIAGNOSIS — M72.2 PLANTAR FASCIITIS, RIGHT: ICD-10-CM

## 2023-11-28 PROCEDURE — 99212 OFFICE O/P EST SF 10 MIN: CPT | Performed by: PODIATRIST

## 2023-11-28 NOTE — PROGRESS NOTES
[facility] - PODIATRY    Today's Date: 11/28/2023     Patient Name: Lizbeth Rivera  MRN: [id number]  CSN: [id number]  PCP: Melvi Sanchez DO  Referring Provider: No ref. provider found    SUBJECTIVE     Chief Complaint   Patient presents with    Follow-up     Melvi Sanchez 05/23/2023 2 MO FU FOR RECHECK- pt states feet doing better as long as does exercises every day and doesn't ware cheap shoes. Right great nail issues, has worked on it and better at moment but feels it coming back- pt denies pain      HPI: Lizbeth Rivera, a 57 y.o.female, comes to clinic as a(n) established patient for follow-up treatment of plantar fasciitis . Patient has h/o asthma, Colin's esophagus, celiac disease, CVA, and polyneuropathy . Patient is non-diabetic.  Patient relates numbness to both of her feet of unknown origin.  Relates that she has pain to both feet, mostly to the arch and heels.  Denies injury or trauma.  Relates that she has being doing PT and home stretching. Notes that if she does her stretches, her pain improves. Did have her xrays taken.  Denies pain today. Relates previous treatment(s) including PT . Denies any constitutional symptoms. No other pedal complaints at this time.    Past Medical History:   Diagnosis Date    Abnormal ECG     Asthma     some form of     Colin's esophagus     Celiac disease     Cerebrovascular accident (CVA) 10/28/2022    Joint pain     Neuropathy     Polyneuropathy     Seasonal allergies     Sinus headache      Past Surgical History:   Procedure Laterality Date    CERVICAL LYMPH NODE BIOPSY/EXCISION Right 5/10/2021    Procedure: Excisional biopsy right level II cervical lymph node;  Surgeon: Christopher Hurd MD;  Location: [facility];  Service: ENT;  Laterality: Right;    CHOLECYSTECTOMY      TONSILLECTOMY       Family History   Problem Relation Age of Onset    Diabetes Maternal Grandmother     Diabetes Maternal Grandfather      Social History      Socioeconomic History    Marital status:    Tobacco Use    Smoking status: Never     Passive exposure: Never    Smokeless tobacco: Never   Vaping Use    Vaping Use: Never used   Substance and Sexual Activity    Alcohol use: No     Comment: very rare    Drug use: No    Sexual activity: Defer     Allergies   Allergen Reactions    Amoxicillin-Pot Clavulanate Shortness Of Breath    Clarithromycin Swelling     Facial swelling with rash    Doxepin Itching    Sulfamethoxazole-Trimethoprim Shortness Of Breath    Gluten Meal GI Intolerance    Glutethimides GI Intolerance    Gemifloxacin Palpitations    Latex Rash    Proton Pump Inhibitors Palpitations     Current Outpatient Medications   Medication Sig Dispense Refill    aspirin 81 MG chewable tablet aspirin 81 mg chewable tablet   Chew 1 tablet every day by oral route at bedtime.      atorvastatin (LIPITOR) 20 MG tablet Take 1 tablet by mouth Daily.      COLLAGEN-BORON-HYALURONIC ACID PO Take 1 tablet by mouth Daily.      dilTIAZem CD (CARDIZEM CD) 120 MG 24 hr capsule TAKE 1 CAPSULE BY MOUTH EVERY DAY 90 capsule 3    famotidine (PEPCID) 20 MG tablet Take 1 tablet by mouth 2 (Two) Times a Day.      fexofenadine-pseudoephedrine (ALLEGRA-D 24) 180-240 MG per 24 hr tablet Take 1 tablet by mouth Daily.      gabapentin (NEURONTIN) 300 MG capsule Take 1 capsule by mouth Daily.      gabapentin (NEURONTIN) 600 MG tablet 1 tablet 2 (Two) Times a Day.      hydrOXYzine (ATARAX) 50 MG tablet Take 1 tablet by mouth Every Night.      ketoconazole (NIZORAL) 2 % shampoo USE 2 TO 3 TIMES A WEEK ON THE SCALP. LEAVE IN FOR 10 MINUTES AND THEN RINSE OUT.      MULTIPLE VITAMIN PO Take 1 tablet by mouth Daily.      selenium sulfide (SELSUN) 2.5 % lotion       sucralfate (CARAFATE) 1 g tablet TAKE 1 TABLET BY MOUTH ON AN EMPTY STOMACH THREE TIMES A DAY BEFORE MEALS ORALLY 30 DAY(S)  5    Triamcinolone Acetonide (NASACORT) 55 MCG/ACT nasal inhaler into the nostril(s) as directed by  provider.      vitamin B-12 (CYANOCOBALAMIN) 100 MCG tablet Take 0.5 tablets by mouth.      VITAMIN D, CHOLECALCIFEROL, PO Take 1 tablet by mouth Daily.       No current facility-administered medications for this visit.     Review of Systems   Constitutional:  Negative for chills and fever.   HENT:  Negative for congestion.    Respiratory:  Negative for shortness of breath.    Cardiovascular:  Negative for chest pain and leg swelling.   Gastrointestinal:  Negative for constipation, diarrhea, nausea and vomiting.   Musculoskeletal:         Foot pain   Skin:  Negative for wound.   Neurological:  Positive for numbness.       OBJECTIVE     Vitals:    11/28/23 1335   BP: 136/82   Pulse: 90   SpO2: 97%       PHYSICAL EXAM  GEN:   Accompanied by none.     Foot/Ankle Exam    GENERAL  Appearance:  appears stated age  Orientation:  AAOx3  Affect:  appropriate  Gait:  unimpaired  Assistance:  independent  Right shoe gear: casual shoe  Left shoe gear: casual shoe    VASCULAR     Right Foot Vascularity   Dorsalis pedis:  2+  Posterior tibial:  2+  Skin temperature:  warm  Edema grading:  Trace  CFT:  3  Pedal hair growth:  Present  Varicosities:  none     Left Foot Vascularity   Dorsalis pedis:  2+  Posterior tibial:  2+  Skin temperature:  warm  Edema grading:  Trace  CFT:  3  Pedal hair growth:  Present  Varicosities:  none     NEUROLOGIC     Right Foot Neurologic   Light touch sensation: diminished  Vibratory sensation: diminished  Hot/Cold sensation: diminished  Protective Sensation using Lisco-Jose C Monofilament:   Sites intact: 8  Sites tested: 10     Left Foot Neurologic   Light touch sensation: diminished  Vibratory sensation: diminished  Hot/Cold sensation:  diminished  Protective Sensation using Lisco-Jose C Monofilament:   Sites intact: 8  Sites tested: 10    MUSCULOSKELETAL     Right Foot Musculoskeletal   Ecchymosis:  none  Tenderness:  plantar arch tenderness and plantar fascia tenderness    Arch:   Normal     Left Foot Musculoskeletal   Ecchymosis:  none  Tenderness:  plantar arch tenderness and plantar fascia tenderness  Arch:  Normal    MUSCLE STRENGTH     Right Foot Muscle Strength   Foot dorsiflexion:  5  Foot plantar flexion:  5  Foot inversion:  5  Foot eversion:  5     Left Foot Muscle Strength   Foot dorsiflexion:  5  Foot plantar flexion:  5  Foot inversion:  5  Foot eversion:  5    RANGE OF MOTION     Right Foot Range of Motion   Foot and ankle ROM within normal limits    Ankle dorsiflexion: 5 decreased      Left Foot Range of Motion   Foot and ankle ROM within normal limits    Ankle dorsiflexion: 5 decreased    DERMATOLOGIC      Right Foot Dermatologic   Skin  Right foot skin is intact.      Left Foot Dermatologic   Skin  Left foot skin is intact.       RADIOLOGY/NUCLEAR:  No results found.    LABORATORY/CULTURE RESULTS:      PATHOLOGY RESULTS:       ASSESSMENT/PLAN     Diagnoses and all orders for this visit:    1. Plantar fasciitis, left (Primary)    2. Plantar fasciitis, right    3. Foot pain, left    4. Idiopathic peripheral neuropathy    5. Gastrocnemius equinus, left    6. Gastrocnemius equinus, right        Comprehensive lower extremity examination and evaluation was performed.  Discussed findings and treatment plan including risks, benefits, and treatment options with patient in detail. Patient agreed with treatment plan.  Conservative therapy including daily stretching (demonstrated proper way of performing), icing 3x daily, decreased activity, and nsaids PRN.   Continue use of cushioned arch supports.  Reviewed xrays with patient.   Due to improvement, surgery is not needed at this time.  An After Visit Summary was printed and given to the patient at discharge, including (if requested) any available informative/educational handouts regarding diagnosis, treatment, or medications. All questions were answered to patient/family satisfaction. Should symptoms fail to improve or worsen they agree  to call or return to clinic or to go to the Emergency Department. Discussed the importance of following up with any needed screening tests/labs/specialist appointments and any requested follow-up recommended by me today. Importance of maintaining follow-up discussed and patient accepts that missed appointments can delay diagnosis and potentially lead to worsening of conditions.  Return if symptoms worsen or fail to improve., or sooner if acute issues arise.    Lab Frequency Next Occurrence   Follow Anesthesia Guidelines / Protocol Once 05/04/2021   Obtain Informed Consent Once 05/09/2021   CT Soft Tissue Neck With & Without Contrast Once 10/18/2023   Allergens (10) Fish Once 11/14/2023   Allergens (19) Once 11/14/2023   Allergens (21) Pollens Once 11/14/2023   Allergens (22) Foods Once 11/14/2023   Allergens (22) Vegetables Once 11/14/2023   Allergens (5) Meat Once 11/14/2023   Allergens (7) Insects Once 11/14/2023   Allergens (9) Dairy / Egg Once 11/14/2023       This document has been electronically signed by Jae Tijerina DPM on November 28, 2023 13:59 CST

## 2024-01-15 RX ORDER — METOPROLOL SUCCINATE 25 MG/1
25 TABLET, EXTENDED RELEASE ORAL DAILY
Qty: 90 TABLET | Refills: 0 | OUTPATIENT
Start: 2024-01-15

## 2024-04-24 ENCOUNTER — OFFICE VISIT (OUTPATIENT)
Dept: CARDIOLOGY | Facility: CLINIC | Age: 59
End: 2024-04-24
Payer: COMMERCIAL

## 2024-04-24 VITALS
BODY MASS INDEX: 29.76 KG/M2 | HEIGHT: 67 IN | DIASTOLIC BLOOD PRESSURE: 86 MMHG | HEART RATE: 89 BPM | SYSTOLIC BLOOD PRESSURE: 132 MMHG | OXYGEN SATURATION: 99 % | WEIGHT: 189.6 LBS

## 2024-04-24 DIAGNOSIS — R53.81 PHYSICAL DECONDITIONING: ICD-10-CM

## 2024-04-24 DIAGNOSIS — E78.5 DYSLIPIDEMIA: ICD-10-CM

## 2024-04-24 DIAGNOSIS — I47.10 PAROXYSMAL SUPRAVENTRICULAR TACHYCARDIA: Primary | ICD-10-CM

## 2024-04-24 PROBLEM — Z00.00 ANNUAL PHYSICAL EXAM: Status: RESOLVED | Noted: 2023-09-29 | Resolved: 2024-04-24

## 2024-04-24 PROBLEM — K21.9 GERD (GASTROESOPHAGEAL REFLUX DISEASE): Status: RESOLVED | Noted: 2020-06-05 | Resolved: 2024-04-24

## 2024-04-24 PROBLEM — K21.00 ESOPHAGITIS, REFLUX: Status: RESOLVED | Noted: 2023-09-29 | Resolved: 2024-04-24

## 2024-04-24 PROCEDURE — 93000 ELECTROCARDIOGRAM COMPLETE: CPT | Performed by: INTERNAL MEDICINE

## 2024-04-24 PROCEDURE — 99214 OFFICE O/P EST MOD 30 MIN: CPT | Performed by: INTERNAL MEDICINE

## 2024-04-24 NOTE — PROGRESS NOTES
Reason for Visit: cardiovascular follow up.    HPI:  Lizbeth Rivera is a 58 y.o. female is here today for follow-up.  She follows in cardiology clinic secondary to paroxysmal SVT that was noted on prior Holter monitor from 2022.  At last visit in October she reported at symptoms had improved on diltiazem.  She has only had 2 palpitations episodes that were brief since starting diltiazem.  Her mother was just hospitalized with an unknown arrhythmia.  Her allergies have been bothering her recently.  Sometimes she can feel her heart beat in her her head.      Previous Cardiac Testing and Procedures:  -Holter monitor (3/30/2015) rare atrial ectopic beats with a 6 beat run of nonsustained SVT, no significant ventricular arrhythmias, palpitations correlate with SVT  -Echo (4/1/2015) EF 50-55%, normal diastolic function, normal LA and RA size, normal RV size and function, mild TR  -EKG treadmill stress (5/11/2015) average functional capacity, negative for ischemia  -Stress echo (6/16/2020) average functional capacity, electrically negative, chest pain and shortness of breath reported with exercise, normal wall motion at rest and stress, low risk  -Holter monitor (10/4/2022) rare PACs and PVCs, 5 runs of nonsustained SVT up to 17 seconds, symptoms correlated with sinus rhythm and SVT  -Echo (10/12/2022) EF 50-55%, normal RV size and function, normal LA and RA, mild MR, no evidence of shunt     Lab data:  -proBNP (6/5/2020) 29, normal 5-900  -Lipid panel (9/27/2023) total cholesterol 117, HDL 48, LDL 55, triglycerides 68  -BMP (9/27/2023) creatinine 0.8, GFR > 60, potassium 4.2, sodium 143    Patient Active Problem List   Diagnosis    Peripheral neuropathy    Celiac disease    Hx of parotitis    Lymphadenopathy of head and neck    Gastroesophageal reflux disease without esophagitis    Cerebrovascular accident (CVA)    Abnormal ECG    Asthma    Colin's esophagus    Joint pain    Seasonal allergies    Sinus headache     Chronic rhinitis    Fibromyalgia    Pain in both lower legs    Paresthesia    Paroxysmal supraventricular tachycardia    Sicca    Toxic polyneuropathy    Vitamin B12 deficiency    Weakness    Dyslipidemia       Social History     Tobacco Use    Smoking status: Never     Passive exposure: Never    Smokeless tobacco: Never   Vaping Use    Vaping status: Never Used   Substance Use Topics    Alcohol use: No     Comment: very rare    Drug use: No       Family History   Problem Relation Age of Onset    Diabetes Maternal Grandmother     Diabetes Maternal Grandfather        The following portions of the patient's history were reviewed and updated as appropriate: allergies, current medications, past family history, past medical history, past social history, past surgical history, and problem list.      Current Outpatient Medications:     aspirin 81 MG chewable tablet, aspirin 81 mg chewable tablet  Chew 1 tablet every day by oral route at bedtime., Disp: , Rfl:     atorvastatin (LIPITOR) 20 MG tablet, Take 1 tablet by mouth Daily., Disp: , Rfl:     COLLAGEN-BORON-HYALURONIC ACID PO, Take 1 tablet by mouth Daily., Disp: , Rfl:     dilTIAZem CD (CARDIZEM CD) 120 MG 24 hr capsule, TAKE 1 CAPSULE BY MOUTH EVERY DAY, Disp: 90 capsule, Rfl: 3    famotidine (PEPCID) 20 MG tablet, Take 1 tablet by mouth 2 (Two) Times a Day., Disp: , Rfl:     fexofenadine-pseudoephedrine (ALLEGRA-D 24) 180-240 MG per 24 hr tablet, Take 1 tablet by mouth Daily., Disp: , Rfl:     gabapentin (NEURONTIN) 300 MG capsule, Take 1 capsule by mouth Daily., Disp: , Rfl:     gabapentin (NEURONTIN) 600 MG tablet, 1 tablet 2 (Two) Times a Day. 450mg am, 300mg afternoon, 300mg night, Disp: , Rfl:     hydrOXYzine (ATARAX) 50 MG tablet, Take 1 tablet by mouth Every Night., Disp: , Rfl:     MULTIPLE VITAMIN PO, Take 1 tablet by mouth Daily., Disp: , Rfl:     sucralfate (CARAFATE) 1 g tablet, TAKE 1 TABLET BY MOUTH ON AN EMPTY STOMACH THREE TIMES A DAY BEFORE MEALS  "ORALLY 30 DAY(S), Disp: , Rfl: 5    vitamin B-12 (CYANOCOBALAMIN) 100 MCG tablet, Take 0.5 tablets by mouth., Disp: , Rfl:     VITAMIN D, CHOLECALCIFEROL, PO, Take 1 tablet by mouth Daily., Disp: , Rfl:     ketoconazole (NIZORAL) 2 % shampoo, USE 2 TO 3 TIMES A WEEK ON THE SCALP. LEAVE IN FOR 10 MINUTES AND THEN RINSE OUT. (Patient not taking: Reported on 4/24/2024), Disp: , Rfl:     selenium sulfide (SELSUN) 2.5 % lotion, , Disp: , Rfl:     Triamcinolone Acetonide (NASACORT) 55 MCG/ACT nasal inhaler, into the nostril(s) as directed by provider. (Patient not taking: Reported on 4/24/2024), Disp: , Rfl:     Review of Systems   Constitutional: Positive for malaise/fatigue. Negative for chills and fever.   Cardiovascular:  Positive for palpitations. Negative for chest pain and paroxysmal nocturnal dyspnea.   Respiratory:  Positive for shortness of breath. Negative for cough.    Skin:  Negative for rash.   Gastrointestinal:  Negative for abdominal pain and heartburn.   Neurological:  Positive for headaches. Negative for dizziness and numbness.       Objective   /86 (BP Location: Left arm, Patient Position: Sitting, Cuff Size: Adult)   Pulse 89   Ht 170.2 cm (67\")   Wt 86 kg (189 lb 9.6 oz)   SpO2 99%   BMI 29.70 kg/m²   Constitutional:       Appearance: Well-developed.   HENT:      Head: Normocephalic and atraumatic.   Pulmonary:      Effort: Pulmonary effort is normal.      Breath sounds: Normal breath sounds.   Cardiovascular:      Normal rate. Regular rhythm.      Murmurs: There is no murmur.   Edema:     Peripheral edema absent.   Skin:     General: Skin is warm and dry.   Neurological:      Mental Status: Alert and oriented to person, place, and time.         ECG 12 Lead    Date/Time: 4/24/2024 1:56 PM  Performed by: Micha Greer MD    Authorized by: Micha Greer MD  Comparison: compared with previous ECG from 10/27/2023  Comparison to previous ECG: RBBB is now present  Rhythm: sinus " rhythm  Rate: normal            ICD-10-CM ICD-9-CM   1. Paroxysmal supraventricular tachycardia  I47.10 427.0   2. Dyslipidemia  E78.5 272.4   3. Physical deconditioning  R53.81 799.3         Assessment/Plan:  1.  Paroxysmal SVT: Noted on Holter monitor from 10/4/2022.  Symptoms have improved with diltiazem.  Continue at 120 mg daily.    2.  Dyslipidemia: Lipid panel from 9/27/2023 showed good control.  Continue atorvastatin.    3.  Physical deconditioning: She has been working on getting light activity.  We discussed additional lifestyle modification and aerobic exercises to improve conditioning.

## 2024-04-24 NOTE — LETTER
April 24, 2024     Melvi Sanchez DO  [address] [place] [address]    Patient: Lizbeth Rivera   YOB: 1965   Date of Visit: 4/24/2024       Dear Melvi Sanchez DO    Lizbeth Rivera was in my office today. Below is a copy of my note.    If you have questions, please do not hesitate to call me. I look forward to following Lizbeth along with you.         Sincerely,        Micha Greer MD        CC: No Recipients      Reason for Visit: cardiovascular follow up.    HPI:  Lizbeth Rivera is a 58 y.o. female is here today for follow-up.  She follows in cardiology clinic secondary to paroxysmal SVT that was noted on prior Holter monitor from 2022.  At last visit in October she reported at symptoms had improved on diltiazem.  She has only had 2 palpitations episodes that were brief since starting diltiazem.  Her mother was just hospitalized with an unknown arrhythmia.  Her allergies have been bothering her recently.  Sometimes she can feel her heart beat in her her head.      Previous Cardiac Testing and Procedures:  -Holter monitor (3/30/2015) rare atrial ectopic beats with a 6 beat run of nonsustained SVT, no significant ventricular arrhythmias, palpitations correlate with SVT  -Echo (4/1/2015) EF 50-55%, normal diastolic function, normal LA and RA size, normal RV size and function, mild TR  -EKG treadmill stress (5/11/2015) average functional capacity, negative for ischemia  -Stress echo (6/16/2020) average functional capacity, electrically negative, chest pain and shortness of breath reported with exercise, normal wall motion at rest and stress, low risk  -Holter monitor (10/4/2022) rare PACs and PVCs, 5 runs of nonsustained SVT up to 17 seconds, symptoms correlated with sinus rhythm and SVT  -Echo (10/12/2022) EF 50-55%, normal RV size and function, normal LA and RA, mild MR, no evidence of shunt     Lab data:  -proBNP (6/5/2020) 29, normal 5-900  -Lipid panel  (9/27/2023) total cholesterol 117, HDL 48, LDL 55, triglycerides 68  -BMP (9/27/2023) creatinine 0.8, GFR > 60, potassium 4.2, sodium 143    Patient Active Problem List   Diagnosis   • Peripheral neuropathy   • Celiac disease   • Hx of parotitis   • Lymphadenopathy of head and neck   • Gastroesophageal reflux disease without esophagitis   • Cerebrovascular accident (CVA)   • Abnormal ECG   • Asthma   • Colin's esophagus   • Joint pain   • Seasonal allergies   • Sinus headache   • Chronic rhinitis   • Fibromyalgia   • Pain in both lower legs   • Paresthesia   • Paroxysmal supraventricular tachycardia   • Sicca   • Toxic polyneuropathy   • Vitamin B12 deficiency   • Weakness   • Dyslipidemia       Social History     Tobacco Use   • Smoking status: Never     Passive exposure: Never   • Smokeless tobacco: Never   Vaping Use   • Vaping status: Never Used   Substance Use Topics   • Alcohol use: No     Comment: very rare   • Drug use: No       Family History   Problem Relation Age of Onset   • Diabetes Maternal Grandmother    • Diabetes Maternal Grandfather        The following portions of the patient's history were reviewed and updated as appropriate: allergies, current medications, past family history, past medical history, past social history, past surgical history, and problem list.      Current Outpatient Medications:   •  aspirin 81 MG chewable tablet, aspirin 81 mg chewable tablet  Chew 1 tablet every day by oral route at bedtime., Disp: , Rfl:   •  atorvastatin (LIPITOR) 20 MG tablet, Take 1 tablet by mouth Daily., Disp: , Rfl:   •  COLLAGEN-BORON-HYALURONIC ACID PO, Take 1 tablet by mouth Daily., Disp: , Rfl:   •  dilTIAZem CD (CARDIZEM CD) 120 MG 24 hr capsule, TAKE 1 CAPSULE BY MOUTH EVERY DAY, Disp: 90 capsule, Rfl: 3  •  famotidine (PEPCID) 20 MG tablet, Take 1 tablet by mouth 2 (Two) Times a Day., Disp: , Rfl:   •  fexofenadine-pseudoephedrine (ALLEGRA-D 24) 180-240 MG per 24 hr tablet, Take 1 tablet by  "mouth Daily., Disp: , Rfl:   •  gabapentin (NEURONTIN) 300 MG capsule, Take 1 capsule by mouth Daily., Disp: , Rfl:   •  gabapentin (NEURONTIN) 600 MG tablet, 1 tablet 2 (Two) Times a Day. 450mg am, 300mg afternoon, 300mg night, Disp: , Rfl:   •  hydrOXYzine (ATARAX) 50 MG tablet, Take 1 tablet by mouth Every Night., Disp: , Rfl:   •  MULTIPLE VITAMIN PO, Take 1 tablet by mouth Daily., Disp: , Rfl:   •  sucralfate (CARAFATE) 1 g tablet, TAKE 1 TABLET BY MOUTH ON AN EMPTY STOMACH THREE TIMES A DAY BEFORE MEALS ORALLY 30 DAY(S), Disp: , Rfl: 5  •  vitamin B-12 (CYANOCOBALAMIN) 100 MCG tablet, Take 0.5 tablets by mouth., Disp: , Rfl:   •  VITAMIN D, CHOLECALCIFEROL, PO, Take 1 tablet by mouth Daily., Disp: , Rfl:   •  ketoconazole (NIZORAL) 2 % shampoo, USE 2 TO 3 TIMES A WEEK ON THE SCALP. LEAVE IN FOR 10 MINUTES AND THEN RINSE OUT. (Patient not taking: Reported on 4/24/2024), Disp: , Rfl:   •  selenium sulfide (SELSUN) 2.5 % lotion, , Disp: , Rfl:   •  Triamcinolone Acetonide (NASACORT) 55 MCG/ACT nasal inhaler, into the nostril(s) as directed by provider. (Patient not taking: Reported on 4/24/2024), Disp: , Rfl:     Review of Systems   Constitutional: Positive for malaise/fatigue. Negative for chills and fever.   Cardiovascular:  Positive for palpitations. Negative for chest pain and paroxysmal nocturnal dyspnea.   Respiratory:  Positive for shortness of breath. Negative for cough.    Skin:  Negative for rash.   Gastrointestinal:  Negative for abdominal pain and heartburn.   Neurological:  Positive for headaches. Negative for dizziness and numbness.       Objective  /86 (BP Location: Left arm, Patient Position: Sitting, Cuff Size: Adult)   Pulse 89   Ht 170.2 cm (67\")   Wt 86 kg (189 lb 9.6 oz)   SpO2 99%   BMI 29.70 kg/m²   Constitutional:       Appearance: Well-developed.   HENT:      Head: Normocephalic and atraumatic.   Pulmonary:      Effort: Pulmonary effort is normal.      Breath sounds: Normal " breath sounds.   Cardiovascular:      Normal rate. Regular rhythm.      Murmurs: There is no murmur.   Edema:     Peripheral edema absent.   Skin:     General: Skin is warm and dry.   Neurological:      Mental Status: Alert and oriented to person, place, and time.         ECG 12 Lead    Date/Time: 4/24/2024 1:56 PM  Performed by: Micha Greer MD    Authorized by: Micha Greer MD  Comparison: compared with previous ECG from 10/27/2023  Comparison to previous ECG: RBBB is now present  Rhythm: sinus rhythm  Rate: normal            ICD-10-CM ICD-9-CM   1. Paroxysmal supraventricular tachycardia  I47.10 427.0   2. Dyslipidemia  E78.5 272.4   3. Physical deconditioning  R53.81 799.3         Assessment/Plan:  1.  Paroxysmal SVT: Noted on Holter monitor from 10/4/2022.  Symptoms have improved with diltiazem.  Continue at 120 mg daily.    2.  Dyslipidemia: Lipid panel from 9/27/2023 showed good control.  Continue atorvastatin.    3.  Physical deconditioning: She has been working on getting light activity.  We discussed additional lifestyle modification and aerobic exercises to improve conditioning.

## 2024-07-30 NOTE — PROGRESS NOTES
Subjective    Ms. Rivera is 58 y.o. female    Chief Complaint: Urine frequency and urgency    History of Present Illness    58-year-old female new patient referred for urinary frequency, urgency and incontinence.  Onset over the last year.  Reports both leakage from coughing, laughing, sneezing or position change as well as occasional inability to make it to the bathroom in time now requiring 1 pad daily.  Patient reporting excessive urine frequency day and night to the point patient has counted on average is going 20-30 times daily and anywhere from 5-8 times nightly.  Patient reports a history of peripheral neuropathy followed by neurologist.  Has wondered if the neuropathy is playing into her new bladder issues.  No prior overactive bladder medications tried.    The following portions of the patient's history were reviewed and updated as appropriate: allergies, current medications, past family history, past medical history, past social history, past surgical history and problem list.    Review of Systems   Constitutional:  Negative for chills, fatigue and fever.   Gastrointestinal:  Negative for nausea and vomiting.   Genitourinary:  Positive for frequency and urgency.         Current Outpatient Medications:     aspirin 81 MG chewable tablet, aspirin 81 mg chewable tablet  Chew 1 tablet every day by oral route at bedtime., Disp: , Rfl:     atorvastatin (LIPITOR) 20 MG tablet, Take 1 tablet by mouth Daily., Disp: , Rfl:     cholecalciferol 25 MCG (1000 UT) tablet, Take 1 tablet by mouth Daily., Disp: , Rfl:     COLLAGEN PO, Take 1 tablet by mouth Daily., Disp: , Rfl:     COLLAGEN-BORON-HYALURONIC ACID PO, Take 1 tablet by mouth Daily., Disp: , Rfl:     dilTIAZem CD (CARDIZEM CD) 120 MG 24 hr capsule, TAKE 1 CAPSULE BY MOUTH EVERY DAY, Disp: 90 capsule, Rfl: 3    famotidine (PEPCID) 20 MG tablet, Take 1 tablet by mouth 2 (Two) Times a Day., Disp: , Rfl:     hydrOXYzine (ATARAX) 50 MG tablet, Take 1 tablet by  "mouth Every Night., Disp: , Rfl:     ketoconazole (NIZORAL) 2 % shampoo, , Disp: , Rfl:     levocetirizine (XYZAL) 5 MG tablet, Take 1 tablet by mouth Daily., Disp: , Rfl:     MULTIPLE VITAMIN PO, Take 1 tablet by mouth Daily., Disp: , Rfl:     Pregabalin ER 82.5 MG tablet sustained-release 24 hour, Take 1 tablet by mouth Every Morning., Disp: , Rfl:     pseudoephedrine (SUDAFED) 120 MG 12 hr tablet, Take 1 tablet by mouth Every 12 (Twelve) Hours., Disp: , Rfl:     sucralfate (CARAFATE) 1 g tablet, TAKE 1 TABLET BY MOUTH ON AN EMPTY STOMACH THREE TIMES A DAY BEFORE MEALS ORALLY 30 DAY(S), Disp: , Rfl: 5    vitamin B-12 (CYANOCOBALAMIN) 100 MCG tablet, Take 0.5 tablets by mouth., Disp: , Rfl:     oxybutynin XL (DITROPAN-XL) 10 MG 24 hr tablet, Take 1 tablet by mouth Daily., Disp: 30 tablet, Rfl: 11    Past Medical History:   Diagnosis Date    Abnormal ECG     Asthma     some form of     Colin's esophagus     Celiac disease     Cerebrovascular accident (CVA) 10/28/2022    Dyslipidemia 4/24/2024    Joint pain     Neuropathy     Polyneuropathy     Seasonal allergies     Sinus headache        Past Surgical History:   Procedure Laterality Date    CERVICAL LYMPH NODE BIOPSY/EXCISION Right 5/10/2021    Procedure: Excisional biopsy right level II cervical lymph node;  Surgeon: Christopher Hurd MD;  Location: [facility];  Service: ENT;  Laterality: Right;    CHOLECYSTECTOMY      TONSILLECTOMY         Social History     Socioeconomic History    Marital status:    Tobacco Use    Smoking status: Never     Passive exposure: Never    Smokeless tobacco: Never   Vaping Use    Vaping status: Never Used   Substance and Sexual Activity    Alcohol use: No     Comment: very rare    Drug use: No    Sexual activity: Defer       Family History   Problem Relation Age of Onset    Diabetes Maternal Grandmother     Diabetes Maternal Grandfather        Objective    Temp 97.4 °F (36.3 °C)   Ht 170.2 cm (67\")   Wt 82.7 kg (182 lb 6.4 " oz)   BMI 28.57 kg/m²     Physical Exam  Nursing note reviewed.   Constitutional:       General: She is not in acute distress.     Appearance: Normal appearance. She is not ill-appearing.   HENT:      Nose: No congestion.   Abdominal:      Tenderness: There is no right CVA tenderness or left CVA tenderness.      Hernia: No hernia is present.   Skin:     General: Skin is warm and dry.   Neurological:      Mental Status: She is alert and oriented to person, place, and time.   Psychiatric:         Mood and Affect: Mood normal.         Behavior: Behavior normal.             Results for orders placed or performed in visit on 08/08/24   POC Urinalysis Dipstick, Multipro    Specimen: Urine   Result Value Ref Range    Color Yellow Yellow, Straw, Dark Yellow, Alison    Clarity, UA Clear Clear    Glucose, UA Negative Negative mg/dL    Bilirubin Negative Negative    Ketones, UA Negative Negative    Specific Gravity  1.025 1.005 - 1.030    Blood, UA Negative Negative    pH, Urine 6.0 5.0 - 8.0    Protein, POC Negative Negative mg/dL    Urobilinogen, UA 0.2 E.U./dL Normal, 0.2 E.U./dL    Nitrite, UA Negative Negative    Leukocytes Negative Negative     Assessment and Plan  Estimation of residual urine via abdominal ultrasound  Residual Urine: 114 ml  Indication: Urge incontinence  Position: Supine  Examination: Incremental scanning of the suprapubic area using 3 MHz transducer using copious amounts of acoustic gel.   Findings: An anechoic area was demonstrated which represented the bladder, with measurement of residual urine as noted. I inspected this myself. In that the residual urine was stable or insignificant, no treatment will be necessary at this time.        Diagnoses and all orders for this visit:    1. Urinary urgency (Primary)  -     POC Urinalysis Dipstick, Multipro  -     oxybutynin XL (DITROPAN-XL) 10 MG 24 hr tablet; Take 1 tablet by mouth Daily.  Dispense: 30 tablet; Refill: 11      Patient battling from  significant overactive bladder.  Likely a neurogenic component as well given patient's history of peripheral neuropathy.  Will try patient on overactive bladder medication oxybutynin XL 10 mg daily.    PVR today 114 mL patient does not appear to be retaining significant large amount of urine to explain the significant frequency and urgency    Follow-up 6 weeks

## 2024-08-07 ENCOUNTER — TELEPHONE (OUTPATIENT)
Dept: UROLOGY | Facility: CLINIC | Age: 59
End: 2024-08-07
Payer: COMMERCIAL

## 2024-08-07 NOTE — TELEPHONE ENCOUNTER
Provider: VARGHESE HARRIS    Caller: DENISE RILEY    Relationship to Patient: SELF      Reason for Call: PT IS SCHEDULED FOR NEW PT APPT ON 8/8/24.  SHE CURRENTLY IS HAVING COLD SYMPTOMNS - NO FEVER, DRAINAGE, AND COUGH.  PT TESTED NEGATIVE FOR COVID. SYMPTOMS STARTED 8/3 - AND SHE IS ALREADY FEELING BETTER.    PLEASE CALL PT BACK IF SHE NEEDS TO RESCHEDULE.  NO CALLBACK REQUIRED IF SHE IS OK TO COME TO APPT.    UNABLE TO WARM KAHN TO CONFIRM.

## 2024-08-07 NOTE — TELEPHONE ENCOUNTER
Called pt. Back, I let her know that if she is not running fever and tested negative for Covid that we would be able to see her. Pt. V/u.

## 2024-08-08 ENCOUNTER — OFFICE VISIT (OUTPATIENT)
Dept: UROLOGY | Facility: CLINIC | Age: 59
End: 2024-08-08
Payer: COMMERCIAL

## 2024-08-08 ENCOUNTER — PATIENT ROUNDING (BHMG ONLY) (OUTPATIENT)
Dept: UROLOGY | Facility: CLINIC | Age: 59
End: 2024-08-08
Payer: COMMERCIAL

## 2024-08-08 VITALS — WEIGHT: 182.4 LBS | HEIGHT: 67 IN | TEMPERATURE: 97.4 F | BODY MASS INDEX: 28.63 KG/M2

## 2024-08-08 DIAGNOSIS — R39.15 URINARY URGENCY: Primary | ICD-10-CM

## 2024-08-08 LAB
BILIRUB BLD-MCNC: NEGATIVE MG/DL
CLARITY, POC: CLEAR
COLOR UR: YELLOW
GLUCOSE UR STRIP-MCNC: NEGATIVE MG/DL
KETONES UR QL: NEGATIVE
LEUKOCYTE EST, POC: NEGATIVE
NITRITE UR-MCNC: NEGATIVE MG/ML
PH UR: 6 [PH] (ref 5–8)
PROT UR STRIP-MCNC: NEGATIVE MG/DL
RBC # UR STRIP: NEGATIVE /UL
SP GR UR: 1.02 (ref 1–1.03)
UROBILINOGEN UR QL: NORMAL

## 2024-08-08 RX ORDER — CHOLECALCIFEROL (VITAMIN D3) 25 MCG
1000 TABLET ORAL DAILY
COMMUNITY

## 2024-08-08 RX ORDER — OXYBUTYNIN CHLORIDE 10 MG/1
10 TABLET, EXTENDED RELEASE ORAL DAILY
Qty: 30 TABLET | Refills: 11 | Status: SHIPPED | OUTPATIENT
Start: 2024-08-08

## 2024-08-08 RX ORDER — PREGABALIN 82.5 MG/1
1 TABLET, FILM COATED, EXTENDED RELEASE ORAL EVERY MORNING
COMMUNITY

## 2024-08-08 RX ORDER — LEVOCETIRIZINE DIHYDROCHLORIDE 5 MG/1
5 TABLET, FILM COATED ORAL DAILY
COMMUNITY

## 2024-08-08 RX ORDER — PSEUDOEPHEDRINE HCL 120 MG/1
120 TABLET, FILM COATED, EXTENDED RELEASE ORAL EVERY 12 HOURS
COMMUNITY
Start: 2024-04-24

## 2024-08-08 NOTE — PROGRESS NOTES
August 8, 2024    Hello, may I speak with Lizbeth Rivera?    My name is Lora      I am  with [facility]  [address], [address]  [place] [address] [phone].    Before we get started may I verify your date of birth? 1965    I am calling to officially welcome you to our practice and ask about your recent visit. Is this a good time to talk? yes    Tell me about your visit with us. What things went well?  Patient said the visit went well and she was pleased.       We're always looking for ways to make our patients' experiences even better. Do you have recommendations on ways we may improve?  no    Overall were you satisfied with your first visit to our practice? yes       I appreciate you taking the time to speak with me today. Is there anything else I can do for you? no      Thank you, and have a great day.

## 2024-08-30 ENCOUNTER — TELEPHONE (OUTPATIENT)
Dept: UROLOGY | Facility: CLINIC | Age: 59
End: 2024-08-30
Payer: COMMERCIAL

## 2024-08-30 DIAGNOSIS — R39.15 URINARY URGENCY: Primary | ICD-10-CM

## 2024-08-30 RX ORDER — SOLIFENACIN SUCCINATE 10 MG/1
10 TABLET, FILM COATED ORAL DAILY
Qty: 30 TABLET | Refills: 2 | Status: SHIPPED | OUTPATIENT
Start: 2024-08-30

## 2024-08-30 NOTE — TELEPHONE ENCOUNTER
Pt called stating that oxybutynin is causing bad dry mouth side effects. She is requesting another medication to be sent in.

## 2024-09-04 ENCOUNTER — TRANSCRIBE ORDERS (OUTPATIENT)
Dept: ADMINISTRATIVE | Age: 59
End: 2024-09-04

## 2024-09-04 DIAGNOSIS — Z12.31 ENCOUNTER FOR SCREENING MAMMOGRAM FOR MALIGNANT NEOPLASM OF BREAST: Primary | ICD-10-CM

## 2024-09-17 RX ORDER — DILTIAZEM HYDROCHLORIDE 120 MG/1
CAPSULE, COATED, EXTENDED RELEASE ORAL
Qty: 90 CAPSULE | Refills: 3 | Status: SHIPPED | OUTPATIENT
Start: 2024-09-17

## 2024-09-23 ENCOUNTER — TELEPHONE (OUTPATIENT)
Dept: UROLOGY | Facility: CLINIC | Age: 59
End: 2024-09-23
Payer: COMMERCIAL

## 2024-09-23 ENCOUNTER — OFFICE VISIT (OUTPATIENT)
Dept: UROLOGY | Facility: CLINIC | Age: 59
End: 2024-09-23
Payer: COMMERCIAL

## 2024-09-23 ENCOUNTER — HOSPITAL ENCOUNTER (OUTPATIENT)
Dept: WOMENS IMAGING | Age: 59
Discharge: HOME | End: 2024-09-23
Payer: COMMERCIAL

## 2024-09-23 VITALS — BODY MASS INDEX: 29.41 KG/M2 | HEIGHT: 67 IN | WEIGHT: 187.4 LBS | TEMPERATURE: 97 F

## 2024-09-23 VITALS — HEIGHT: 67 IN | WEIGHT: 185 LBS | BODY MASS INDEX: 29.03 KG/M2

## 2024-09-23 DIAGNOSIS — R39.15 URINARY URGENCY: Primary | ICD-10-CM

## 2024-09-23 DIAGNOSIS — Z12.31 ENCOUNTER FOR SCREENING MAMMOGRAM FOR MALIGNANT NEOPLASM OF BREAST: ICD-10-CM

## 2024-09-23 LAB
BILIRUB BLD-MCNC: NEGATIVE MG/DL
CLARITY, POC: CLEAR
COLOR UR: YELLOW
GLUCOSE UR STRIP-MCNC: NEGATIVE MG/DL
KETONES UR QL: NEGATIVE
LEUKOCYTE EST, POC: NEGATIVE
NITRITE UR-MCNC: NEGATIVE MG/ML
PH UR: 7 [PH] (ref 5–8)
PROT UR STRIP-MCNC: NEGATIVE MG/DL
RBC # UR STRIP: NEGATIVE /UL
SP GR UR: 1.01 (ref 1–1.03)
UROBILINOGEN UR QL: NORMAL

## 2024-09-23 PROCEDURE — 77063 BREAST TOMOSYNTHESIS BI: CPT

## 2024-09-23 PROCEDURE — 99214 OFFICE O/P EST MOD 30 MIN: CPT

## 2024-09-23 PROCEDURE — 81001 URINALYSIS AUTO W/SCOPE: CPT

## 2024-09-23 RX ORDER — VONOPRAZAN FUMARATE 26.72 MG/1
1 TABLET ORAL DAILY
COMMUNITY
Start: 2024-09-18

## 2024-11-26 DIAGNOSIS — R39.15 URINARY URGENCY: ICD-10-CM

## 2024-11-26 RX ORDER — SOLIFENACIN SUCCINATE 10 MG/1
10 TABLET, FILM COATED ORAL DAILY
Qty: 90 TABLET | OUTPATIENT
Start: 2024-11-26

## 2025-04-23 ENCOUNTER — OFFICE VISIT (OUTPATIENT)
Dept: CARDIOLOGY | Facility: CLINIC | Age: 60
End: 2025-04-23
Payer: COMMERCIAL

## 2025-04-23 VITALS
SYSTOLIC BLOOD PRESSURE: 134 MMHG | BODY MASS INDEX: 29.51 KG/M2 | HEART RATE: 77 BPM | OXYGEN SATURATION: 98 % | WEIGHT: 188 LBS | HEIGHT: 67 IN | DIASTOLIC BLOOD PRESSURE: 84 MMHG

## 2025-04-23 DIAGNOSIS — I10 ESSENTIAL HYPERTENSION: ICD-10-CM

## 2025-04-23 DIAGNOSIS — E78.5 DYSLIPIDEMIA: ICD-10-CM

## 2025-04-23 DIAGNOSIS — I47.10 PAROXYSMAL SUPRAVENTRICULAR TACHYCARDIA: Primary | ICD-10-CM

## 2025-04-23 PROBLEM — M33.13 DERMATOMYOSITIS: Status: ACTIVE | Noted: 2025-04-23

## 2025-04-23 PROCEDURE — 99214 OFFICE O/P EST MOD 30 MIN: CPT | Performed by: INTERNAL MEDICINE

## 2025-04-23 PROCEDURE — 93000 ELECTROCARDIOGRAM COMPLETE: CPT | Performed by: INTERNAL MEDICINE

## 2025-04-23 RX ORDER — CYCLOSPORINE 0.5 MG/ML
1 EMULSION OPHTHALMIC 2 TIMES DAILY
COMMUNITY

## 2025-04-23 RX ORDER — HYDROXYCHLOROQUINE SULFATE 200 MG/1
1 TABLET, FILM COATED ORAL EVERY 12 HOURS SCHEDULED
COMMUNITY
Start: 2025-04-03

## 2025-04-23 NOTE — LETTER
April 23, 2025     Melvi Sanchez DO  [address] [place] [address]    Patient: Lizbeth Rivera   YOB: 1965   Date of Visit: 4/23/2025       Dear Melvi Sanchez DO    Lizbeth Rivera was in my office today. Below is a copy of my note.    If you have questions, please do not hesitate to call me. I look forward to following Lizbeth along with you.         Sincerely,        Micha Greer MD        CC: No Recipients      Reason for Visit: cardiovascular follow up.    HPI:  Lizbeth Rivera is a 59 y.o. female is here today for 1 year follow-up.  She has been diagnosed with dermatomyositis.  She notes there is concern for possible Lupus, but studies have been inconclusive.  She is managed on hydroxychlorquine.  She has been trying to exercise more.  She does have some shortness of breath, but attributes this to being out of shape.  Her blood pressure has been borderline at home, but has improved since getting off steroids.  She has not had any significant palpitations and feels the diltiazem is working well.  Her allergies have been bothering her more lately.      Previous Cardiac Testing and Procedures:  -Holter monitor (3/30/2015) rare atrial ectopic beats with a 6 beat run of nonsustained SVT, no significant ventricular arrhythmias, palpitations correlate with SVT  -Echo (4/1/2015) EF 50-55%, normal diastolic function, normal LA and RA size, normal RV size and function, mild TR  -EKG treadmill stress (5/11/2015) average functional capacity, negative for ischemia  -Stress echo (6/16/2020) average functional capacity, electrically negative, chest pain and shortness of breath reported with exercise, normal wall motion at rest and stress, low risk  -Holter monitor (10/4/2022) rare PACs and PVCs, 5 runs of nonsustained SVT up to 17 seconds, symptoms correlated with sinus rhythm and SVT  -Echo (10/12/2022) EF 50-55%, normal RV size and function, normal LA and RA, mild MR, no  evidence of shunt  -Echo (12/12/2024) EF 60-65%, normal diastolic function, normal LA and RA, normal RV size and function, no significant valve dysfunction, normal RVSP     Lab data:  -proBNP (6/5/2020) 29, normal 5-900  -Lipid panel (9/27/2023) total cholesterol 117, HDL 48, LDL 55, triglycerides 68  -BMP (9/27/2023) creatinine 0.8, GFR > 60, potassium 4.2, sodium 143    Patient Active Problem List   Diagnosis   • Peripheral neuropathy   • Celiac disease   • Hx of parotitis   • Lymphadenopathy of head and neck   • Gastroesophageal reflux disease without esophagitis   • Cerebrovascular accident (CVA)   • Abnormal ECG   • Asthma   • Colin's esophagus   • Joint pain   • Seasonal allergies   • Sinus headache   • Chronic rhinitis   • Fibromyalgia   • Pain in both lower legs   • Paresthesia   • Paroxysmal supraventricular tachycardia   • Sicca   • Toxic polyneuropathy   • Vitamin B12 deficiency   • Weakness   • Dyslipidemia   • Dermatomyositis   • Essential hypertension       Social History     Tobacco Use   • Smoking status: Never     Passive exposure: Never   • Smokeless tobacco: Never   Vaping Use   • Vaping status: Never Used   Substance Use Topics   • Alcohol use: No     Comment: very rare   • Drug use: No       Family History   Problem Relation Age of Onset   • Diabetes Maternal Grandmother    • Diabetes Maternal Grandfather        The following portions of the patient's history were reviewed and updated as appropriate: allergies, current medications, past family history, past medical history, past social history, past surgical history, and problem list.      Current Outpatient Medications:   •  aspirin 81 MG chewable tablet, aspirin 81 mg chewable tablet  Chew 1 tablet every day by oral route at bedtime., Disp: , Rfl:   •  atorvastatin (LIPITOR) 20 MG tablet, Take 1 tablet by mouth Daily., Disp: , Rfl:   •  cholecalciferol 25 MCG (1000 UT) tablet, Take 1 tablet by mouth Daily., Disp: , Rfl:   •  COLLAGEN PO,  "Take 1 tablet by mouth Daily., Disp: , Rfl:   •  COLLAGEN-BORON-HYALURONIC ACID PO, Take 1 tablet by mouth Daily., Disp: , Rfl:   •  Cyanocobalamin (Vitamin B-12) 50 MCG lozenge, 100mg takes only Tues and Thurs, Disp: , Rfl:   •  cycloSPORINE (RESTASIS) 0.05 % ophthalmic emulsion, 1 drop 2 (Two) Times a Day., Disp: , Rfl:   •  dilTIAZem CD (CARDIZEM CD) 120 MG 24 hr capsule, TAKE 1 CAPSULE BY MOUTH EVERY DAY, Disp: 90 capsule, Rfl: 3  •  hydroxychloroquine (PLAQUENIL) 200 MG tablet, Take 1 tablet by mouth Every 12 (Twelve) Hours., Disp: , Rfl:   •  hydrOXYzine (ATARAX) 50 MG tablet, Take 1 tablet by mouth Every Night., Disp: , Rfl:   •  levocetirizine (XYZAL) 5 MG tablet, Take 1 tablet by mouth Daily., Disp: , Rfl:   •  MULTIPLE VITAMIN PO, Take 1 tablet by mouth Daily., Disp: , Rfl:   •  Pregabalin ER 82.5 MG tablet sustained-release 24 hour, Take 1 tablet by mouth Every Morning., Disp: , Rfl:   •  pseudoephedrine (SUDAFED) 120 MG 12 hr tablet, Take 1 tablet by mouth Every 12 (Twelve) Hours., Disp: , Rfl:   •  Voquezna 20 MG tablet, Take 1 tablet by mouth Daily., Disp: , Rfl:   •  ketoconazole (NIZORAL) 2 % shampoo, , Disp: , Rfl:     Review of Systems   Constitutional: Negative for chills and fever.   HENT:  Positive for congestion.    Cardiovascular:  Positive for dyspnea on exertion. Negative for chest pain and paroxysmal nocturnal dyspnea.   Respiratory:  Positive for shortness of breath. Negative for cough.    Skin:  Negative for rash.   Gastrointestinal:  Negative for abdominal pain and heartburn.   Neurological:  Negative for dizziness and numbness.       Objective  /84 (BP Location: Left arm, Patient Position: Sitting, Cuff Size: Adult)   Pulse 77   Ht 170.2 cm (67.01\")   Wt 85.3 kg (188 lb)   SpO2 98%   BMI 29.44 kg/m²   Constitutional:       Appearance: Well-developed.   HENT:      Head: Normocephalic and atraumatic.   Pulmonary:      Effort: Pulmonary effort is normal.      Breath sounds: " Normal breath sounds.   Cardiovascular:      Normal rate. Regular rhythm.   Edema:     Peripheral edema absent.   Skin:     General: Skin is warm and dry.   Neurological:      Mental Status: Alert and oriented to person, place, and time.         ECG 12 Lead    Date/Time: 4/23/2025 11:49 AM  Performed by: Micha Greer MD    Authorized by: Micha Greer MD  Comparison: compared with previous ECG from 11/21/2024  Comparison to previous ECG: RBBB has replaced incomplete RBBB  Rhythm: sinus rhythm  Rate: normal  Conduction: right bundle branch block            ICD-10-CM ICD-9-CM   1. Paroxysmal supraventricular tachycardia  I47.10 427.0   2. Dyslipidemia  E78.5 272.4   3. Essential hypertension  I10 401.9         Assessment/Plan:  1.  Paroxysmal SVT: Noted on Holter monitor from 10/4/2022.  No significant symptoms at this time.  Continue diltiazem.       2.  Dyslipidemia: Continue atorvastatin and obtain copy of most recent lipid panel from PCP.     3.  Essential hypertension: Blood pressure is mildly elevated today and also running high at home.  This worsened around the time she was started on steroids.  We discussed the options of additional antihypertensive medications, but she prefers additional lifestyle modification.  Continue diltiazem.

## 2025-04-23 NOTE — PROGRESS NOTES
Reason for Visit: cardiovascular follow up.    HPI:  Lizbeth Rivera is a 59 y.o. female is here today for 1 year follow-up.  She has been diagnosed with dermatomyositis.  She notes there is concern for possible Lupus, but studies have been inconclusive.  She is managed on hydroxychlorquine.  She has been trying to exercise more.  She does have some shortness of breath, but attributes this to being out of shape.  Her blood pressure has been borderline at home, but has improved since getting off steroids.  She has not had any significant palpitations and feels the diltiazem is working well.  Her allergies have been bothering her more lately.      Previous Cardiac Testing and Procedures:  -Holter monitor (3/30/2015) rare atrial ectopic beats with a 6 beat run of nonsustained SVT, no significant ventricular arrhythmias, palpitations correlate with SVT  -Echo (4/1/2015) EF 50-55%, normal diastolic function, normal LA and RA size, normal RV size and function, mild TR  -EKG treadmill stress (5/11/2015) average functional capacity, negative for ischemia  -Stress echo (6/16/2020) average functional capacity, electrically negative, chest pain and shortness of breath reported with exercise, normal wall motion at rest and stress, low risk  -Holter monitor (10/4/2022) rare PACs and PVCs, 5 runs of nonsustained SVT up to 17 seconds, symptoms correlated with sinus rhythm and SVT  -Echo (10/12/2022) EF 50-55%, normal RV size and function, normal LA and RA, mild MR, no evidence of shunt  -Echo (12/12/2024) EF 60-65%, normal diastolic function, normal LA and RA, normal RV size and function, no significant valve dysfunction, normal RVSP     Lab data:  -proBNP (6/5/2020) 29, normal 5-900  -Lipid panel (9/27/2023) total cholesterol 117, HDL 48, LDL 55, triglycerides 68  -BMP (9/27/2023) creatinine 0.8, GFR > 60, potassium 4.2, sodium 143    Patient Active Problem List   Diagnosis    Peripheral neuropathy    Celiac disease    Hx of  parotitis    Lymphadenopathy of head and neck    Gastroesophageal reflux disease without esophagitis    Cerebrovascular accident (CVA)    Abnormal ECG    Asthma    Colin's esophagus    Joint pain    Seasonal allergies    Sinus headache    Chronic rhinitis    Fibromyalgia    Pain in both lower legs    Paresthesia    Paroxysmal supraventricular tachycardia    Sicca    Toxic polyneuropathy    Vitamin B12 deficiency    Weakness    Dyslipidemia    Dermatomyositis    Essential hypertension       Social History     Tobacco Use    Smoking status: Never     Passive exposure: Never    Smokeless tobacco: Never   Vaping Use    Vaping status: Never Used   Substance Use Topics    Alcohol use: No     Comment: very rare    Drug use: No       Family History   Problem Relation Age of Onset    Diabetes Maternal Grandmother     Diabetes Maternal Grandfather        The following portions of the patient's history were reviewed and updated as appropriate: allergies, current medications, past family history, past medical history, past social history, past surgical history, and problem list.      Current Outpatient Medications:     aspirin 81 MG chewable tablet, aspirin 81 mg chewable tablet  Chew 1 tablet every day by oral route at bedtime., Disp: , Rfl:     atorvastatin (LIPITOR) 20 MG tablet, Take 1 tablet by mouth Daily., Disp: , Rfl:     cholecalciferol 25 MCG (1000 UT) tablet, Take 1 tablet by mouth Daily., Disp: , Rfl:     COLLAGEN PO, Take 1 tablet by mouth Daily., Disp: , Rfl:     COLLAGEN-BORON-HYALURONIC ACID PO, Take 1 tablet by mouth Daily., Disp: , Rfl:     Cyanocobalamin (Vitamin B-12) 50 MCG lozenge, 100mg takes only Tues and Thurs, Disp: , Rfl:     cycloSPORINE (RESTASIS) 0.05 % ophthalmic emulsion, 1 drop 2 (Two) Times a Day., Disp: , Rfl:     dilTIAZem CD (CARDIZEM CD) 120 MG 24 hr capsule, TAKE 1 CAPSULE BY MOUTH EVERY DAY, Disp: 90 capsule, Rfl: 3    hydroxychloroquine (PLAQUENIL) 200 MG tablet, Take 1 tablet by mouth  "Every 12 (Twelve) Hours., Disp: , Rfl:     hydrOXYzine (ATARAX) 50 MG tablet, Take 1 tablet by mouth Every Night., Disp: , Rfl:     levocetirizine (XYZAL) 5 MG tablet, Take 1 tablet by mouth Daily., Disp: , Rfl:     MULTIPLE VITAMIN PO, Take 1 tablet by mouth Daily., Disp: , Rfl:     Pregabalin ER 82.5 MG tablet sustained-release 24 hour, Take 1 tablet by mouth Every Morning., Disp: , Rfl:     pseudoephedrine (SUDAFED) 120 MG 12 hr tablet, Take 1 tablet by mouth Every 12 (Twelve) Hours., Disp: , Rfl:     Voquezna 20 MG tablet, Take 1 tablet by mouth Daily., Disp: , Rfl:     ketoconazole (NIZORAL) 2 % shampoo, , Disp: , Rfl:     Review of Systems   Constitutional: Negative for chills and fever.   HENT:  Positive for congestion.    Cardiovascular:  Positive for dyspnea on exertion. Negative for chest pain and paroxysmal nocturnal dyspnea.   Respiratory:  Positive for shortness of breath. Negative for cough.    Skin:  Negative for rash.   Gastrointestinal:  Negative for abdominal pain and heartburn.   Neurological:  Negative for dizziness and numbness.       Objective   /84 (BP Location: Left arm, Patient Position: Sitting, Cuff Size: Adult)   Pulse 77   Ht 170.2 cm (67.01\")   Wt 85.3 kg (188 lb)   SpO2 98%   BMI 29.44 kg/m²   Constitutional:       Appearance: Well-developed.   HENT:      Head: Normocephalic and atraumatic.   Pulmonary:      Effort: Pulmonary effort is normal.      Breath sounds: Normal breath sounds.   Cardiovascular:      Normal rate. Regular rhythm.   Edema:     Peripheral edema absent.   Skin:     General: Skin is warm and dry.   Neurological:      Mental Status: Alert and oriented to person, place, and time.         ECG 12 Lead    Date/Time: 4/23/2025 11:49 AM  Performed by: Micha Greer MD    Authorized by: Micha Greer MD  Comparison: compared with previous ECG from 11/21/2024  Comparison to previous ECG: RBBB has replaced incomplete RBBB  Rhythm: sinus rhythm  Rate: " normal  Conduction: right bundle branch block            ICD-10-CM ICD-9-CM   1. Paroxysmal supraventricular tachycardia  I47.10 427.0   2. Dyslipidemia  E78.5 272.4   3. Essential hypertension  I10 401.9         Assessment/Plan:  1.  Paroxysmal SVT: Noted on Holter monitor from 10/4/2022.  No significant symptoms at this time.  Continue diltiazem.       2.  Dyslipidemia: Continue atorvastatin and obtain copy of most recent lipid panel from PCP.     3.  Essential hypertension: Blood pressure is mildly elevated today and also running high at home.  This worsened around the time she was started on steroids.  We discussed the options of additional antihypertensive medications, but she prefers additional lifestyle modification.  Continue diltiazem.

## 2025-07-09 RX ORDER — DILTIAZEM HYDROCHLORIDE 120 MG/1
120 CAPSULE, COATED, EXTENDED RELEASE ORAL DAILY
Qty: 90 CAPSULE | Refills: 3 | Status: SHIPPED | OUTPATIENT
Start: 2025-07-09

## (undated) DEVICE — SUT MNCRYL 4/0 P3 18IN UD MCP494G

## (undated) DEVICE — SUT ETHLN 5/0 P3 18IN 698H

## (undated) DEVICE — GLV SURG BIOGEL M LTX PF 7 1/2

## (undated) DEVICE — RETR STAY HK ELAS SHRP 5MM 50PK

## (undated) DEVICE — VAGINAL PREP TRAY: Brand: MEDLINE INDUSTRIES, INC.

## (undated) DEVICE — SUT SILK 3/0 SUTUPAK TIES 24IN SA74H

## (undated) DEVICE — DRSNG BRDR MEPILEXLITE SLFADHR SIL 2X5

## (undated) DEVICE — ADHS LIQ MASTISOL 2/3ML

## (undated) DEVICE — ANTIBACTERIAL UNDYED BRAIDED (POLYGLACTIN 910), SYNTHETIC ABSORBABLE SUTURE: Brand: COATED VICRYL

## (undated) DEVICE — 3M™ STERI-STRIP™ REINFORCED ADHESIVE SKIN CLOSURES, R1546, 1/4 IN X 4 IN (6 MM X 100 MM), 10 STRIPS/ENVELOPE: Brand: 3M™ STERI-STRIP™

## (undated) DEVICE — PK TURNOVER RM ADV

## (undated) DEVICE — PK ENT HD AND NK 30

## (undated) DEVICE — HARMONIC FOCUS SHEARS 9CM LENGTH + ADAPTIVE TISSUE TECHNOLOGY FOR USE WITH BLUE HAND PIECE ONLY: Brand: HARMONIC FOCUS

## (undated) DEVICE — SUT SILK 2/0 SH CR8 18IN CR8 C012D